# Patient Record
Sex: FEMALE | Race: BLACK OR AFRICAN AMERICAN | NOT HISPANIC OR LATINO | Employment: FULL TIME | RURAL
[De-identification: names, ages, dates, MRNs, and addresses within clinical notes are randomized per-mention and may not be internally consistent; named-entity substitution may affect disease eponyms.]

---

## 2020-03-16 LAB
PAP RECOMMENDATION EXT: NORMAL
PAP RECOMMENDATION EXT: NORMAL

## 2021-09-10 ENCOUNTER — LAB VISIT (OUTPATIENT)
Dept: PRIMARY CARE CLINIC | Facility: CLINIC | Age: 41
End: 2021-09-10
Payer: COMMERCIAL

## 2021-09-10 DIAGNOSIS — Z02.83 ENCOUNTER FOR EMPLOYMENT-RELATED DRUG TESTING: ICD-10-CM

## 2021-09-10 PROCEDURE — 99000 PR SPECIMEN HANDLING,DR OFF->LAB: ICD-10-PCS | Mod: ,,, | Performed by: NURSE PRACTITIONER

## 2021-09-10 PROCEDURE — 99000 SPECIMEN HANDLING OFFICE-LAB: CPT | Mod: ,,, | Performed by: NURSE PRACTITIONER

## 2021-09-27 LAB
BCS RECOMMENDATION EXT: NORMAL

## 2022-01-05 ENCOUNTER — OFFICE VISIT (OUTPATIENT)
Dept: FAMILY MEDICINE | Facility: CLINIC | Age: 42
End: 2022-01-05
Payer: COMMERCIAL

## 2022-01-05 VITALS
TEMPERATURE: 98 F | OXYGEN SATURATION: 99 % | WEIGHT: 250 LBS | DIASTOLIC BLOOD PRESSURE: 94 MMHG | BODY MASS INDEX: 40.18 KG/M2 | HEART RATE: 98 BPM | HEIGHT: 66 IN | SYSTOLIC BLOOD PRESSURE: 135 MMHG

## 2022-01-05 DIAGNOSIS — Z11.52 ENCOUNTER FOR SCREENING LABORATORY TESTING FOR COVID-19 VIRUS: ICD-10-CM

## 2022-01-05 DIAGNOSIS — J40 BRONCHITIS: Primary | ICD-10-CM

## 2022-01-05 LAB
CTP QC/QA: YES
SARS-COV-2 AG RESP QL IA.RAPID: NEGATIVE

## 2022-01-05 PROCEDURE — 1159F MED LIST DOCD IN RCRD: CPT | Mod: ,,, | Performed by: FAMILY MEDICINE

## 2022-01-05 PROCEDURE — 1160F RVW MEDS BY RX/DR IN RCRD: CPT | Mod: ,,, | Performed by: FAMILY MEDICINE

## 2022-01-05 PROCEDURE — 3008F BODY MASS INDEX DOCD: CPT | Mod: ,,, | Performed by: FAMILY MEDICINE

## 2022-01-05 PROCEDURE — 87426 SARS CORONAVIRUS 2 ANTIGEN POCT: ICD-10-PCS | Mod: QW,,, | Performed by: FAMILY MEDICINE

## 2022-01-05 PROCEDURE — 87426 SARSCOV CORONAVIRUS AG IA: CPT | Mod: QW,,, | Performed by: FAMILY MEDICINE

## 2022-01-05 PROCEDURE — 99203 PR OFFICE/OUTPT VISIT, NEW, LEVL III, 30-44 MIN: ICD-10-PCS | Mod: ,,, | Performed by: FAMILY MEDICINE

## 2022-01-05 PROCEDURE — 99203 OFFICE O/P NEW LOW 30 MIN: CPT | Mod: ,,, | Performed by: FAMILY MEDICINE

## 2022-01-05 PROCEDURE — 3080F DIAST BP >= 90 MM HG: CPT | Mod: ,,, | Performed by: FAMILY MEDICINE

## 2022-01-05 PROCEDURE — 3080F PR MOST RECENT DIASTOLIC BLOOD PRESSURE >= 90 MM HG: ICD-10-PCS | Mod: ,,, | Performed by: FAMILY MEDICINE

## 2022-01-05 PROCEDURE — 3075F PR MOST RECENT SYSTOLIC BLOOD PRESS GE 130-139MM HG: ICD-10-PCS | Mod: ,,, | Performed by: FAMILY MEDICINE

## 2022-01-05 PROCEDURE — 3008F PR BODY MASS INDEX (BMI) DOCUMENTED: ICD-10-PCS | Mod: ,,, | Performed by: FAMILY MEDICINE

## 2022-01-05 PROCEDURE — 3075F SYST BP GE 130 - 139MM HG: CPT | Mod: ,,, | Performed by: FAMILY MEDICINE

## 2022-01-05 PROCEDURE — 1160F PR REVIEW ALL MEDS BY PRESCRIBER/CLIN PHARMACIST DOCUMENTED: ICD-10-PCS | Mod: ,,, | Performed by: FAMILY MEDICINE

## 2022-01-05 PROCEDURE — 1159F PR MEDICATION LIST DOCUMENTED IN MEDICAL RECORD: ICD-10-PCS | Mod: ,,, | Performed by: FAMILY MEDICINE

## 2022-01-05 RX ORDER — ALBUTEROL SULFATE 90 UG/1
2 AEROSOL, METERED RESPIRATORY (INHALATION) EVERY 6 HOURS PRN
Qty: 18 G | Refills: 0 | Status: SHIPPED | OUTPATIENT
Start: 2022-01-05 | End: 2022-04-08

## 2022-01-05 RX ORDER — AZITHROMYCIN 250 MG/1
TABLET, FILM COATED ORAL
Qty: 6 TABLET | Refills: 0 | Status: SHIPPED | OUTPATIENT
Start: 2022-01-05 | End: 2022-04-08

## 2022-01-05 RX ORDER — BENZONATATE 100 MG/1
100 CAPSULE ORAL 3 TIMES DAILY PRN
Qty: 20 CAPSULE | Refills: 0 | Status: SHIPPED | OUTPATIENT
Start: 2022-01-05 | End: 2022-04-08

## 2022-01-05 RX ORDER — PREDNISONE 20 MG/1
20 TABLET ORAL DAILY
Qty: 5 TABLET | Refills: 0 | Status: SHIPPED | OUTPATIENT
Start: 2022-01-05 | End: 2022-01-10

## 2022-01-05 NOTE — PROGRESS NOTES
Subjective:       Patient ID: Humera Guerrero is a 41 y.o. female.    Chief Complaint: COVID-19 Concerns (Congestion, cough,green mucus sore throat)    HPI  Review of Systems   Constitutional: Negative for activity change, appetite change, chills, diaphoresis, fatigue, fever and unexpected weight change.   HENT: Positive for nasal congestion, postnasal drip, rhinorrhea, sinus pressure/congestion and sore throat. Negative for dental problem, drooling, ear discharge, ear pain, facial swelling, hearing loss, mouth sores, nosebleeds, sneezing, tinnitus, trouble swallowing, voice change and goiter.    Eyes: Negative for photophobia, discharge, itching and visual disturbance.   Respiratory: Positive for cough. Negative for apnea, choking, chest tightness, shortness of breath, wheezing and stridor.    Cardiovascular: Negative for chest pain, palpitations, leg swelling and claudication.   Gastrointestinal: Negative for abdominal distention, abdominal pain, anal bleeding, blood in stool, change in bowel habit, constipation, diarrhea, nausea, vomiting and change in bowel habit.   Endocrine: Negative for cold intolerance, heat intolerance, polydipsia, polyphagia and polyuria.   Genitourinary: Negative for bladder incontinence, decreased urine volume, difficulty urinating, dysuria, enuresis, flank pain, frequency, hematuria, nocturia, pelvic pain and urgency.   Musculoskeletal: Negative for arthralgias, back pain, gait problem, joint swelling, leg pain, myalgias, neck pain, neck stiffness and joint deformity.   Integumentary:  Negative for pallor, rash, wound, breast mass and breast tenderness.   Allergic/Immunologic: Negative for environmental allergies, food allergies and immunocompromised state.   Neurological: Negative for dizziness, vertigo, tremors, seizures, syncope, facial asymmetry, speech difficulty, weakness, light-headedness, numbness, headaches, disturbances in coordination, memory loss and coordination  difficulties.   Hematological: Negative for adenopathy. Does not bruise/bleed easily.   Psychiatric/Behavioral: Negative for agitation, behavioral problems, confusion, decreased concentration, dysphoric mood, hallucinations, self-injury, sleep disturbance and suicidal ideas. The patient is not nervous/anxious and is not hyperactive.    Breast: Negative for mass and tenderness        Objective:      Physical Exam  Vitals reviewed.   Constitutional:       Appearance: Normal appearance.   HENT:      Head: Normocephalic and atraumatic.      Right Ear: Tympanic membrane, ear canal and external ear normal.      Left Ear: Tympanic membrane, ear canal and external ear normal.      Nose: Congestion and rhinorrhea present.      Mouth/Throat:      Mouth: Mucous membranes are moist.      Pharynx: Oropharynx is clear. Posterior oropharyngeal erythema present.   Eyes:      Extraocular Movements: Extraocular movements intact.      Conjunctiva/sclera: Conjunctivae normal.      Pupils: Pupils are equal, round, and reactive to light.   Cardiovascular:      Rate and Rhythm: Normal rate and regular rhythm.      Pulses: Normal pulses.      Heart sounds: Normal heart sounds.   Pulmonary:      Effort: Pulmonary effort is normal.      Breath sounds: Wheezing and rhonchi present.   Abdominal:      General: Bowel sounds are normal.      Palpations: Abdomen is soft.   Musculoskeletal:         General: Normal range of motion.      Cervical back: Normal range of motion and neck supple.   Skin:     General: Skin is warm and dry.   Neurological:      General: No focal deficit present.      Mental Status: She is alert. Mental status is at baseline.   Psychiatric:         Mood and Affect: Mood normal.         Behavior: Behavior normal.         Thought Content: Thought content normal.         Judgment: Judgment normal.         Assessment:       1. Bronchitis    2. Encounter for screening laboratory testing for COVID-19 virus        Plan:      Bronchitis    Encounter for screening laboratory testing for COVID-19 virus  -     SARS Coronavirus 2 Antigen, POCT    Other orders  -     azithromycin (Z-MASSIMO) 250 MG tablet; Take 2 tablets by mouth on day 1; Take 1 tablet by mouth on days 2-5  Dispense: 6 tablet; Refill: 0  -     predniSONE (DELTASONE) 20 MG tablet; Take 1 tablet (20 mg total) by mouth once daily. for 5 days  Dispense: 5 tablet; Refill: 0  -     benzonatate (TESSALON) 100 MG capsule; Take 1 capsule (100 mg total) by mouth 3 (three) times daily as needed for Cough.  Dispense: 20 capsule; Refill: 0  -     albuterol (VENTOLIN HFA) 90 mcg/actuation inhaler; Inhale 2 puffs into the lungs every 6 (six) hours as needed for Wheezing. Rescue  Dispense: 18 g; Refill: 0         Covid negative.

## 2022-01-05 NOTE — LETTER
January 5, 2022      [facility]  [address] [facility] [place] [address]  Phone: [phone]  Fax: [phone]       Patient: Humera Guerrero   YOB: 1980  Date of Visit: 01/05/2022    To Whom It May Concern:    RG Guerrero  was at [facility] on 01/05/2022. The patient may return to work/school on 01/10/2022 with no restrictions. If you have any questions or concerns, or if I can be of further assistance, please do not hesitate to contact me.    Sincerely,    Dr. Brian Hurst II

## 2022-04-08 ENCOUNTER — OFFICE VISIT (OUTPATIENT)
Dept: FAMILY MEDICINE | Facility: CLINIC | Age: 42
End: 2022-04-08
Payer: COMMERCIAL

## 2022-04-08 VITALS
OXYGEN SATURATION: 98 % | SYSTOLIC BLOOD PRESSURE: 122 MMHG | HEIGHT: 66 IN | TEMPERATURE: 98 F | BODY MASS INDEX: 41.46 KG/M2 | DIASTOLIC BLOOD PRESSURE: 88 MMHG | RESPIRATION RATE: 18 BRPM | HEART RATE: 80 BPM | WEIGHT: 258 LBS

## 2022-04-08 DIAGNOSIS — Z00.01 ENCOUNTER FOR GENERAL ADULT MEDICAL EXAMINATION WITH ABNORMAL FINDINGS: Primary | ICD-10-CM

## 2022-04-08 DIAGNOSIS — R53.83 FATIGUE, UNSPECIFIED TYPE: ICD-10-CM

## 2022-04-08 DIAGNOSIS — K59.00 CONSTIPATION, UNSPECIFIED CONSTIPATION TYPE: ICD-10-CM

## 2022-04-08 DIAGNOSIS — Z13.220 SCREENING FOR LIPID DISORDERS: ICD-10-CM

## 2022-04-08 DIAGNOSIS — Z13.29 SCREENING FOR THYROID DISORDER: ICD-10-CM

## 2022-04-08 DIAGNOSIS — Z13.1 SCREENING FOR DIABETES MELLITUS: ICD-10-CM

## 2022-04-08 DIAGNOSIS — R41.89 BRAIN FOG: ICD-10-CM

## 2022-04-08 LAB
25(OH)D3 SERPL-MCNC: 29.8 NG/ML
ALBUMIN SERPL BCP-MCNC: 3.8 G/DL (ref 3.5–5)
ALBUMIN/GLOB SERPL: 0.9 {RATIO}
ALP SERPL-CCNC: 77 U/L (ref 37–98)
ALT SERPL W P-5'-P-CCNC: 27 U/L (ref 13–56)
ANION GAP SERPL CALCULATED.3IONS-SCNC: 10 MMOL/L (ref 7–16)
AST SERPL W P-5'-P-CCNC: 15 U/L (ref 15–37)
BASOPHILS # BLD AUTO: 0.04 K/UL (ref 0–0.2)
BASOPHILS NFR BLD AUTO: 0.5 % (ref 0–1)
BILIRUB SERPL-MCNC: 0.3 MG/DL (ref 0–1.2)
BUN SERPL-MCNC: 14 MG/DL (ref 7–18)
BUN/CREAT SERPL: 13 (ref 6–20)
CALCIUM SERPL-MCNC: 9.9 MG/DL (ref 8.5–10.1)
CHLORIDE SERPL-SCNC: 103 MMOL/L (ref 98–107)
CHOLEST SERPL-MCNC: 221 MG/DL (ref 0–200)
CHOLEST/HDLC SERPL: 3.8 {RATIO}
CO2 SERPL-SCNC: 29 MMOL/L (ref 21–32)
CREAT SERPL-MCNC: 1.12 MG/DL (ref 0.55–1.02)
DIFFERENTIAL METHOD BLD: ABNORMAL
EOSINOPHIL # BLD AUTO: 0.07 K/UL (ref 0–0.5)
EOSINOPHIL NFR BLD AUTO: 0.8 % (ref 1–4)
ERYTHROCYTE [DISTWIDTH] IN BLOOD BY AUTOMATED COUNT: 14.9 % (ref 11.5–14.5)
FOLATE SERPL-MCNC: >20 NG/ML (ref 3.1–17.5)
GLOBULIN SER-MCNC: 4.4 G/DL (ref 2–4)
GLUCOSE SERPL-MCNC: 67 MG/DL (ref 74–106)
HCT VFR BLD AUTO: 41.1 % (ref 38–47)
HCV AB SER QL: NORMAL
HDLC SERPL-MCNC: 58 MG/DL (ref 40–60)
HGB BLD-MCNC: 13 G/DL (ref 12–16)
HIV 1+O+2 AB SERPL QL: NORMAL
IMM GRANULOCYTES # BLD AUTO: 0.02 K/UL (ref 0–0.04)
IMM GRANULOCYTES NFR BLD: 0.2 % (ref 0–0.4)
LDLC SERPL CALC-MCNC: 119 MG/DL
LDLC/HDLC SERPL: 2.1 {RATIO}
LYMPHOCYTES # BLD AUTO: 3.01 K/UL (ref 1–4.8)
LYMPHOCYTES NFR BLD AUTO: 35.8 % (ref 27–41)
MCH RBC QN AUTO: 28 PG (ref 27–31)
MCHC RBC AUTO-ENTMCNC: 31.6 G/DL (ref 32–36)
MCV RBC AUTO: 88.4 FL (ref 80–96)
MONOCYTES # BLD AUTO: 0.37 K/UL (ref 0–0.8)
MONOCYTES NFR BLD AUTO: 4.4 % (ref 2–6)
MPC BLD CALC-MCNC: 11.6 FL (ref 9.4–12.4)
NEUTROPHILS # BLD AUTO: 4.89 K/UL (ref 1.8–7.7)
NEUTROPHILS NFR BLD AUTO: 58.3 % (ref 53–65)
NONHDLC SERPL-MCNC: 163 MG/DL
NRBC # BLD AUTO: 0 X10E3/UL
NRBC, AUTO (.00): 0 %
PLATELET # BLD AUTO: 323 K/UL (ref 150–400)
POTASSIUM SERPL-SCNC: 3.2 MMOL/L (ref 3.5–5.1)
PROT SERPL-MCNC: 8.2 G/DL (ref 6.4–8.2)
RBC # BLD AUTO: 4.65 M/UL (ref 4.2–5.4)
SODIUM SERPL-SCNC: 139 MMOL/L (ref 136–145)
T3FREE SERPL-MCNC: 2.43 PG/ML (ref 2.18–3.98)
T4 FREE SERPL-MCNC: 0.96 NG/DL (ref 0.76–1.46)
TRIGL SERPL-MCNC: 220 MG/DL (ref 35–150)
TSH SERPL DL<=0.005 MIU/L-ACNC: 1.42 UIU/ML (ref 0.36–3.74)
VIT B12 SERPL-MCNC: 424 PG/ML (ref 193–986)
VLDLC SERPL-MCNC: 44 MG/DL
WBC # BLD AUTO: 8.4 K/UL (ref 4.5–11)

## 2022-04-08 PROCEDURE — 80050 GENERAL HEALTH PANEL: CPT | Mod: ICN,,, | Performed by: CLINICAL MEDICAL LABORATORY

## 2022-04-08 PROCEDURE — 3079F DIAST BP 80-89 MM HG: CPT | Mod: ,,, | Performed by: NURSE PRACTITIONER

## 2022-04-08 PROCEDURE — 86803 HEPATITIS C ANTIBODY: ICD-10-PCS | Mod: ICN,,, | Performed by: CLINICAL MEDICAL LABORATORY

## 2022-04-08 PROCEDURE — 1159F MED LIST DOCD IN RCRD: CPT | Mod: ,,, | Performed by: NURSE PRACTITIONER

## 2022-04-08 PROCEDURE — 84481 FREE ASSAY (FT-3): CPT | Mod: ICN,,, | Performed by: CLINICAL MEDICAL LABORATORY

## 2022-04-08 PROCEDURE — 3008F PR BODY MASS INDEX (BMI) DOCUMENTED: ICD-10-PCS | Mod: ,,, | Performed by: NURSE PRACTITIONER

## 2022-04-08 PROCEDURE — 3074F PR MOST RECENT SYSTOLIC BLOOD PRESSURE < 130 MM HG: ICD-10-PCS | Mod: ,,, | Performed by: NURSE PRACTITIONER

## 2022-04-08 PROCEDURE — 84481 T3, FREE: ICD-10-PCS | Mod: ICN,,, | Performed by: CLINICAL MEDICAL LABORATORY

## 2022-04-08 PROCEDURE — 83036 HEMOGLOBIN GLYCOSYLATED A1C: CPT | Mod: ,,, | Performed by: CLINICAL MEDICAL LABORATORY

## 2022-04-08 PROCEDURE — 82607 VITAMIN B-12: CPT | Mod: ICN,,, | Performed by: CLINICAL MEDICAL LABORATORY

## 2022-04-08 PROCEDURE — 3074F SYST BP LT 130 MM HG: CPT | Mod: ,,, | Performed by: NURSE PRACTITIONER

## 2022-04-08 PROCEDURE — 82746 VITAMIN B12/FOLATE, SERUM PANEL: ICD-10-PCS | Mod: ICN,,, | Performed by: CLINICAL MEDICAL LABORATORY

## 2022-04-08 PROCEDURE — 86803 HEPATITIS C AB TEST: CPT | Mod: ICN,,, | Performed by: CLINICAL MEDICAL LABORATORY

## 2022-04-08 PROCEDURE — 84439 T4, FREE: ICD-10-PCS | Mod: ICN,,, | Performed by: CLINICAL MEDICAL LABORATORY

## 2022-04-08 PROCEDURE — 3008F BODY MASS INDEX DOCD: CPT | Mod: ,,, | Performed by: NURSE PRACTITIONER

## 2022-04-08 PROCEDURE — 82306 VITAMIN D: ICD-10-PCS | Mod: ICN,,, | Performed by: CLINICAL MEDICAL LABORATORY

## 2022-04-08 PROCEDURE — 80050 COMPREHENSIVE METABOLIC PANEL: ICD-10-PCS | Mod: ICN,,, | Performed by: CLINICAL MEDICAL LABORATORY

## 2022-04-08 PROCEDURE — 87389 HIV 1 / 2 ANTIBODY: ICD-10-PCS | Mod: ICN,,, | Performed by: CLINICAL MEDICAL LABORATORY

## 2022-04-08 PROCEDURE — 80061 LIPID PANEL: CPT | Mod: ICN,,, | Performed by: CLINICAL MEDICAL LABORATORY

## 2022-04-08 PROCEDURE — 83036 HEMOGLOBIN A1C: ICD-10-PCS | Mod: ,,, | Performed by: CLINICAL MEDICAL LABORATORY

## 2022-04-08 PROCEDURE — 87389 HIV-1 AG W/HIV-1&-2 AB AG IA: CPT | Mod: ICN,,, | Performed by: CLINICAL MEDICAL LABORATORY

## 2022-04-08 PROCEDURE — 82607 VITAMIN B12/FOLATE, SERUM PANEL: ICD-10-PCS | Mod: ICN,,, | Performed by: CLINICAL MEDICAL LABORATORY

## 2022-04-08 PROCEDURE — 99396 PR PREVENTIVE VISIT,EST,40-64: ICD-10-PCS | Mod: ,,, | Performed by: NURSE PRACTITIONER

## 2022-04-08 PROCEDURE — 84439 ASSAY OF FREE THYROXINE: CPT | Mod: ICN,,, | Performed by: CLINICAL MEDICAL LABORATORY

## 2022-04-08 PROCEDURE — 82306 VITAMIN D 25 HYDROXY: CPT | Mod: ICN,,, | Performed by: CLINICAL MEDICAL LABORATORY

## 2022-04-08 PROCEDURE — 99396 PREV VISIT EST AGE 40-64: CPT | Mod: ,,, | Performed by: NURSE PRACTITIONER

## 2022-04-08 PROCEDURE — 80061 LIPID PANEL: ICD-10-PCS | Mod: ICN,,, | Performed by: CLINICAL MEDICAL LABORATORY

## 2022-04-08 PROCEDURE — 1159F PR MEDICATION LIST DOCUMENTED IN MEDICAL RECORD: ICD-10-PCS | Mod: ,,, | Performed by: NURSE PRACTITIONER

## 2022-04-08 PROCEDURE — 82746 ASSAY OF FOLIC ACID SERUM: CPT | Mod: ICN,,, | Performed by: CLINICAL MEDICAL LABORATORY

## 2022-04-08 PROCEDURE — 3079F PR MOST RECENT DIASTOLIC BLOOD PRESSURE 80-89 MM HG: ICD-10-PCS | Mod: ,,, | Performed by: NURSE PRACTITIONER

## 2022-04-08 NOTE — PROGRESS NOTES
SHANNON Callaway   [facility]      PATIENT NAME: Humera Guerrero  : 1980  DATE: 22  MRN: [id number]      Patient PCP Information     Provider PCP Type    [facility]        Chief Complaint      Chief Complaint   Patient presents with    Annual Exam       History of Present Illness        Humera Guerrero is a 41 y.o. female who presents for routine wellness visit. Pt states she is doing well current concern include daytime fatigue, sob and brain fog. Constipation, full in throat area. Dysphagia. Frequent urination at night. Increased anxiety. Pt denies FHx of colon, or cervical cancer. Paternal aunt breast cancer, maternal grandmother cervical cancer. Last colonoscopy was never. Last mammogram was through [facility] in 2022 normal per patient will request records for mammogram and pap. Last pap was 3/16/2020. Had flu shot. Nonsmoker. Working on improving diet and exercise. No hx of covid. Received Pfizer x 2 doses.    Past Medical History:  History reviewed. No pertinent past medical history.    Past Surgical History:   has a past surgical history that includes Hysterectomy (Bilateral) and  section.    Social History:  Social History     Tobacco Use    Smoking status: Former Smoker     Types: Cigars    Smokeless tobacco: Never Used   Substance Use Topics    Alcohol use: Yes     Comment: 1 mixed drink ocassionally    Drug use: Never       I personally reviewed all past medical, surgical, and social.     Review of Systems   Constitutional: Positive for fatigue.   HENT: Negative.    Respiratory: Positive for choking and shortness of breath.    Cardiovascular: Positive for leg swelling.   Gastrointestinal: Positive for constipation.   Endocrine: Positive for heat intolerance and polyuria.        Nocturnal polyuria   Genitourinary: Negative.    Musculoskeletal: Positive for arthralgias.   Skin: Negative.    Allergic/Immunologic: Negative.   "  Neurological: Negative.         Brain fog     Hematological: Negative.    Psychiatric/Behavioral: The patient is nervous/anxious.         Medications:  OTC estrogen supplement    Allergies:  Review of patient's allergies indicates:  No Known Allergies    Health Maintenance:    There is no immunization history on file for this patient.   Health Maintenance   Topic Date Due    Hepatitis C Screening  Never done    Lipid Panel  Never done    TETANUS VACCINE  Never done    Mammogram  Never done        Physical Exam      Vital Signs  Temp: 98.4 °F (36.9 °C)  Pulse: 80  Resp: 18  SpO2: 98 %  BP: 122/88  BP Location: Right arm  Patient Position: Sitting  Height and Weight  Height: 5' 6" (167.6 cm)  Weight: 117 kg (258 lb)  BSA (Calculated - sq m): 2.33 sq meters  BMI (Calculated): 41.7  Weight in (lb) to have BMI = 25: 154.6]                                            Visual Acuity Screening    Right eye Left eye Both eyes   Without correction: 20/50 20/40 20/40   With correction:      Comments: Pt states she wears glasses, does not have glasses in office today.       Physical Exam  Vitals and nursing note reviewed.   Constitutional:       Appearance: Normal appearance. She is obese.   HENT:      Head: Normocephalic.      Right Ear: Tympanic membrane, ear canal and external ear normal.      Left Ear: Tympanic membrane, ear canal and external ear normal.      Nose: Nose normal.      Mouth/Throat:      Mouth: Mucous membranes are moist.   Eyes:      Extraocular Movements: Extraocular movements intact.      Conjunctiva/sclera: Conjunctivae normal.      Pupils: Pupils are equal, round, and reactive to light.      Comments: exopthalmus     Neck:      Thyroid: Thyromegaly and thyroid tenderness present.      Vascular: No JVD.      Comments: Right side of thyroid enlarged compared to left, tender to palpation   Cardiovascular:      Rate and Rhythm: Normal rate and regular rhythm.      Pulses: Normal pulses.      Heart " sounds: Normal heart sounds. No murmur heard.  Pulmonary:      Effort: Pulmonary effort is normal.      Breath sounds: Normal breath sounds. No stridor. No wheezing or rhonchi.   Abdominal:      General: There is no distension.      Palpations: Abdomen is soft. There is no mass.      Tenderness: There is no abdominal tenderness.      Comments: Hypoactive bs   Musculoskeletal:         General: No swelling or tenderness. Normal range of motion.      Cervical back: Full passive range of motion without pain, normal range of motion and neck supple.      Right lower leg: No edema.      Left lower leg: No edema.   Skin:     General: Skin is warm and dry.      Capillary Refill: Capillary refill takes less than 2 seconds.   Neurological:      General: No focal deficit present.      Mental Status: She is alert and oriented to person, place, and time. Mental status is at baseline.      Cranial Nerves: No cranial nerve deficit.      Sensory: No sensory deficit.      Motor: No weakness.   Psychiatric:         Mood and Affect: Mood normal.         Behavior: Behavior normal.         Thought Content: Thought content normal.         Judgment: Judgment normal.          Depression Screening  PHQ-9:    Over the last 2 weeks, how often have you been bothered by any of the following problems?    1.  Little interest or pleasure in doing things: Not at all                       = 0   2.  Feeling down, depressed or hopeless: Not at all                       = 0   3.  Trouble falling or staying asleep, or sleeping too much: Not at all                       = 0   4.  Feeling tired or having little energy: Nearly every day           = 3   5.  Poor appetite or overeating: Several days                = 1   6.  Feeling bad about yourself - or that you are a failure or have let yourself or your family down: Not at all                       = 0   7.  Trouble concentrating on things, such as reading the newspaper or watching television: Not at all                        = 0   8.  Moving or speaking so slowly that other people could have noticed.  Or the opposite - being fidgety or restless that you have been moving around a lot more than usual: Not at all                       = 0   9.  Thoughts that you would be better off dead, or of hurting yourself: Not at all                       = 0    PHQ-9 Total:  4     Total Score  0-4: None  5-9: Mild  10-14: Moderate  15-19: Moderately Severe  20-27: Severe    Laboratory: Fasting labs pending   CBC:      CMP:      LIPIDS:      TSH:      A1C:        Assessment/Plan     Humera Guerrero is a 41 y.o.female with:     1. Encounter for general adult medical examination with abnormal findings  - Comprehensive Metabolic Panel; Future  - CBC Auto Differential; Future  - HIV 1/2 Ag/Ab (4th Gen); Future  - Hepatitis C Antibody; Future  - Mammogram and pap UTD  -Imm: UTD    2. Fatigue, constipation and brain fog  - TSH; Future  - Vitamin D; Future  - Vitamin B12 & Folate; Future  - T3, Free; Future  - T4, Free; Future  -increase fiber in diet     3. Screening for lipid disorders  - Lipid Panel; Future      4. Screening for diabetes mellitus  - Hemoglobin A1C; Future      5. Screening for thyroid disorder  - TSH; Future  - T3, Free; Future  - T4, Free; Future      8. BMI 40.0-44.9, adult  -discussed exercise and weight loss  -consider sleep study if workup for fatigue negative        Total time spent face-to-face and non-face-to-face coordinating care for this encounter was: >30 min    Chronic conditions status updated as per HPI.  Other than changes above, cont current medications and maintain follow up with specialists.  Return to clinic 1 year next wellness, 1 mo for fatigue follow up.    [facility]

## 2022-04-09 LAB
EST. AVERAGE GLUCOSE BLD GHB EST-MCNC: 107 MG/DL
HBA1C MFR BLD HPLC: 5.8 % (ref 4.5–6.6)

## 2022-04-14 ENCOUNTER — TELEPHONE (OUTPATIENT)
Dept: FAMILY MEDICINE | Facility: CLINIC | Age: 42
End: 2022-04-14
Payer: COMMERCIAL

## 2022-04-14 RX ORDER — LOVASTATIN 10 MG/1
10 TABLET ORAL NIGHTLY
Qty: 90 TABLET | Refills: 3 | Status: SHIPPED | OUTPATIENT
Start: 2022-04-14 | End: 2022-09-01 | Stop reason: SINTOL

## 2022-04-18 ENCOUNTER — TELEPHONE (OUTPATIENT)
Dept: FAMILY MEDICINE | Facility: CLINIC | Age: 42
End: 2022-04-18
Payer: COMMERCIAL

## 2022-04-18 DIAGNOSIS — E01.0 THYROMEGALY: Primary | ICD-10-CM

## 2022-04-25 ENCOUNTER — HOSPITAL ENCOUNTER (OUTPATIENT)
Dept: RADIOLOGY | Facility: HOSPITAL | Age: 42
Discharge: HOME | End: 2022-04-25
Attending: NURSE PRACTITIONER
Payer: COMMERCIAL

## 2022-04-25 DIAGNOSIS — E01.0 THYROMEGALY: ICD-10-CM

## 2022-04-25 PROCEDURE — 76536 US SOFT TISSUE HEAD NECK THYROID: ICD-10-PCS | Mod: 26,,,

## 2022-04-25 PROCEDURE — 76536 US EXAM OF HEAD AND NECK: CPT | Mod: TC

## 2022-04-25 PROCEDURE — 76536 US EXAM OF HEAD AND NECK: CPT | Mod: 26,,,

## 2022-05-05 ENCOUNTER — PATIENT OUTREACH (OUTPATIENT)
Dept: PEDIATRICS | Facility: CLINIC | Age: 42
End: 2022-05-05
Payer: COMMERCIAL

## 2022-05-20 ENCOUNTER — OFFICE VISIT (OUTPATIENT)
Dept: FAMILY MEDICINE | Facility: CLINIC | Age: 42
End: 2022-05-20
Payer: COMMERCIAL

## 2022-05-20 VITALS
HEIGHT: 66 IN | BODY MASS INDEX: 39.51 KG/M2 | RESPIRATION RATE: 17 BRPM | DIASTOLIC BLOOD PRESSURE: 79 MMHG | TEMPERATURE: 99 F | SYSTOLIC BLOOD PRESSURE: 110 MMHG | WEIGHT: 245.81 LBS | HEART RATE: 72 BPM | OXYGEN SATURATION: 98 %

## 2022-05-20 DIAGNOSIS — R73.03 PREDIABETES: ICD-10-CM

## 2022-05-20 DIAGNOSIS — E55.9 VITAMIN D DEFICIENCY: ICD-10-CM

## 2022-05-20 DIAGNOSIS — E78.5 HYPERLIPIDEMIA, UNSPECIFIED HYPERLIPIDEMIA TYPE: Primary | ICD-10-CM

## 2022-05-20 PROCEDURE — 3044F HG A1C LEVEL LT 7.0%: CPT | Mod: ,,, | Performed by: NURSE PRACTITIONER

## 2022-05-20 PROCEDURE — 1160F RVW MEDS BY RX/DR IN RCRD: CPT | Mod: ,,, | Performed by: NURSE PRACTITIONER

## 2022-05-20 PROCEDURE — 3078F PR MOST RECENT DIASTOLIC BLOOD PRESSURE < 80 MM HG: ICD-10-PCS | Mod: ,,, | Performed by: NURSE PRACTITIONER

## 2022-05-20 PROCEDURE — 1160F PR REVIEW ALL MEDS BY PRESCRIBER/CLIN PHARMACIST DOCUMENTED: ICD-10-PCS | Mod: ,,, | Performed by: NURSE PRACTITIONER

## 2022-05-20 PROCEDURE — 3074F SYST BP LT 130 MM HG: CPT | Mod: ,,, | Performed by: NURSE PRACTITIONER

## 2022-05-20 PROCEDURE — 3008F BODY MASS INDEX DOCD: CPT | Mod: ,,, | Performed by: NURSE PRACTITIONER

## 2022-05-20 PROCEDURE — 3008F PR BODY MASS INDEX (BMI) DOCUMENTED: ICD-10-PCS | Mod: ,,, | Performed by: NURSE PRACTITIONER

## 2022-05-20 PROCEDURE — 3044F PR MOST RECENT HEMOGLOBIN A1C LEVEL <7.0%: ICD-10-PCS | Mod: ,,, | Performed by: NURSE PRACTITIONER

## 2022-05-20 PROCEDURE — 3078F DIAST BP <80 MM HG: CPT | Mod: ,,, | Performed by: NURSE PRACTITIONER

## 2022-05-20 PROCEDURE — 1159F PR MEDICATION LIST DOCUMENTED IN MEDICAL RECORD: ICD-10-PCS | Mod: ,,, | Performed by: NURSE PRACTITIONER

## 2022-05-20 PROCEDURE — 3074F PR MOST RECENT SYSTOLIC BLOOD PRESSURE < 130 MM HG: ICD-10-PCS | Mod: ,,, | Performed by: NURSE PRACTITIONER

## 2022-05-20 PROCEDURE — 99213 PR OFFICE/OUTPT VISIT, EST, LEVL III, 20-29 MIN: ICD-10-PCS | Mod: ,,, | Performed by: NURSE PRACTITIONER

## 2022-05-20 PROCEDURE — 99213 OFFICE O/P EST LOW 20 MIN: CPT | Mod: ,,, | Performed by: NURSE PRACTITIONER

## 2022-05-20 PROCEDURE — 1159F MED LIST DOCD IN RCRD: CPT | Mod: ,,, | Performed by: NURSE PRACTITIONER

## 2022-05-20 NOTE — PROGRESS NOTES
SHANNON Callaway   [facility]      PATIENT NAME: Humera Guerrero  : 1980  DATE: 22  MRN: [id number]      Patient PCP Information     Provider PCP Type    [facility]          Reason for Visit / Chief Complaint: Follow-up (Pt states she had a annual wellness check last month. /Room: 8)         History of Present Illness / Problem Focused Workflow     Humera Guerrero presents to the clinic with Follow-up (Pt states she had a annual wellness check last month. /Room: 8)     HPI   Ms Guerrero is here for follow up on fatigue, sob and brain fog. On prior wellness visit patient was noted to have hyperlipidemia, prediabetes and Vitamin D deficiency.   Patient was started on statin for hyperlipidemia. Thyroid studies and thyroid us were check and all unremarkable.   Patient implemented diet and exercise changes for prediabetes. Patient down 13 lbs since last visit. Reports feeling better symptoms improved.   Patient also taking vit D and calcium supplement.   On last CMP creatinine 1.12, K 3.2 (unknown baseline)  Will repeat in 3 mo, increase oral hydration prev recommended    Review of Systems     Review of Systems   Constitutional: Negative for activity change, appetite change, chills, diaphoresis, fatigue, fever and unexpected weight change.   HENT: Negative for congestion, ear pain, facial swelling, hearing loss, nosebleeds and sore throat.    Respiratory: Negative for apnea, cough, shortness of breath and wheezing.    Cardiovascular: Negative for chest pain, palpitations and leg swelling.   Gastrointestinal: Negative for abdominal distention, abdominal pain, blood in stool, constipation, diarrhea and nausea.   Endocrine: Negative for cold intolerance, heat intolerance, polydipsia, polyphagia and polyuria.   Genitourinary: Negative for decreased urine volume, difficulty urinating, dysuria, flank pain, frequency, hematuria and urgency.   Musculoskeletal: Negative for  "arthralgias, joint swelling and myalgias.   Skin: Negative for color change and rash.   Neurological: Negative for dizziness, tremors, seizures, syncope, facial asymmetry, speech difficulty, weakness, light-headedness, numbness and headaches.   Hematological: Negative for adenopathy. Does not bruise/bleed easily.   Psychiatric/Behavioral: Negative for behavioral problems and confusion.       Medical / Social / Family History   History reviewed. No pertinent past medical history.    Past Surgical History:   Procedure Laterality Date     SECTION      HYSTERECTOMY Bilateral     TUBAL LIGATION         Social History  Ms.  reports that she has quit smoking. Her smoking use included cigars. She has never used smokeless tobacco. She reports current alcohol use. She reports that she does not use drugs.    Family History  Ms.'s family history includes Cancer in her paternal grandmother; Hypertension in her maternal grandmother and mother; Other in her mother.    Medications and Allergies     Medications  Outpatient Medications Marked as Taking for the 22 encounter (Office Visit) with SHANNON Callaway   Medication Sig Dispense Refill    lovastatin (MEVACOR) 10 MG tablet Take 1 tablet (10 mg total) by mouth every evening. 90 tablet 3       Allergies  Review of patient's allergies indicates:  No Known Allergies    Physical Examination     Vitals:    22 0817   BP: 110/79   BP Location: Right arm   Patient Position: Sitting   BP Method: Medium (Automatic)   Pulse: 72   Resp: 17   Temp: 98.5 °F (36.9 °C)   SpO2: 98%   Weight: 111.5 kg (245 lb 12.8 oz)   Height: 5' 6" (1.676 m)       Physical Exam  Vitals and nursing note reviewed.   Constitutional:       Appearance: Normal appearance. She is obese.   HENT:      Head: Normocephalic.      Right Ear: External ear normal.      Left Ear: External ear normal.      Nose: Nose normal.      Mouth/Throat:      Mouth: Mucous membranes are moist.   Eyes:      " Extraocular Movements: Extraocular movements intact.   Cardiovascular:      Rate and Rhythm: Normal rate.      Pulses: Normal pulses.      Heart sounds: No murmur heard.  Pulmonary:      Effort: Pulmonary effort is normal.      Breath sounds: No stridor. No wheezing or rhonchi.   Musculoskeletal:         General: No swelling or tenderness. Normal range of motion.      Cervical back: Normal range of motion and neck supple.      Right lower leg: No edema.      Left lower leg: No edema.   Skin:     General: Skin is warm and dry.      Capillary Refill: Capillary refill takes less than 2 seconds.   Neurological:      General: No focal deficit present.      Mental Status: She is alert. Mental status is at baseline.      Cranial Nerves: No cranial nerve deficit.      Sensory: No sensory deficit.      Motor: No weakness.   Psychiatric:         Mood and Affect: Mood normal.         Behavior: Behavior normal.         Thought Content: Thought content normal.         Judgment: Judgment normal.           No visits with results within 14 Day(s) from this visit.   Latest known visit with results is:   Patient Outreach on 05/05/2022   Component Date Value Ref Range Status    PAP Recommendation External 03/16/2020 Pap in 2 years   Final             Assessment and Plan (including Health Maintenance)         Plan:   Hyperlipidemia, unspecified hyperlipidemia type    Prediabetes    Vitamin D deficiency    BMI 39.0-39.9,adult     Continue diet and exercise good progress  BMI down   Continue statin and Ca//Vit D supplememnt  RTC in 3 mo for follow up, repeat BMP  >20 min spent on intake, hx, discussion of plan  There are no Patient Instructions on file for this visit.       Health Maintenance Due   Topic Date Due    COVID-19 Vaccine (1) Never done    TETANUS VACCINE  Never done         There is no immunization history on file for this patient.     Problem List Items Addressed This Visit        Cardiac/Vascular    Hyperlipidemia -  Primary       Endocrine    BMI 39.0-39.9,adult    Prediabetes    Vitamin D deficiency          Health Maintenance Topics with due status: Not Due       Topic Last Completion Date    Mammogram 09/27/2021    Influenza Vaccine Not Due       Future Appointments   Date Time Provider Department Center   8/26/2022  8:00 AM [facility]   4/10/2023 10:00 AM SHANNON Callaway [facility]            Signature:  SHANNON Callaway  [facility]     Date of encounter: 5/20/22

## 2022-07-18 ENCOUNTER — OFFICE VISIT (OUTPATIENT)
Dept: FAMILY MEDICINE | Facility: CLINIC | Age: 42
End: 2022-07-18
Payer: COMMERCIAL

## 2022-07-18 VITALS
HEIGHT: 66 IN | TEMPERATURE: 98 F | SYSTOLIC BLOOD PRESSURE: 116 MMHG | BODY MASS INDEX: 38.89 KG/M2 | WEIGHT: 242 LBS | RESPIRATION RATE: 18 BRPM | DIASTOLIC BLOOD PRESSURE: 80 MMHG | HEART RATE: 76 BPM | OXYGEN SATURATION: 97 %

## 2022-07-18 DIAGNOSIS — R73.03 PREDIABETES: Primary | ICD-10-CM

## 2022-07-18 DIAGNOSIS — E55.9 VITAMIN D INSUFFICIENCY: ICD-10-CM

## 2022-07-18 DIAGNOSIS — E78.5 HYPERLIPIDEMIA, UNSPECIFIED HYPERLIPIDEMIA TYPE: ICD-10-CM

## 2022-07-18 PROBLEM — G93.0 CEREBRAL CYST: Status: ACTIVE | Noted: 2022-07-18

## 2022-07-18 LAB
ALBUMIN SERPL BCP-MCNC: 3.8 G/DL (ref 3.5–5)
ALBUMIN/GLOB SERPL: 0.9 {RATIO}
ALP SERPL-CCNC: 71 U/L (ref 37–98)
ALT SERPL W P-5'-P-CCNC: 23 U/L (ref 13–56)
ANION GAP SERPL CALCULATED.3IONS-SCNC: 7 MMOL/L (ref 7–16)
AST SERPL W P-5'-P-CCNC: 14 U/L (ref 15–37)
BILIRUB SERPL-MCNC: 0.5 MG/DL (ref 0–1.2)
BUN SERPL-MCNC: 12 MG/DL (ref 7–18)
BUN/CREAT SERPL: 10 (ref 6–20)
CALCIUM SERPL-MCNC: 9.8 MG/DL (ref 8.5–10.1)
CHLORIDE SERPL-SCNC: 105 MMOL/L (ref 98–107)
CHOLEST SERPL-MCNC: 169 MG/DL (ref 0–200)
CHOLEST/HDLC SERPL: 3.1 {RATIO}
CO2 SERPL-SCNC: 33 MMOL/L (ref 21–32)
CREAT SERPL-MCNC: 1.21 MG/DL (ref 0.55–1.02)
EST. AVERAGE GLUCOSE BLD GHB EST-MCNC: 100 MG/DL
GLOBULIN SER-MCNC: 4.1 G/DL (ref 2–4)
GLUCOSE SERPL-MCNC: 95 MG/DL (ref 74–106)
HBA1C MFR BLD HPLC: 5.6 % (ref 4.5–6.6)
HDLC SERPL-MCNC: 55 MG/DL (ref 40–60)
LDLC SERPL CALC-MCNC: 65 MG/DL
LDLC/HDLC SERPL: 1.2 {RATIO}
NONHDLC SERPL-MCNC: 114 MG/DL
POTASSIUM SERPL-SCNC: 4.3 MMOL/L (ref 3.5–5.1)
PROT SERPL-MCNC: 7.9 G/DL (ref 6.4–8.2)
SODIUM SERPL-SCNC: 141 MMOL/L (ref 136–145)
TRIGL SERPL-MCNC: 243 MG/DL (ref 35–150)
VLDLC SERPL-MCNC: 49 MG/DL

## 2022-07-18 PROCEDURE — 80053 COMPREHENSIVE METABOLIC PANEL: ICD-10-PCS | Mod: ,,, | Performed by: CLINICAL MEDICAL LABORATORY

## 2022-07-18 PROCEDURE — 3079F DIAST BP 80-89 MM HG: CPT | Mod: ,,, | Performed by: NURSE PRACTITIONER

## 2022-07-18 PROCEDURE — 83036 HEMOGLOBIN GLYCOSYLATED A1C: CPT | Mod: ,,, | Performed by: CLINICAL MEDICAL LABORATORY

## 2022-07-18 PROCEDURE — 3008F BODY MASS INDEX DOCD: CPT | Mod: ,,, | Performed by: NURSE PRACTITIONER

## 2022-07-18 PROCEDURE — 3044F HG A1C LEVEL LT 7.0%: CPT | Mod: ,,, | Performed by: NURSE PRACTITIONER

## 2022-07-18 PROCEDURE — 83036 HEMOGLOBIN A1C: ICD-10-PCS | Mod: ,,, | Performed by: CLINICAL MEDICAL LABORATORY

## 2022-07-18 PROCEDURE — 99213 PR OFFICE/OUTPT VISIT, EST, LEVL III, 20-29 MIN: ICD-10-PCS | Mod: ,,, | Performed by: NURSE PRACTITIONER

## 2022-07-18 PROCEDURE — 80061 LIPID PANEL: CPT | Mod: ,,, | Performed by: CLINICAL MEDICAL LABORATORY

## 2022-07-18 PROCEDURE — 3074F PR MOST RECENT SYSTOLIC BLOOD PRESSURE < 130 MM HG: ICD-10-PCS | Mod: ,,, | Performed by: NURSE PRACTITIONER

## 2022-07-18 PROCEDURE — 3074F SYST BP LT 130 MM HG: CPT | Mod: ,,, | Performed by: NURSE PRACTITIONER

## 2022-07-18 PROCEDURE — 3079F PR MOST RECENT DIASTOLIC BLOOD PRESSURE 80-89 MM HG: ICD-10-PCS | Mod: ,,, | Performed by: NURSE PRACTITIONER

## 2022-07-18 PROCEDURE — 99213 OFFICE O/P EST LOW 20 MIN: CPT | Mod: ,,, | Performed by: NURSE PRACTITIONER

## 2022-07-18 PROCEDURE — 80053 COMPREHEN METABOLIC PANEL: CPT | Mod: ,,, | Performed by: CLINICAL MEDICAL LABORATORY

## 2022-07-18 PROCEDURE — 3008F PR BODY MASS INDEX (BMI) DOCUMENTED: ICD-10-PCS | Mod: ,,, | Performed by: NURSE PRACTITIONER

## 2022-07-18 PROCEDURE — 3044F PR MOST RECENT HEMOGLOBIN A1C LEVEL <7.0%: ICD-10-PCS | Mod: ,,, | Performed by: NURSE PRACTITIONER

## 2022-07-18 PROCEDURE — 1159F PR MEDICATION LIST DOCUMENTED IN MEDICAL RECORD: ICD-10-PCS | Mod: ,,, | Performed by: NURSE PRACTITIONER

## 2022-07-18 PROCEDURE — 80061 LIPID PANEL: ICD-10-PCS | Mod: ,,, | Performed by: CLINICAL MEDICAL LABORATORY

## 2022-07-18 PROCEDURE — 1159F MED LIST DOCD IN RCRD: CPT | Mod: ,,, | Performed by: NURSE PRACTITIONER

## 2022-07-18 NOTE — LETTER
July 18, 2022      [address] [address]  Phone: [phone]  Fax: [phone]       Patient: Humera Guerrero   YOB: 1980  Date of Visit: 07/18/2022    To Whom It May Concern:    RG Guerrero  was at [facility] on 07/18/2022. The patient may return to work/school on 7/18/2022 with no restrictions. If you have any questions or concerns, or if I can be of further assistance, please do not hesitate to contact me.    Sincerely,    SHANNON Callaway

## 2022-07-18 NOTE — PROGRESS NOTES
"SHANNON Callaway   [facility]      PATIENT NAME: Humera Guerrero  : 1980  DATE: 22  MRN: [id number]      Patient PCP Information     Provider PCP Type    SHANNON [facility]          Reason for Visit / Chief Complaint: Follow-up (Medication follow up /Room 8)         History of Present Illness / Problem Focused Workflow     Humera Guerrero presents to the clinic with Follow-up (Medication follow up /Room 8)     HPI   Ms Guerrero is here today for follow up on prediabetes and hyperlipidemia.   She is down another 2 lbs, continuing to work on diet and weight loss  Ms Guerrero does report some increased fatigue since last appt however overall doing well  Takes statin nightly as ordered   Ms Guerrero does report hx of "brain cyst" followed by Neurology here at [facility] in past. States she did have imaging for monitoring however has not been seen in recent years. Will obtain records from [facility]. Symptom at diagnosis blurred vision per patient. Wears glasses daily. No worsening vision reported.     Review of Systems     Review of Systems   Constitutional: Positive for fatigue. Negative for activity change, appetite change, chills, diaphoresis, fever and unexpected weight change.   HENT: Negative for congestion, ear pain, facial swelling, hearing loss, nosebleeds and sore throat.    Respiratory: Negative for apnea, cough, shortness of breath and wheezing.    Cardiovascular: Negative for chest pain, palpitations and leg swelling.   Gastrointestinal: Negative for abdominal distention, abdominal pain, blood in stool, constipation, diarrhea and nausea.   Endocrine: Negative for cold intolerance, heat intolerance, polydipsia, polyphagia and polyuria.   Genitourinary: Negative for decreased urine volume, difficulty urinating, dysuria, flank pain, frequency, hematuria and urgency.   Musculoskeletal: Negative for arthralgias, joint swelling and myalgias.   Skin: Negative for color change and rash. " "  Neurological: Negative for dizziness, tremors, seizures, syncope, facial asymmetry, speech difficulty, weakness, light-headedness, numbness and headaches.   Hematological: Negative for adenopathy. Does not bruise/bleed easily.   Psychiatric/Behavioral: Negative for behavioral problems and confusion.       Medical / Social / Family History     Past Medical History:   Diagnosis Date    Other hyperlipidemia     Prediabetes     Vitamin D insufficiency        Past Surgical History:   Procedure Laterality Date     SECTION      HYSTERECTOMY Bilateral     TUBAL LIGATION         Social History  Ms.  reports that she has quit smoking. Her smoking use included cigars. She has never used smokeless tobacco. She reports current alcohol use. She reports that she does not use drugs.    Family History  Ms.'s family history includes Cancer in her paternal grandmother; Hypertension in her maternal grandmother and mother; Other in her mother.    Medications and Allergies     Medications  Outpatient Medications Marked as Taking for the 22 encounter (Office Visit) with SHANNON Callaway   Medication Sig Dispense Refill    lovastatin (MEVACOR) 10 MG tablet Take 1 tablet (10 mg total) by mouth every evening. 90 tablet 3       Allergies  Review of patient's allergies indicates:  No Known Allergies    Physical Examination     Vitals:    22 0823   BP: 116/80   Pulse: 76   Resp: 18   Temp: 98.2 °F (36.8 °C)   SpO2: 97%   Weight: 109.8 kg (242 lb)   Height: 5' 6" (1.676 m)       Physical Exam  Vitals and nursing note reviewed.   Constitutional:       Appearance: Normal appearance. She is normal weight.   HENT:      Head: Normocephalic.      Right Ear: External ear normal.      Left Ear: External ear normal.      Nose: Nose normal.      Mouth/Throat:      Mouth: Mucous membranes are moist.   Eyes:      Extraocular Movements: Extraocular movements intact.      Conjunctiva/sclera: Conjunctivae normal.      Pupils: " Pupils are equal, round, and reactive to light.   Cardiovascular:      Rate and Rhythm: Normal rate and regular rhythm.      Pulses: Normal pulses.      Heart sounds: Normal heart sounds. No murmur heard.  Pulmonary:      Effort: Pulmonary effort is normal.      Breath sounds: Normal breath sounds. No stridor. No wheezing or rhonchi.   Abdominal:      General: Bowel sounds are normal. There is no distension.      Palpations: Abdomen is soft. There is no mass.      Tenderness: There is no abdominal tenderness.   Musculoskeletal:         General: No swelling or tenderness. Normal range of motion.      Cervical back: Normal range of motion and neck supple.      Right lower leg: No edema.      Left lower leg: No edema.   Skin:     General: Skin is warm and dry.      Capillary Refill: Capillary refill takes less than 2 seconds.   Neurological:      General: No focal deficit present.      Mental Status: She is alert. Mental status is at baseline.      Cranial Nerves: No cranial nerve deficit.      Sensory: No sensory deficit.      Motor: No weakness.   Psychiatric:         Mood and Affect: Mood normal.         Behavior: Behavior normal.         Thought Content: Thought content normal.         Judgment: Judgment normal.           No visits with results within 14 Day(s) from this visit.   Latest known visit with results is:   Patient Outreach on 05/05/2022   Component Date Value Ref Range Status    PAP Recommendation External 03/16/2020 Pap in 2 years   Final             Assessment and Plan (including Health Maintenance)       Plan:   Prediabetes  -     Hemoglobin A1C; Future; Expected date: 07/18/2022  -     Comprehensive Metabolic Panel; Future; Expected date: 07/18/2022  -    Continue ada diet and exercise     Hyperlipidemia, unspecified hyperlipidemia type  -     Lipid Panel; Future; Expected date: 07/18/2022  -     Continue statin    Vitamin D insufficiency       -    Continue otc vit D and calcium supplement      Follow up in 3 months  There are no Patient Instructions on file for this visit.       Health Maintenance Due   Topic Date Due    COVID-19 Vaccine (1) Never done    TETANUS VACCINE  Never done         There is no immunization history on file for this patient.     Problem List Items Addressed This Visit        Cardiac/Vascular    Hyperlipidemia    Relevant Orders    Lipid Panel       Endocrine    Prediabetes - Primary    Relevant Orders    Hemoglobin A1C    Comprehensive Metabolic Panel      Other Visit Diagnoses     Vitamin D insufficiency              Health Maintenance Topics with due status: Not Due       Topic Last Completion Date    Mammogram 09/27/2021    Influenza Vaccine Not Due       Future Appointments   Date Time Provider Department Center   8/26/2022  8:00 AM Sultana P Neil, FNP [facility]   10/18/2022  8:45 AM Sultana P Neil, FNP [facility]   4/10/2023 10:00 AM Sultana P Neil, FNP [facility]            Signature:  [facility]     Date of encounter: 7/18/22

## 2022-08-17 ENCOUNTER — OFFICE VISIT (OUTPATIENT)
Dept: FAMILY MEDICINE | Facility: CLINIC | Age: 42
End: 2022-08-17
Payer: COMMERCIAL

## 2022-08-17 VITALS
TEMPERATURE: 99 F | WEIGHT: 242 LBS | DIASTOLIC BLOOD PRESSURE: 86 MMHG | HEIGHT: 66 IN | OXYGEN SATURATION: 99 % | RESPIRATION RATE: 18 BRPM | SYSTOLIC BLOOD PRESSURE: 114 MMHG | BODY MASS INDEX: 38.89 KG/M2 | HEART RATE: 71 BPM

## 2022-08-17 DIAGNOSIS — Z11.52 ENCOUNTER FOR SCREENING LABORATORY TESTING FOR COVID-19 VIRUS: Primary | ICD-10-CM

## 2022-08-17 LAB
CTP QC/QA: YES
SARS-COV-2 AG RESP QL IA.RAPID: NEGATIVE

## 2022-08-17 PROCEDURE — 3074F PR MOST RECENT SYSTOLIC BLOOD PRESSURE < 130 MM HG: ICD-10-PCS | Mod: ,,, | Performed by: FAMILY MEDICINE

## 2022-08-17 PROCEDURE — 99214 PR OFFICE/OUTPT VISIT, EST, LEVL IV, 30-39 MIN: ICD-10-PCS | Mod: ,,, | Performed by: FAMILY MEDICINE

## 2022-08-17 PROCEDURE — 3044F PR MOST RECENT HEMOGLOBIN A1C LEVEL <7.0%: ICD-10-PCS | Mod: ,,, | Performed by: FAMILY MEDICINE

## 2022-08-17 PROCEDURE — 1160F PR REVIEW ALL MEDS BY PRESCRIBER/CLIN PHARMACIST DOCUMENTED: ICD-10-PCS | Mod: ,,, | Performed by: FAMILY MEDICINE

## 2022-08-17 PROCEDURE — 3044F HG A1C LEVEL LT 7.0%: CPT | Mod: ,,, | Performed by: FAMILY MEDICINE

## 2022-08-17 PROCEDURE — 3008F BODY MASS INDEX DOCD: CPT | Mod: ,,, | Performed by: FAMILY MEDICINE

## 2022-08-17 PROCEDURE — 1160F RVW MEDS BY RX/DR IN RCRD: CPT | Mod: ,,, | Performed by: FAMILY MEDICINE

## 2022-08-17 PROCEDURE — 3079F DIAST BP 80-89 MM HG: CPT | Mod: ,,, | Performed by: FAMILY MEDICINE

## 2022-08-17 PROCEDURE — 3074F SYST BP LT 130 MM HG: CPT | Mod: ,,, | Performed by: FAMILY MEDICINE

## 2022-08-17 PROCEDURE — 1159F PR MEDICATION LIST DOCUMENTED IN MEDICAL RECORD: ICD-10-PCS | Mod: ,,, | Performed by: FAMILY MEDICINE

## 2022-08-17 PROCEDURE — 3008F PR BODY MASS INDEX (BMI) DOCUMENTED: ICD-10-PCS | Mod: ,,, | Performed by: FAMILY MEDICINE

## 2022-08-17 PROCEDURE — 99214 OFFICE O/P EST MOD 30 MIN: CPT | Mod: ,,, | Performed by: FAMILY MEDICINE

## 2022-08-17 PROCEDURE — 87426 SARSCOV CORONAVIRUS AG IA: CPT | Mod: QW,,, | Performed by: FAMILY MEDICINE

## 2022-08-17 PROCEDURE — 3079F PR MOST RECENT DIASTOLIC BLOOD PRESSURE 80-89 MM HG: ICD-10-PCS | Mod: ,,, | Performed by: FAMILY MEDICINE

## 2022-08-17 PROCEDURE — 1159F MED LIST DOCD IN RCRD: CPT | Mod: ,,, | Performed by: FAMILY MEDICINE

## 2022-08-17 PROCEDURE — 87426 SARS CORONAVIRUS 2 ANTIGEN POCT: ICD-10-PCS | Mod: QW,,, | Performed by: FAMILY MEDICINE

## 2022-08-17 NOTE — PROGRESS NOTES
Subjective:       Patient ID: Humera Guerrero is a 41 y.o. female.    Chief Complaint: COVID-19 Concerns (Retest/)    HPI  Review of Systems   Constitutional: Negative for activity change, appetite change, chills, diaphoresis, fatigue, fever and unexpected weight change.   HENT: Negative for nasal congestion, dental problem, drooling, ear discharge, ear pain, facial swelling, hearing loss, mouth sores, nosebleeds, postnasal drip, rhinorrhea, sinus pressure/congestion, sneezing, sore throat, tinnitus, trouble swallowing, voice change and goiter.    Eyes: Negative for photophobia, discharge, itching and visual disturbance.   Respiratory: Negative for apnea, cough, choking, chest tightness, shortness of breath, wheezing and stridor.    Cardiovascular: Negative for chest pain, palpitations, leg swelling and claudication.   Gastrointestinal: Negative for abdominal distention, abdominal pain, anal bleeding, blood in stool, change in bowel habit, constipation, diarrhea, nausea, vomiting and change in bowel habit.   Endocrine: Negative for cold intolerance, heat intolerance, polydipsia, polyphagia and polyuria.   Genitourinary: Negative for bladder incontinence, decreased urine volume, difficulty urinating, dysuria, enuresis, flank pain, frequency, hematuria, nocturia, pelvic pain and urgency.   Musculoskeletal: Negative for arthralgias, back pain, gait problem, joint swelling, leg pain, myalgias, neck pain, neck stiffness and joint deformity.   Integumentary:  Negative for pallor, rash, wound, breast mass and breast tenderness.   Allergic/Immunologic: Negative for environmental allergies, food allergies and immunocompromised state.   Neurological: Negative for dizziness, vertigo, tremors, seizures, syncope, facial asymmetry, speech difficulty, weakness, light-headedness, numbness, headaches, disturbances in coordination, memory loss and coordination difficulties.   Hematological: Negative for adenopathy. Does not  bruise/bleed easily.   Psychiatric/Behavioral: Negative for agitation, behavioral problems, confusion, decreased concentration, dysphoric mood, hallucinations, self-injury, sleep disturbance and suicidal ideas. The patient is not nervous/anxious and is not hyperactive.    Breast: Negative for mass and tenderness        Objective:      Physical Exam  Vitals reviewed.   Constitutional:       Appearance: Normal appearance.   HENT:      Head: Normocephalic and atraumatic.      Right Ear: Tympanic membrane, ear canal and external ear normal.      Left Ear: Tympanic membrane, ear canal and external ear normal.      Nose: Nose normal.      Mouth/Throat:      Mouth: Mucous membranes are moist.      Pharynx: Oropharynx is clear.   Eyes:      Extraocular Movements: Extraocular movements intact.      Conjunctiva/sclera: Conjunctivae normal.      Pupils: Pupils are equal, round, and reactive to light.   Cardiovascular:      Rate and Rhythm: Normal rate and regular rhythm.      Pulses: Normal pulses.      Heart sounds: Normal heart sounds.   Pulmonary:      Effort: Pulmonary effort is normal.      Breath sounds: Normal breath sounds.   Abdominal:      General: Bowel sounds are normal.      Palpations: Abdomen is soft.   Musculoskeletal:         General: Normal range of motion.      Cervical back: Normal range of motion and neck supple.   Skin:     General: Skin is warm and dry.   Neurological:      General: No focal deficit present.      Mental Status: She is alert. Mental status is at baseline.   Psychiatric:         Mood and Affect: Mood normal.         Behavior: Behavior normal.         Thought Content: Thought content normal.         Judgment: Judgment normal.         Assessment:       1. Encounter for screening laboratory testing for COVID-19 virus        Plan:     Encounter for screening laboratory testing for COVID-19 virus  -     SARS Coronavirus 2 Antigen, POCT       Covid negative.

## 2022-08-17 NOTE — LETTER
August 17, 2022      [facility]  [address] [facility] [address]  Phone: [phone]  Fax: [phone]       Patient: Humera Guererro   YOB: 1980  Date of Visit: 08/17/2022    To Whom It May Concern:    RG Guerrero  was at [facility] on 08/17/2022. The patient may return to work/school on 08/18/2022 with no restrictions. If you have any questions or concerns, or if I can be of further assistance, please do not hesitate to contact me.    Sincerely,    Dr. Brian Hurst II

## 2022-08-30 ENCOUNTER — TELEPHONE (OUTPATIENT)
Dept: FAMILY MEDICINE | Facility: CLINIC | Age: 42
End: 2022-08-30
Payer: COMMERCIAL

## 2022-09-01 ENCOUNTER — TELEPHONE (OUTPATIENT)
Dept: FAMILY MEDICINE | Facility: CLINIC | Age: 42
End: 2022-09-01
Payer: COMMERCIAL

## 2022-09-01 DIAGNOSIS — M10.379 ACUTE GOUT DUE TO RENAL IMPAIRMENT INVOLVING ANKLE, UNSPECIFIED LATERALITY: ICD-10-CM

## 2022-09-01 RX ORDER — ATORVASTATIN CALCIUM 10 MG/1
10 TABLET, FILM COATED ORAL NIGHTLY
Qty: 90 TABLET | Refills: 3 | Status: SHIPPED | OUTPATIENT
Start: 2022-09-01 | End: 2022-12-06 | Stop reason: SDUPTHER

## 2022-09-01 NOTE — TELEPHONE ENCOUNTER
Spoke with pt, states she had a recent ER visit to [facility], diagnosed with gout, stopped taking lovastatin. Advised pt will notify NP and give a call back. Pt verbalized understanding.

## 2022-10-18 ENCOUNTER — OFFICE VISIT (OUTPATIENT)
Dept: FAMILY MEDICINE | Facility: CLINIC | Age: 42
End: 2022-10-18
Payer: COMMERCIAL

## 2022-10-18 VITALS
WEIGHT: 242.5 LBS | DIASTOLIC BLOOD PRESSURE: 80 MMHG | HEIGHT: 66 IN | HEART RATE: 80 BPM | BODY MASS INDEX: 38.97 KG/M2 | SYSTOLIC BLOOD PRESSURE: 120 MMHG | OXYGEN SATURATION: 98 % | RESPIRATION RATE: 18 BRPM | TEMPERATURE: 98 F

## 2022-10-18 DIAGNOSIS — E78.1 HYPERTRIGLYCERIDEMIA: ICD-10-CM

## 2022-10-18 DIAGNOSIS — M10.9 GOUT, UNSPECIFIED CAUSE, UNSPECIFIED CHRONICITY, UNSPECIFIED SITE: ICD-10-CM

## 2022-10-18 DIAGNOSIS — Z87.448: Primary | ICD-10-CM

## 2022-10-18 DIAGNOSIS — Z12.39 ENCOUNTER FOR SCREENING FOR MALIGNANT NEOPLASM OF BREAST, UNSPECIFIED SCREENING MODALITY: ICD-10-CM

## 2022-10-18 DIAGNOSIS — G93.0 CEREBRAL CYST: ICD-10-CM

## 2022-10-18 LAB
ANION GAP SERPL CALCULATED.3IONS-SCNC: 8 MMOL/L (ref 7–16)
BILIRUB SERPL-MCNC: NEGATIVE MG/DL
BLOOD URINE, POC: ABNORMAL
BUN SERPL-MCNC: 10 MG/DL (ref 7–18)
BUN/CREAT SERPL: 10 (ref 6–20)
CALCIUM SERPL-MCNC: 9.2 MG/DL (ref 8.5–10.1)
CHLORIDE SERPL-SCNC: 103 MMOL/L (ref 98–107)
CO2 SERPL-SCNC: 31 MMOL/L (ref 21–32)
COLOR, POC UA: YELLOW
CREAT SERPL-MCNC: 1.04 MG/DL (ref 0.55–1.02)
EGFR (NO RACE VARIABLE) (RUSH/TITUS): 69 ML/MIN/1.73M²
GLUCOSE SERPL-MCNC: 78 MG/DL (ref 74–106)
GLUCOSE UR QL STRIP: NEGATIVE
KETONES UR QL STRIP: NEGATIVE
LEUKOCYTE ESTERASE URINE, POC: NEGATIVE
NITRITE, POC UA: NEGATIVE
PH, POC UA: 6
POTASSIUM SERPL-SCNC: 3.6 MMOL/L (ref 3.5–5.1)
PROTEIN, POC: NEGATIVE
SODIUM SERPL-SCNC: 138 MMOL/L (ref 136–145)
SPECIFIC GRAVITY, POC UA: 1.02
UROBILINOGEN, POC UA: 0.2

## 2022-10-18 PROCEDURE — 1160F PR REVIEW ALL MEDS BY PRESCRIBER/CLIN PHARMACIST DOCUMENTED: ICD-10-PCS | Mod: ,,, | Performed by: NURSE PRACTITIONER

## 2022-10-18 PROCEDURE — 1160F RVW MEDS BY RX/DR IN RCRD: CPT | Mod: ,,, | Performed by: NURSE PRACTITIONER

## 2022-10-18 PROCEDURE — 99213 OFFICE O/P EST LOW 20 MIN: CPT | Mod: ,,, | Performed by: NURSE PRACTITIONER

## 2022-10-18 PROCEDURE — 81003 URINALYSIS AUTO W/O SCOPE: CPT | Mod: QW,,, | Performed by: NURSE PRACTITIONER

## 2022-10-18 PROCEDURE — 3079F DIAST BP 80-89 MM HG: CPT | Mod: ,,, | Performed by: NURSE PRACTITIONER

## 2022-10-18 PROCEDURE — 99213 PR OFFICE/OUTPT VISIT, EST, LEVL III, 20-29 MIN: ICD-10-PCS | Mod: ,,, | Performed by: NURSE PRACTITIONER

## 2022-10-18 PROCEDURE — 3074F SYST BP LT 130 MM HG: CPT | Mod: ,,, | Performed by: NURSE PRACTITIONER

## 2022-10-18 PROCEDURE — 80048 BASIC METABOLIC PANEL: ICD-10-PCS | Mod: ,,, | Performed by: CLINICAL MEDICAL LABORATORY

## 2022-10-18 PROCEDURE — 3074F PR MOST RECENT SYSTOLIC BLOOD PRESSURE < 130 MM HG: ICD-10-PCS | Mod: ,,, | Performed by: NURSE PRACTITIONER

## 2022-10-18 PROCEDURE — 3079F PR MOST RECENT DIASTOLIC BLOOD PRESSURE 80-89 MM HG: ICD-10-PCS | Mod: ,,, | Performed by: NURSE PRACTITIONER

## 2022-10-18 PROCEDURE — 81003 POCT URINALYSIS W/O SCOPE: ICD-10-PCS | Mod: QW,,, | Performed by: NURSE PRACTITIONER

## 2022-10-18 PROCEDURE — 80048 BASIC METABOLIC PNL TOTAL CA: CPT | Mod: ,,, | Performed by: CLINICAL MEDICAL LABORATORY

## 2022-10-18 PROCEDURE — 1159F PR MEDICATION LIST DOCUMENTED IN MEDICAL RECORD: ICD-10-PCS | Mod: ,,, | Performed by: NURSE PRACTITIONER

## 2022-10-18 PROCEDURE — 3044F PR MOST RECENT HEMOGLOBIN A1C LEVEL <7.0%: ICD-10-PCS | Mod: ,,, | Performed by: NURSE PRACTITIONER

## 2022-10-18 PROCEDURE — 1159F MED LIST DOCD IN RCRD: CPT | Mod: ,,, | Performed by: NURSE PRACTITIONER

## 2022-10-18 PROCEDURE — 3044F HG A1C LEVEL LT 7.0%: CPT | Mod: ,,, | Performed by: NURSE PRACTITIONER

## 2022-10-18 RX ORDER — ESTRADIOL 1 MG/1
1 TABLET ORAL DAILY
COMMUNITY
Start: 2022-10-01

## 2022-10-18 RX ORDER — COLCHICINE 0.6 MG/1
TABLET ORAL
COMMUNITY
Start: 2022-08-16 | End: 2023-01-05 | Stop reason: SDUPTHER

## 2022-10-18 NOTE — PROGRESS NOTES
SHANNON Callaway   [facility]      PATIENT NAME: Humera Guerrero  : 1980  DATE: 10/18/22  MRN: [id number]      Patient PCP Information       Provider PCP Type    SHANNON [facility]            Reason for Visit / Chief Complaint: Follow-up (Room 5///)           History of Present Illness / Problem Focused Workflow     Humera Guerrero presents to the clinic with Follow-up (Room 5///)     HPI  Ms Guerrero is here today for follow up for hx of prediabetes (improved, A1C now normal), hypertriglyceridemia, elevated creatinine.  Patient reports hx of ED visit for gout since last appt. Patient taking cholchine prn since last appt. Patient reports total of 2 flares thus far.   Patient currently takes statin for hyperlipidemia. No diuretics. Discussed low purine diet.   Patients last creatinine 1.12 and 1.2. Follow up BMP and UA ordered today.    Review of Systems     Review of Systems   Constitutional:  Negative for activity change, appetite change, chills, diaphoresis, fatigue, fever and unexpected weight change.   HENT:  Negative for congestion, ear pain, facial swelling, hearing loss, nosebleeds and sore throat.    Eyes: Negative.    Respiratory:  Negative for apnea, cough, shortness of breath and wheezing.    Cardiovascular:  Negative for chest pain, palpitations and leg swelling.   Gastrointestinal:  Negative for abdominal distention, abdominal pain, blood in stool, constipation, diarrhea and nausea.   Endocrine: Negative for cold intolerance, heat intolerance, polydipsia, polyphagia and polyuria.   Genitourinary:  Negative for decreased urine volume, difficulty urinating, dysuria, flank pain, frequency, hematuria and urgency.   Musculoskeletal:  Negative for arthralgias, joint swelling and myalgias.   Skin:  Negative for color change and rash.   Allergic/Immunologic: Negative.    Neurological:  Negative for dizziness, tremors, seizures, syncope, facial asymmetry, speech difficulty,  "weakness, light-headedness, numbness and headaches.   Hematological:  Negative for adenopathy. Does not bruise/bleed easily.   Psychiatric/Behavioral:  Negative for behavioral problems and confusion.      Medical / Social / Family History     Past Medical History:   Diagnosis Date    Cerebral cyst     Other hyperlipidemia     Prediabetes     Vitamin D insufficiency        Past Surgical History:   Procedure Laterality Date     SECTION      HYSTERECTOMY Bilateral     TUBAL LIGATION         Social History  Ms.  reports that she has quit smoking. Her smoking use included cigars. She has never used smokeless tobacco. She reports current alcohol use. She reports that she does not use drugs.    Family History  Ms.'s family history includes Cancer in her paternal grandmother; Hypertension in her maternal grandmother and mother; Other in her mother.    Medications and Allergies     Medications  Outpatient Medications Marked as Taking for the 10/18/22 encounter (Office Visit) with SHANNON Callaway   Medication Sig Dispense Refill    atorvastatin (LIPITOR) 10 MG tablet Take 1 tablet (10 mg total) by mouth every evening. 90 tablet 3    colchicine (COLCRYS) 0.6 mg tablet Take by mouth.      estradioL (ESTRACE) 1 MG tablet Take 1 mg by mouth once daily.         Allergies  Review of patient's allergies indicates:  No Known Allergies    Physical Examination     Vitals:    10/18/22 0814 10/18/22 0904   BP: 132/77 120/80   BP Location: Left arm Right arm   Patient Position: Sitting Sitting   Pulse: 80    Resp: 18    Temp: 98.4 °F (36.9 °C)    SpO2: 98%    Weight: 110 kg (242 lb 8 oz)    Height: 5' 6" (1.676 m)        Physical Exam  Vitals and nursing note reviewed.   Constitutional:       Appearance: Normal appearance. She is normal weight.   HENT:      Head: Normocephalic.      Right Ear: External ear normal.      Left Ear: External ear normal.      Nose: Nose normal.      Mouth/Throat:      Mouth: Mucous membranes are " moist.   Eyes:      Extraocular Movements: Extraocular movements intact.      Conjunctiva/sclera: Conjunctivae normal.      Pupils: Pupils are equal, round, and reactive to light.   Cardiovascular:      Rate and Rhythm: Normal rate and regular rhythm.      Pulses: Normal pulses.      Heart sounds: Normal heart sounds. No murmur heard.  Pulmonary:      Effort: Pulmonary effort is normal.      Breath sounds: Normal breath sounds. No stridor. No wheezing or rhonchi.   Abdominal:      General: Bowel sounds are normal. There is no distension.      Palpations: Abdomen is soft. There is no mass.      Tenderness: There is no abdominal tenderness.   Musculoskeletal:         General: No swelling or tenderness. Normal range of motion.      Cervical back: Normal range of motion and neck supple.      Right lower leg: No edema.      Left lower leg: No edema.   Skin:     General: Skin is warm and dry.      Capillary Refill: Capillary refill takes less than 2 seconds.   Neurological:      General: No focal deficit present.      Mental Status: She is alert and oriented to person, place, and time. Mental status is at baseline.      Cranial Nerves: No cranial nerve deficit.      Sensory: No sensory deficit.      Motor: No weakness.   Psychiatric:         Mood and Affect: Mood normal.         Behavior: Behavior normal.         Thought Content: Thought content normal.         Judgment: Judgment normal.         Office Visit on 10/18/2022   Component Date Value Ref Range Status    Color, UA 10/18/2022 Yellow   Final    Spec Grav UA 10/18/2022 1.020   Final    pH, UA 10/18/2022 6.0   Final    WBC, UA 10/18/2022 negative   Final    Nitrite, UA 10/18/2022 negative   Final    Protein, POC 10/18/2022 negative   Final    Glucose, UA 10/18/2022 negative   Final    Ketones, UA 10/18/2022 negative   Final    Bilirubin, POC 10/18/2022 negative   Final    Urobilinogen, UA 10/18/2022 0.2   Final    Blood, UA 10/18/2022 large   Final              Assessment and Plan (including Health Maintenance)         Plan:   Hx of renal impairment  -     Basic Metabolic Panel; Future; Expected date: 10/18/2022  -     POCT URINALYSIS W/O SCOPE- large amt of blood reported on UA. No menses. Will follow up with BMP. Consider renal us if creatinine remains elevated.     Cerebral cyst  -     Ambulatory referral/consult to Neurology; Future; Expected date: 10/25/2022- reestablish care, pt prev followed by Dr Leija lost to follow up    Encounter for screening for malignant neoplasm of breast, unspecified screening modality  -     Mammo Digital Screening Bilat; Future; Expected date: 10/18/2022    Hypertriglyceridemia           -   continue statin    Gout, unspecified cause, unspecified chronicity, unspecified site            -    colchicine prn low purine diet    Follow up in 3 mo    There are no Patient Instructions on file for this visit.       Health Maintenance Due   Topic Date Due    COVID-19 Vaccine (1) Never done    TETANUS VACCINE  Never done    Influenza Vaccine (1) Never done    Mammogram  09/27/2022         There is no immunization history on file for this patient.     Problem List Items Addressed This Visit          Neuro    Cerebral cyst    Relevant Orders    Ambulatory referral/consult to Neurology       Orthopedic    Gout    Relevant Medications    colchicine (COLCRYS) 0.6 mg tablet     Other Visit Diagnoses       Hx of renal impairment    -  Primary    Relevant Orders    Basic Metabolic Panel    POCT URINALYSIS W/O SCOPE (Completed)    Encounter for screening for malignant neoplasm of breast, unspecified screening modality        Relevant Orders    Mammo Digital Screening Bilat    Hypertriglyceridemia                The patient has no Health Maintenance topics of status Not Due    Future Appointments   Date Time Provider Department Center   11/2/2022  1:45 PM [facility]   1/19/2023  8:00 AM SHANNON Callaway [facility] [place]   4/10/2023 10:00 AM SHANNON Callaway [facility]            Signature:  SHANNON Callaway  [facility]     Date of encounter: 10/18/22

## 2022-10-18 NOTE — LETTER
October 18, 2022      [facility] [address] [place] [address]  Phone: [phone]  Fax: [phone]       Patient: Humera Guerrero   YOB: 1980  Date of Visit: 10/18/2022    To Whom It May Concern:    RG Guerrero  was at [facility] on 10/18/2022. The patient may return to work/school on 10/18/2022 with no restrictions. If you have any questions or concerns, or if I can be of further assistance, please do not hesitate to contact me.    Sincerely,    SHANNON Callaway

## 2022-11-02 ENCOUNTER — HOSPITAL ENCOUNTER (OUTPATIENT)
Dept: RADIOLOGY | Facility: HOSPITAL | Age: 42
Discharge: HOME | End: 2022-11-02
Attending: NURSE PRACTITIONER
Payer: COMMERCIAL

## 2022-11-02 VITALS — WEIGHT: 242 LBS | BODY MASS INDEX: 38.89 KG/M2 | HEIGHT: 66 IN

## 2022-11-02 DIAGNOSIS — Z12.39 ENCOUNTER FOR SCREENING FOR MALIGNANT NEOPLASM OF BREAST, UNSPECIFIED SCREENING MODALITY: ICD-10-CM

## 2022-11-02 PROCEDURE — 77067 SCR MAMMO BI INCL CAD: CPT | Mod: TC

## 2022-11-03 ENCOUNTER — OFFICE VISIT (OUTPATIENT)
Dept: NEUROLOGY | Facility: CLINIC | Age: 42
End: 2022-11-03
Payer: COMMERCIAL

## 2022-11-03 VITALS
SYSTOLIC BLOOD PRESSURE: 118 MMHG | BODY MASS INDEX: 38.89 KG/M2 | RESPIRATION RATE: 18 BRPM | WEIGHT: 242 LBS | HEART RATE: 92 BPM | HEIGHT: 66 IN | OXYGEN SATURATION: 97 % | DIASTOLIC BLOOD PRESSURE: 72 MMHG

## 2022-11-03 DIAGNOSIS — H93.11 TINNITUS AURIUM, RIGHT: ICD-10-CM

## 2022-11-03 DIAGNOSIS — G44.1 OTHER VASCULAR HEADACHE: Primary | ICD-10-CM

## 2022-11-03 DIAGNOSIS — G93.0 CEREBRAL CYST: ICD-10-CM

## 2022-11-03 DIAGNOSIS — R42 DIZZINESS: ICD-10-CM

## 2022-11-03 PROBLEM — R51.9 CEPHALALGIA: Status: ACTIVE | Noted: 2022-11-03

## 2022-11-03 PROCEDURE — 1160F RVW MEDS BY RX/DR IN RCRD: CPT | Mod: ,,, | Performed by: NURSE PRACTITIONER

## 2022-11-03 PROCEDURE — 3044F HG A1C LEVEL LT 7.0%: CPT | Mod: ,,, | Performed by: NURSE PRACTITIONER

## 2022-11-03 PROCEDURE — 1159F PR MEDICATION LIST DOCUMENTED IN MEDICAL RECORD: ICD-10-PCS | Mod: ,,, | Performed by: NURSE PRACTITIONER

## 2022-11-03 PROCEDURE — 99215 OFFICE O/P EST HI 40 MIN: CPT | Mod: PBBFAC | Performed by: NURSE PRACTITIONER

## 2022-11-03 PROCEDURE — 3078F DIAST BP <80 MM HG: CPT | Mod: ,,, | Performed by: NURSE PRACTITIONER

## 2022-11-03 PROCEDURE — 3074F PR MOST RECENT SYSTOLIC BLOOD PRESSURE < 130 MM HG: ICD-10-PCS | Mod: ,,, | Performed by: NURSE PRACTITIONER

## 2022-11-03 PROCEDURE — 1160F PR REVIEW ALL MEDS BY PRESCRIBER/CLIN PHARMACIST DOCUMENTED: ICD-10-PCS | Mod: ,,, | Performed by: NURSE PRACTITIONER

## 2022-11-03 PROCEDURE — 3044F PR MOST RECENT HEMOGLOBIN A1C LEVEL <7.0%: ICD-10-PCS | Mod: ,,, | Performed by: NURSE PRACTITIONER

## 2022-11-03 PROCEDURE — 99203 OFFICE O/P NEW LOW 30 MIN: CPT | Mod: S$PBB,,, | Performed by: NURSE PRACTITIONER

## 2022-11-03 PROCEDURE — 3074F SYST BP LT 130 MM HG: CPT | Mod: ,,, | Performed by: NURSE PRACTITIONER

## 2022-11-03 PROCEDURE — 1159F MED LIST DOCD IN RCRD: CPT | Mod: ,,, | Performed by: NURSE PRACTITIONER

## 2022-11-03 PROCEDURE — 3008F BODY MASS INDEX DOCD: CPT | Mod: ,,, | Performed by: NURSE PRACTITIONER

## 2022-11-03 PROCEDURE — 3008F PR BODY MASS INDEX (BMI) DOCUMENTED: ICD-10-PCS | Mod: ,,, | Performed by: NURSE PRACTITIONER

## 2022-11-03 PROCEDURE — 99203 PR OFFICE/OUTPT VISIT, NEW, LEVL III, 30-44 MIN: ICD-10-PCS | Mod: S$PBB,,, | Performed by: NURSE PRACTITIONER

## 2022-11-03 PROCEDURE — 3078F PR MOST RECENT DIASTOLIC BLOOD PRESSURE < 80 MM HG: ICD-10-PCS | Mod: ,,, | Performed by: NURSE PRACTITIONER

## 2022-11-03 NOTE — PROGRESS NOTES
Subjective:       Patient ID: Humera Guerrero is a 42 y.o. female     Chief Complaint:    Chief Complaint   Patient presents with    Referral        Allergies:  Patient has no known allergies.    Current Medications:    Outpatient Encounter Medications as of 11/3/2022   Medication Sig Dispense Refill    atorvastatin (LIPITOR) 10 MG tablet Take 1 tablet (10 mg total) by mouth every evening. 90 tablet 3    estradioL (ESTRACE) 1 MG tablet Take 1 mg by mouth once daily.      colchicine (COLCRYS) 0.6 mg tablet Take by mouth.       No facility-administered encounter medications on file as of 11/3/2022.       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    Her one and only evaluation with Dr. Leija in neurology was in November of 2013, and that note is reviewed.  At that time she had a MRI brain done in May of 2013 that reported the cyst and thus was referred to Dr. Leija and she had repeat MRI brain w/wo done in November of 2013 per Dr. Leija which reported:    Again seen is a 2.3 x 2.7 CM cyst with a thin 2 moderate septation   versus 2 adjacent cysts in the right occipital lobe. This is felt to be   intra-axial. It follows CSF signal on all of the sequences on today's   study without significant change in size in the interval. No other acute   mass effect seen intracranially     She was not referred to neurosurgery per her report at that time.  No other new symptoms until more recently with reported tinnitus in the right ear, but also with infrequent headaches and dizziness which seemed to of began around 2 months ago.  She reports is able to treat headaches easily with tylenol/motrin.  Usually bilateral frontal in presentation.  No other neurological deficits noted.             Review of Systems  Review of Systems   Constitutional:  Negative for diaphoresis and fever.   HENT:  Positive for tinnitus. Negative for congestion and hearing loss.    Eyes:   Negative for blurred vision, double vision, photophobia, discharge and redness.   Respiratory:  Negative for cough and shortness of breath.    Cardiovascular:  Negative for chest pain.   Gastrointestinal:  Negative for abdominal pain, nausea and vomiting.   Musculoskeletal:  Negative for back pain, joint pain, myalgias and neck pain.   Skin:  Negative for itching and rash.   Neurological:  Positive for dizziness and headaches. Negative for tremors, sensory change, speech change, focal weakness, seizures, loss of consciousness and weakness.   Psychiatric/Behavioral:  Negative for depression, hallucinations and memory loss. The patient does not have insomnia.    All other systems reviewed and are negative.   Objective:     NEUROLOGICAL EXAMINATION:     MENTAL STATUS   Oriented to person, place, and time.   Registration: recalls 3 of 3 objects. Recall at 5 minutes: recalls 3 of 3 objects.   Attention: normal. Concentration: normal.   Speech: speech is normal   Level of consciousness: alert  Knowledge: good and consistent with education.   Normal comprehension.     CRANIAL NERVES     CN II   Visual fields full to confrontation.   Visual acuity: normal  Right visual field deficit: none  Left visual field deficit: none     CN III, IV, VI   Pupils are equal, round, and reactive to light.  Extraocular motions are normal.   Right pupil: Size: 3 mm. Shape: regular. Reactivity: brisk. Consensual response: intact. Accommodation: intact.   Left pupil: Size: 3 mm. Shape: regular. Reactivity: brisk. Consensual response: intact. Accommodation: intact.   CN III: no CN III palsy  CN VI: no CN VI palsy  Nystagmus: none   Diplopia: none  Upgaze: normal  Downgaze: normal  Conjugate gaze: present  Vestibulo-ocular reflex: present    CN V   Facial sensation intact.   Right facial sensation deficit: none  Left facial sensation deficit: none  Right corneal reflex: normal  Left corneal reflex: normal    CN VII   Facial expression full,  symmetric.   Right facial weakness: none  Left facial weakness: none  Right taste: normal  Left taste: normal    CN VIII   CN VIII normal.   Hearing: intact    CN IX, X   CN IX normal.   CN X normal.   Palate: symmetric    CN XI   CN XI normal.   Right sternocleidomastoid strength: normal  Left sternocleidomastoid strength: normal  Right trapezius strength: normal  Left trapezius strength: normal    CN XII   CN XII normal.   Tongue: not atrophic  Fasciculations: absent  Tongue deviation: none    MOTOR EXAM   Muscle bulk: normal  Overall muscle tone: normal  Right arm tone: normal  Left arm tone: normal  Right arm pronator drift: absent  Left arm pronator drift: absent  Right leg tone: normal  Left leg tone: normal    Strength   Right neck flexion: 5/5  Left neck flexion: 5/5  Right neck extension: 5/5  Left neck extension: 5/5  Right deltoid: 5/5  Left deltoid: 5/5  Right biceps: 5/5  Left biceps: 5/5  Right triceps: 5/5  Left triceps: 5/5  Right wrist flexion: 5/5  Left wrist flexion: 5/5  Right wrist extension: 5/5  Left wrist extension: 5/5  Right interossei: 5/5  Left interossei: 5/5  Right iliopsoas: 5/5  Left iliopsoas: 5/5  Right quadriceps: 5/5  Left quadriceps: 5/5  Right hamstrin/5  Left hamstrin/5  Right anterior tibial: 5/5  Left anterior tibial: 5/5  Right posterior tibial: 5/5  Left posterior tibial: 5/5  Right gastroc: 5/5  Left gastroc: 5/5    REFLEXES     Reflexes   Right brachioradialis: 2+  Left brachioradialis: 2+  Right biceps: 2+  Left biceps: 2+  Right triceps: 2+  Left triceps: 2+  Right patellar: 2+  Left patellar: 2+  Right achilles: 2+  Left achilles: 2+  Right plantar: normal  Left plantar: normal  Right Nunez: absent  Left Nunez: absent  Right ankle clonus: absent  Left ankle clonus: absent  Right pendular knee jerk: absent  Left pendular knee jerk: absent    SENSORY EXAM   Light touch normal.   Right arm light touch: normal  Left arm light touch: normal  Right leg light  touch: normal  Left leg light touch: normal  Vibration normal.   Right arm vibration: normal  Left arm vibration: normal  Right leg vibration: normal  Left leg vibration: normal  Proprioception normal.   Right arm proprioception: normal  Left arm proprioception: normal  Right leg proprioception: normal  Left leg proprioception: normal  Pinprick normal.   Right arm pinprick: normal  Left arm pinprick: normal  Right leg pinprick: normal  Left leg pinprick: normal  Graphesthesia: normal  Romberg: negative  Stereognosis: normal    GAIT AND COORDINATION     Gait  Gait: normal     Coordination   Finger to nose coordination: normal  Heel to shin coordination: normal  Tandem walking coordination: normal    Tremor   Resting tremor: absent  Intention tremor: absent  Action tremor: absent     Physical Exam  Vitals and nursing note reviewed.   Constitutional:       Appearance: Normal appearance.   HENT:      Head: Normocephalic.   Eyes:      Extraocular Movements: Extraocular movements intact and EOM normal.      Pupils: Pupils are equal, round, and reactive to light.   Cardiovascular:      Rate and Rhythm: Normal rate and regular rhythm.   Pulmonary:      Effort: Pulmonary effort is normal.      Breath sounds: Normal breath sounds.   Musculoskeletal:         General: No swelling or tenderness. Normal range of motion.      Cervical back: Normal range of motion and neck supple.      Right lower leg: No edema.      Left lower leg: No edema.   Skin:     General: Skin is warm and dry.      Coloration: Skin is not jaundiced.      Findings: No rash.   Neurological:      General: No focal deficit present.      Mental Status: She is alert and oriented to person, place, and time.      GCS: GCS eye subscore is 4. GCS verbal subscore is 5. GCS motor subscore is 6.      Cranial Nerves: No cranial nerve deficit.      Sensory: No sensory deficit.      Motor: Motor function is intact. No weakness.      Coordination: Coordination is intact.  Coordination normal. Finger-Nose-Finger Test, Heel to Shin Test and Romberg Test normal.      Gait: Gait is intact. Gait and tandem walk normal.      Deep Tendon Reflexes: Reflexes normal.      Reflex Scores:       Tricep reflexes are 2+ on the right side and 2+ on the left side.       Bicep reflexes are 2+ on the right side and 2+ on the left side.       Brachioradialis reflexes are 2+ on the right side and 2+ on the left side.       Patellar reflexes are 2+ on the right side and 2+ on the left side.       Achilles reflexes are 2+ on the right side and 2+ on the left side.  Psychiatric:         Mood and Affect: Mood normal.         Speech: Speech normal.         Behavior: Behavior normal.        Assessment:     Problem List Items Addressed This Visit          Neuro    Cerebral cyst    Cephalalgia - Primary    Relevant Orders    MRI Brain W WO Contrast       ENT    Tinnitus aurium, right       Other    Dizziness        Primary Diagnosis and ICD10  Other vascular headache [G44.1]    Plan:     Patient Instructions   MRI brain w/wo - compare to prior MRI in 2013 if possible  2. Keep followup with primary care    There are no discontinued medications.    Requested Prescriptions      No prescriptions requested or ordered in this encounter       Orders Placed This Encounter   Procedures    MRI Brain W WO Contrast

## 2022-11-09 ENCOUNTER — LAB VISIT (OUTPATIENT)
Dept: PRIMARY CARE CLINIC | Facility: CLINIC | Age: 42
End: 2022-11-09
Payer: COMMERCIAL

## 2022-11-09 ENCOUNTER — OFFICE VISIT (OUTPATIENT)
Dept: PRIMARY CARE CLINIC | Facility: CLINIC | Age: 42
End: 2022-11-09
Payer: COMMERCIAL

## 2022-11-09 VITALS
BODY MASS INDEX: 38.89 KG/M2 | TEMPERATURE: 98 F | RESPIRATION RATE: 18 BRPM | HEIGHT: 66 IN | WEIGHT: 242 LBS | DIASTOLIC BLOOD PRESSURE: 64 MMHG | OXYGEN SATURATION: 97 % | HEART RATE: 85 BPM | SYSTOLIC BLOOD PRESSURE: 118 MMHG

## 2022-11-09 DIAGNOSIS — Z02.83 ENCOUNTER FOR DRUG SCREENING: Primary | ICD-10-CM
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
## 2022-11-09 DIAGNOSIS — F41.9 ANXIETY: Primary | ICD-10-CM

## 2022-11-09 PROCEDURE — 99203 PR OFFICE/OUTPT VISIT, NEW, LEVL III, 30-44 MIN: ICD-10-PCS | Mod: ,,, | Performed by: NURSE PRACTITIONER

## 2022-11-09 PROCEDURE — 99000 PR SPECIMEN HANDLING,DR OFF->LAB: ICD-10-PCS | Mod: ,,, | Performed by: NURSE PRACTITIONER

## 2022-11-09 PROCEDURE — 99000 SPECIMEN HANDLING OFFICE-LAB: CPT | Mod: ,,, | Performed by: NURSE PRACTITIONER

## 2022-11-09 PROCEDURE — 99203 OFFICE O/P NEW LOW 30 MIN: CPT | Mod: ,,, | Performed by: NURSE PRACTITIONER

## 2022-11-09 RX ORDER — SERTRALINE HYDROCHLORIDE 25 MG/1
25 TABLET, FILM COATED ORAL DAILY
Qty: 30 TABLET | Refills: 11 | Status: SHIPPED | OUTPATIENT
Start: 2022-11-09 | End: 2023-11-09

## 2022-11-09 RX ORDER — HYDROXYZINE PAMOATE 25 MG/1
25 CAPSULE ORAL EVERY 8 HOURS PRN
Qty: 20 CAPSULE | Refills: 0 | Status: SHIPPED | OUTPATIENT
Start: 2022-11-09 | End: 2023-11-02
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
## 2022-11-09 NOTE — PROGRESS NOTES
Subjective:       Patient ID: Humera Guerrero is a 42 y.o. female.    Chief Complaint: No chief complaint on file.    HPI  Review of Systems      Objective:      Physical Exam    Assessment:       Problem List Items Addressed This Visit    None  Visit Diagnoses       Encounter for drug screening    -  Primary            Plan:       Drug testing only

## 2022-11-17 ENCOUNTER — HOSPITAL ENCOUNTER (OUTPATIENT)
Dept: RADIOLOGY | Facility: HOSPITAL | Age: 42
Discharge: HOME | End: 2022-11-17
Attending: NURSE PRACTITIONER
Payer: COMMERCIAL

## 2022-11-17 DIAGNOSIS — G44.1 OTHER VASCULAR HEADACHE: ICD-10-CM

## 2022-11-17 PROCEDURE — 70553 MRI BRAIN STEM W/O & W/DYE: CPT | Mod: TC

## 2022-11-17 PROCEDURE — 70553 MRI BRAIN W WO CONTRAST: ICD-10-PCS | Mod: 26,,, | Performed by: RADIOLOGY

## 2022-11-17 PROCEDURE — 70553 MRI BRAIN STEM W/O & W/DYE: CPT | Mod: 26,,, | Performed by: RADIOLOGY

## 2022-11-17 PROCEDURE — A9575 INJ GADOTERATE MEGLUMI 0.1ML: HCPCS | Performed by: NURSE PRACTITIONER

## 2022-11-17 PROCEDURE — 25500020 PHARM REV CODE 255: Performed by: NURSE PRACTITIONER

## 2022-11-17 RX ORDER — GADOTERATE MEGLUMINE 376.9 MG/ML
90 INJECTION INTRAVENOUS
Status: COMPLETED | OUTPATIENT
Start: 2022-11-17 | End: 2022-11-17

## 2022-11-17 RX ADMIN — GADOTERATE MEGLUMINE 20 ML: 376.9 INJECTION, SOLUTION INTRAVENOUS at 10:11

## 2022-11-21 ENCOUNTER — TELEPHONE (OUTPATIENT)
Dept: NEUROLOGY | Facility: CLINIC | Age: 42
End: 2022-11-21
Payer: COMMERCIAL

## 2022-11-21 NOTE — TELEPHONE ENCOUNTER
Called pt and emilie her the information below from QUINN Mcallister NP. She v/u.        ----- Message from SHANNON Sánchez sent at 11/19/2022  8:57 AM CST -----  Prior noted cyst from 2013 is unchanged when compared to last films from 2018

## 2022-11-22 ENCOUNTER — OFFICE VISIT (OUTPATIENT)
Dept: PRIMARY CARE CLINIC | Facility: CLINIC | Age: 42
End: 2022-11-22

## 2022-11-22 VITALS
WEIGHT: 242 LBS | HEART RATE: 80 BPM | SYSTOLIC BLOOD PRESSURE: 134 MMHG | DIASTOLIC BLOOD PRESSURE: 78 MMHG | OXYGEN SATURATION: 97 % | TEMPERATURE: 98 F | BODY MASS INDEX: 38.89 KG/M2 | RESPIRATION RATE: 18 BRPM | HEIGHT: 66 IN
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
## 2022-11-22 PROCEDURE — 99212 OFFICE O/P EST SF 10 MIN: CPT | Mod: ,,, | Performed by: NURSE PRACTITIONER

## 2022-11-22 PROCEDURE — 99212 PR OFFICE/OUTPT VISIT, EST, LEVL II, 10-19 MIN: ICD-10-PCS | Mod: ,,, | Performed by: NURSE PRACTITIONER
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
## 2022-12-02 ENCOUNTER — HOSPITAL ENCOUNTER (EMERGENCY)
Facility: HOSPITAL | Age: 42
Discharge: HOME | End: 2022-12-02
Payer: COMMERCIAL

## 2022-12-02 VITALS
DIASTOLIC BLOOD PRESSURE: 113 MMHG | WEIGHT: 242 LBS | OXYGEN SATURATION: 95 % | HEIGHT: 66 IN | BODY MASS INDEX: 38.89 KG/M2 | RESPIRATION RATE: 17 BRPM | HEART RATE: 83 BPM | SYSTOLIC BLOOD PRESSURE: 190 MMHG | TEMPERATURE: 98 F

## 2022-12-02 DIAGNOSIS — M54.32 SCIATICA OF LEFT SIDE: Primary | ICD-10-CM

## 2022-12-02 DIAGNOSIS — M47.816 FACET ARTHROPATHY, LUMBAR: ICD-10-CM

## 2022-12-02 DIAGNOSIS — R52 PAIN: ICD-10-CM

## 2022-12-02 DIAGNOSIS — M47.817 SPONDYLOSIS OF LUMBOSACRAL REGION, UNSPECIFIED SPINAL OSTEOARTHRITIS COMPLICATION STATUS: ICD-10-CM

## 2022-12-02 PROCEDURE — 96372 THER/PROPH/DIAG INJ SC/IM: CPT | Performed by: NURSE PRACTITIONER

## 2022-12-02 PROCEDURE — 99284 PR EMERGENCY DEPT VISIT,LEVEL IV: ICD-10-PCS | Mod: ,,, | Performed by: NURSE PRACTITIONER

## 2022-12-02 PROCEDURE — 99284 EMERGENCY DEPT VISIT MOD MDM: CPT

## 2022-12-02 PROCEDURE — 99284 EMERGENCY DEPT VISIT MOD MDM: CPT | Mod: ,,, | Performed by: NURSE PRACTITIONER

## 2022-12-02 PROCEDURE — 63600175 PHARM REV CODE 636 W HCPCS: Performed by: NURSE PRACTITIONER

## 2022-12-02 RX ORDER — GABAPENTIN 100 MG/1
100 CAPSULE ORAL 3 TIMES DAILY
Qty: 90 CAPSULE | Refills: 0 | Status: SHIPPED | OUTPATIENT
Start: 2022-12-02 | End: 2022-12-02 | Stop reason: SDUPTHER

## 2022-12-02 RX ORDER — IBUPROFEN 800 MG/1
800 TABLET ORAL EVERY 6 HOURS PRN
Qty: 20 TABLET | Refills: 0 | Status: SHIPPED | OUTPATIENT
Start: 2022-12-02 | End: 2022-12-02 | Stop reason: SDUPTHER

## 2022-12-02 RX ORDER — GABAPENTIN 100 MG/1
100 CAPSULE ORAL 3 TIMES DAILY
Qty: 90 CAPSULE | Refills: 0 | Status: SHIPPED | OUTPATIENT
Start: 2022-12-02 | End: 2023-01-05

## 2022-12-02 RX ORDER — IBUPROFEN 800 MG/1
800 TABLET ORAL EVERY 6 HOURS PRN
Qty: 20 TABLET | Refills: 0 | Status: SHIPPED | OUTPATIENT
Start: 2022-12-02 | End: 2022-12-14 | Stop reason: SDUPTHER

## 2022-12-02 RX ORDER — KETOROLAC TROMETHAMINE 30 MG/ML
30 INJECTION, SOLUTION INTRAMUSCULAR; INTRAVENOUS
Status: COMPLETED | OUTPATIENT
Start: 2022-12-02 | End: 2022-12-02

## 2022-12-02 RX ADMIN — KETOROLAC TROMETHAMINE 30 MG: 30 INJECTION, SOLUTION INTRAMUSCULAR; INTRAVENOUS at 06:12
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
## 2022-12-06 ENCOUNTER — OFFICE VISIT (OUTPATIENT)
Dept: PRIMARY CARE CLINIC | Facility: CLINIC | Age: 42
End: 2022-12-06
Payer: COMMERCIAL

## 2022-12-06 VITALS
WEIGHT: 242 LBS | TEMPERATURE: 98 F | HEIGHT: 66 IN | BODY MASS INDEX: 38.89 KG/M2 | SYSTOLIC BLOOD PRESSURE: 139 MMHG | HEART RATE: 84 BPM | RESPIRATION RATE: 18 BRPM | OXYGEN SATURATION: 97 % | DIASTOLIC BLOOD PRESSURE: 67 MMHG
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
## 2022-12-06 DIAGNOSIS — M54.42 ACUTE MIDLINE LOW BACK PAIN WITH LEFT-SIDED SCIATICA: ICD-10-CM

## 2022-12-06 PROCEDURE — 99213 OFFICE O/P EST LOW 20 MIN: CPT | Mod: ,,, | Performed by: NURSE PRACTITIONER

## 2022-12-06 PROCEDURE — 99213 PR OFFICE/OUTPT VISIT, EST, LEVL III, 20-29 MIN: ICD-10-PCS | Mod: ,,, | Performed by: NURSE PRACTITIONER

## 2022-12-06 RX ORDER — METHOCARBAMOL 500 MG/1
500 TABLET, FILM COATED ORAL 4 TIMES DAILY
Qty: 30 TABLET | Refills: 0 | Status: SHIPPED | OUTPATIENT
Start: 2022-12-06 | End: 2023-02-06

## 2022-12-06 RX ORDER — ATORVASTATIN CALCIUM 10 MG/1
10 TABLET, FILM COATED ORAL NIGHTLY
Qty: 90 TABLET | Refills: 3 | Status: SHIPPED | OUTPATIENT
Start: 2022-12-06 | End: 2023-11-13
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
## 2022-12-06 NOTE — ED NOTES
12/6/2022 @ [id number] pt called wanting a work note for yesterday & today. States that she was seen in the ED on Friday, 12/2/2022 & was off Sat & Sun but was not better so she did not go to work on Monday. Advised pt that if she is not better she needs to f/u with her PCP. Voiced understanding.

## 2022-12-14 ENCOUNTER — OFFICE VISIT (OUTPATIENT)
Dept: PRIMARY CARE CLINIC | Facility: CLINIC | Age: 42
End: 2022-12-14
Payer: COMMERCIAL

## 2022-12-14 VITALS
HEART RATE: 80 BPM | BODY MASS INDEX: 38.89 KG/M2 | RESPIRATION RATE: 18 BRPM | SYSTOLIC BLOOD PRESSURE: 147 MMHG | DIASTOLIC BLOOD PRESSURE: 66 MMHG | TEMPERATURE: 99 F | WEIGHT: 242 LBS | HEIGHT: 66 IN | OXYGEN SATURATION: 100 %

## 2022-12-14 DIAGNOSIS — M54.42 ACUTE MIDLINE LOW BACK PAIN WITH LEFT-SIDED SCIATICA: Primary | ICD-10-CM

## 2022-12-14 PROCEDURE — 99213 OFFICE O/P EST LOW 20 MIN: CPT | Mod: ,,, | Performed by: NURSE PRACTITIONER

## 2022-12-14 PROCEDURE — 99213 PR OFFICE/OUTPT VISIT, EST, LEVL III, 20-29 MIN: ICD-10-PCS | Mod: ,,, | Performed by: NURSE PRACTITIONER

## 2022-12-14 RX ORDER — IBUPROFEN 800 MG/1
800 TABLET ORAL EVERY 6 HOURS PRN
Qty: 20 TABLET | Refills: 0 | Status: SHIPPED | OUTPATIENT
Start: 2022-12-14 | End: 2023-03-27 | Stop reason: SDUPTHER
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
## 2023-01-05 ENCOUNTER — OFFICE VISIT (OUTPATIENT)
Dept: FAMILY MEDICINE | Facility: CLINIC | Age: 43
End: 2023-01-05
Payer: COMMERCIAL

## 2023-01-05 VITALS
OXYGEN SATURATION: 98 % | DIASTOLIC BLOOD PRESSURE: 84 MMHG | WEIGHT: 259.13 LBS | RESPIRATION RATE: 18 BRPM | TEMPERATURE: 98 F | HEIGHT: 66 IN | SYSTOLIC BLOOD PRESSURE: 129 MMHG | BODY MASS INDEX: 41.65 KG/M2 | HEART RATE: 81 BPM

## 2023-01-05 DIAGNOSIS — G89.29 CHRONIC LEFT-SIDED LOW BACK PAIN WITH LEFT-SIDED SCIATICA: Primary | ICD-10-CM

## 2023-01-05 DIAGNOSIS — M54.42 CHRONIC LEFT-SIDED LOW BACK PAIN WITH LEFT-SIDED SCIATICA: Primary | ICD-10-CM

## 2023-01-05 DIAGNOSIS — M10.9 GOUT, UNSPECIFIED CAUSE, UNSPECIFIED CHRONICITY, UNSPECIFIED SITE: ICD-10-CM

## 2023-01-05 DIAGNOSIS — R10.32 LEFT LOWER QUADRANT ABDOMINAL PAIN: ICD-10-CM

## 2023-01-05 PROCEDURE — 3079F PR MOST RECENT DIASTOLIC BLOOD PRESSURE 80-89 MM HG: ICD-10-PCS | Mod: ,,, | Performed by: NURSE PRACTITIONER

## 2023-01-05 PROCEDURE — 1159F PR MEDICATION LIST DOCUMENTED IN MEDICAL RECORD: ICD-10-PCS | Mod: ,,, | Performed by: NURSE PRACTITIONER

## 2023-01-05 PROCEDURE — 3008F PR BODY MASS INDEX (BMI) DOCUMENTED: ICD-10-PCS | Mod: ,,, | Performed by: NURSE PRACTITIONER

## 2023-01-05 PROCEDURE — 1160F RVW MEDS BY RX/DR IN RCRD: CPT | Mod: ,,, | Performed by: NURSE PRACTITIONER

## 2023-01-05 PROCEDURE — 99213 OFFICE O/P EST LOW 20 MIN: CPT | Mod: ,,, | Performed by: NURSE PRACTITIONER

## 2023-01-05 PROCEDURE — 3074F PR MOST RECENT SYSTOLIC BLOOD PRESSURE < 130 MM HG: ICD-10-PCS | Mod: ,,, | Performed by: NURSE PRACTITIONER

## 2023-01-05 PROCEDURE — 3074F SYST BP LT 130 MM HG: CPT | Mod: ,,, | Performed by: NURSE PRACTITIONER

## 2023-01-05 PROCEDURE — 1160F PR REVIEW ALL MEDS BY PRESCRIBER/CLIN PHARMACIST DOCUMENTED: ICD-10-PCS | Mod: ,,, | Performed by: NURSE PRACTITIONER

## 2023-01-05 PROCEDURE — 3008F BODY MASS INDEX DOCD: CPT | Mod: ,,, | Performed by: NURSE PRACTITIONER

## 2023-01-05 PROCEDURE — 1159F MED LIST DOCD IN RCRD: CPT | Mod: ,,, | Performed by: NURSE PRACTITIONER

## 2023-01-05 PROCEDURE — 99213 PR OFFICE/OUTPT VISIT, EST, LEVL III, 20-29 MIN: ICD-10-PCS | Mod: ,,, | Performed by: NURSE PRACTITIONER

## 2023-01-05 PROCEDURE — 3079F DIAST BP 80-89 MM HG: CPT | Mod: ,,, | Performed by: NURSE PRACTITIONER

## 2023-01-05 RX ORDER — GABAPENTIN 100 MG/1
100 CAPSULE ORAL 3 TIMES DAILY
Qty: 90 CAPSULE | Refills: 1 | Status: SHIPPED | OUTPATIENT
Start: 2023-01-05 | End: 2023-03-27 | Stop reason: SDUPTHER

## 2023-01-05 RX ORDER — COLCHICINE 0.6 MG/1
0.6 TABLET ORAL DAILY
Qty: 30 TABLET | Refills: 0 | Status: SHIPPED | OUTPATIENT
Start: 2023-01-05 | End: 2023-02-06
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
## 2023-01-05 NOTE — LETTER
January 5, 2023      [facility] [address] [place] [address]  Phone: [phone]  Fax: [phone]       Patient: Humera Guerrero   YOB: 1980  Date of Visit: 01/05/2023    To Whom It May Concern:    RG Guerrero  was at [facility] on 01/05/2023. The patient may return to work/school on 1/6/2023. If you have any questions or concerns, or if I can be of further assistance, please do not hesitate to contact me.    Sincerely,    SHANNON Callaway

## 2023-01-11 ENCOUNTER — HOSPITAL ENCOUNTER (OUTPATIENT)
Dept: RADIOLOGY | Facility: HOSPITAL | Age: 43
Discharge: HOME | End: 2023-01-11
Attending: NURSE PRACTITIONER
Payer: COMMERCIAL

## 2023-01-11 DIAGNOSIS — R10.32 LEFT LOWER QUADRANT ABDOMINAL PAIN: ICD-10-CM

## 2023-01-11 DIAGNOSIS — R10.9 ABDOMINAL PAIN, UNSPECIFIED ABDOMINAL LOCATION: Primary | ICD-10-CM

## 2023-01-11 DIAGNOSIS — K86.89 PANCREATIC MASS: Primary | ICD-10-CM

## 2023-01-11 PROCEDURE — 76700 US EXAM ABDOM COMPLETE: CPT | Mod: 26,,, | Performed by: RADIOLOGY

## 2023-01-11 PROCEDURE — 76700 US EXAM ABDOM COMPLETE: CPT | Mod: TC

## 2023-01-11 PROCEDURE — 76700 US ABDOMEN COMPLETE: ICD-10-PCS | Mod: 26,,, | Performed by: RADIOLOGY

## 2023-01-20 ENCOUNTER — HOSPITAL ENCOUNTER (OUTPATIENT)
Dept: RADIOLOGY | Facility: HOSPITAL | Age: 43
Discharge: HOME | End: 2023-01-20
Attending: NURSE PRACTITIONER
Payer: COMMERCIAL

## 2023-01-20 DIAGNOSIS — K86.89 PANCREATIC MASS: ICD-10-CM

## 2023-01-20 LAB — CREAT SERPL-MCNC: 1.1 MG/DL (ref 0.6–1.3)

## 2023-01-20 PROCEDURE — 82565 ASSAY OF CREATININE: CPT

## 2023-01-20 PROCEDURE — 74178 CT ABDOMEN PELVIS W WO CONTRAST: ICD-10-PCS | Mod: 26,,, | Performed by: RADIOLOGY

## 2023-01-20 PROCEDURE — 25500020 PHARM REV CODE 255: Performed by: NURSE PRACTITIONER

## 2023-01-20 PROCEDURE — 27202788 CT ABDOMEN PELVIS W WO CONTRAST

## 2023-01-20 PROCEDURE — 74178 CT ABD&PLV WO CNTR FLWD CNTR: CPT | Mod: 26,,, | Performed by: RADIOLOGY

## 2023-01-20 RX ADMIN — IOPAMIDOL 100 ML: 755 INJECTION, SOLUTION INTRAVENOUS at 08:01

## 2023-02-06 ENCOUNTER — OFFICE VISIT (OUTPATIENT)
Dept: FAMILY MEDICINE | Facility: CLINIC | Age: 43
End: 2023-02-06
Payer: COMMERCIAL

## 2023-02-06 VITALS
TEMPERATURE: 98 F | WEIGHT: 267.13 LBS | RESPIRATION RATE: 18 BRPM | SYSTOLIC BLOOD PRESSURE: 122 MMHG | HEIGHT: 66 IN | OXYGEN SATURATION: 96 % | DIASTOLIC BLOOD PRESSURE: 81 MMHG | BODY MASS INDEX: 42.93 KG/M2 | HEART RATE: 83 BPM

## 2023-02-06 DIAGNOSIS — G89.29 CHRONIC LEFT-SIDED LOW BACK PAIN WITH LEFT-SIDED SCIATICA: Primary | ICD-10-CM

## 2023-02-06 DIAGNOSIS — M54.42 CHRONIC LEFT-SIDED LOW BACK PAIN WITH LEFT-SIDED SCIATICA: Primary | ICD-10-CM

## 2023-02-06 DIAGNOSIS — I10 HYPERTENSION, UNSPECIFIED TYPE: ICD-10-CM

## 2023-02-06 PROBLEM — R42 DIZZINESS: Status: RESOLVED | Noted: 2022-11-03 | Resolved: 2023-02-06

## 2023-02-06 PROBLEM — M10.379: Status: RESOLVED | Noted: 2022-09-01 | Resolved: 2023-02-06

## 2023-02-06 PROCEDURE — 99213 PR OFFICE/OUTPT VISIT, EST, LEVL III, 20-29 MIN: ICD-10-PCS | Mod: ,,, | Performed by: NURSE PRACTITIONER

## 2023-02-06 PROCEDURE — 3008F BODY MASS INDEX DOCD: CPT | Mod: ,,, | Performed by: NURSE PRACTITIONER

## 2023-02-06 PROCEDURE — 3008F PR BODY MASS INDEX (BMI) DOCUMENTED: ICD-10-PCS | Mod: ,,, | Performed by: NURSE PRACTITIONER

## 2023-02-06 PROCEDURE — 3079F DIAST BP 80-89 MM HG: CPT | Mod: ,,, | Performed by: NURSE PRACTITIONER

## 2023-02-06 PROCEDURE — 3074F PR MOST RECENT SYSTOLIC BLOOD PRESSURE < 130 MM HG: ICD-10-PCS | Mod: ,,, | Performed by: NURSE PRACTITIONER

## 2023-02-06 PROCEDURE — 3074F SYST BP LT 130 MM HG: CPT | Mod: ,,, | Performed by: NURSE PRACTITIONER

## 2023-02-06 PROCEDURE — 1159F MED LIST DOCD IN RCRD: CPT | Mod: ,,, | Performed by: NURSE PRACTITIONER

## 2023-02-06 PROCEDURE — 1160F RVW MEDS BY RX/DR IN RCRD: CPT | Mod: ,,, | Performed by: NURSE PRACTITIONER

## 2023-02-06 PROCEDURE — 1160F PR REVIEW ALL MEDS BY PRESCRIBER/CLIN PHARMACIST DOCUMENTED: ICD-10-PCS | Mod: ,,, | Performed by: NURSE PRACTITIONER

## 2023-02-06 PROCEDURE — 3079F PR MOST RECENT DIASTOLIC BLOOD PRESSURE 80-89 MM HG: ICD-10-PCS | Mod: ,,, | Performed by: NURSE PRACTITIONER

## 2023-02-06 PROCEDURE — 1159F PR MEDICATION LIST DOCUMENTED IN MEDICAL RECORD: ICD-10-PCS | Mod: ,,, | Performed by: NURSE PRACTITIONER

## 2023-02-06 PROCEDURE — 99213 OFFICE O/P EST LOW 20 MIN: CPT | Mod: ,,, | Performed by: NURSE PRACTITIONER

## 2023-02-06 RX ORDER — LOSARTAN POTASSIUM 25 MG/1
25 TABLET ORAL DAILY
Qty: 90 TABLET | Refills: 3 | Status: SHIPPED | OUTPATIENT
Start: 2023-02-06 | End: 2023-11-02

## 2023-02-06 RX ORDER — COLCHICINE 0.6 MG/1
0.6 TABLET ORAL DAILY
Qty: 30 TABLET | Refills: 0 | Status: SHIPPED | OUTPATIENT
Start: 2023-02-06 | End: 2023-03-01

## 2023-02-06 NOTE — PROGRESS NOTES
SHANNON Callaway   [facility]      PATIENT NAME: Humera Guererro  : 1980  DATE: 23  MRN: [id number]      Patient PCP Information       Provider PCP Type    Sultana P SHANNON Neil [facility]            Reason for Visit / Chief Complaint: Back Pain (Follow up/Room 5///)         History of Present Illness / Problem Focused Workflow     Humera Guerrero presents to the clinic with Back Pain (Follow up/Room 5///)     Back Pain  Pertinent negatives include no abdominal pain, chest pain, dysuria, fever, headaches, numbness or weakness.   Ms Guerrero is here today for follow up. Reports back with sciatica has improved some since starting neurontin.   Patient still awaiting insurance approval to start PT.   Patient reports improvement in left lower abd pain. Prior us and ct stable. Patient has increased fiber in diet with regulation of bowel mvmts.   Ms Cuevas BP has trended up over last couple of months. Patient not currently taking antihypertensive medication. +Family hx of htn. BMI 43    Review of Systems     Review of Systems   Constitutional:  Negative for activity change, appetite change, chills, diaphoresis, fatigue, fever and unexpected weight change.   HENT:  Negative for congestion, ear pain, facial swelling, hearing loss, nosebleeds and sore throat.    Eyes: Negative.    Respiratory:  Negative for apnea, cough, shortness of breath and wheezing.    Cardiovascular:  Negative for chest pain, palpitations and leg swelling.   Gastrointestinal:  Negative for abdominal distention, abdominal pain, blood in stool, constipation, diarrhea and nausea.   Endocrine: Negative for cold intolerance, heat intolerance, polydipsia, polyphagia and polyuria.   Genitourinary:  Negative for decreased urine volume, difficulty urinating, dysuria, flank pain, frequency, hematuria and urgency.   Musculoskeletal:  Positive for back pain. Negative for arthralgias, joint swelling and myalgias.   Skin:  Negative for color  change and rash.   Allergic/Immunologic: Negative.    Neurological:  Negative for dizziness, tremors, seizures, syncope, facial asymmetry, speech difficulty, weakness, light-headedness, numbness and headaches.   Hematological:  Negative for adenopathy. Does not bruise/bleed easily.   Psychiatric/Behavioral:  Negative for behavioral problems and confusion.      Medical / Social / Family History     Past Medical History:   Diagnosis Date    Cerebral cyst     Other hyperlipidemia     Prediabetes     Vitamin D insufficiency        Past Surgical History:   Procedure Laterality Date     SECTION      HYSTERECTOMY Bilateral     TUBAL LIGATION         Social History  Ms.  reports that she has quit smoking. Her smoking use included cigars. She has been exposed to tobacco smoke. She has never used smokeless tobacco. She reports current alcohol use. She reports that she does not use drugs.    Family History  Ms.'s family history includes Breast cancer in her paternal grandmother; Cancer in her paternal grandmother; Hypertension in her maternal grandmother and mother; Other in her mother.    Medications and Allergies     Medications  Outpatient Medications Marked as Taking for the 23 encounter (Office Visit) with SHANNON Callaway   Medication Sig Dispense Refill    atorvastatin (LIPITOR) 10 MG tablet Take 1 tablet (10 mg total) by mouth every evening. 90 tablet 3    estradioL (ESTRACE) 1 MG tablet Take 1 mg by mouth once daily.      gabapentin (NEURONTIN) 100 MG capsule Take 1 capsule (100 mg total) by mouth 3 (three) times daily. 90 capsule 1    hydrOXYzine pamoate (VISTARIL) 25 MG Cap Take 1 capsule (25 mg total) by mouth every 8 (eight) hours as needed (anxiety). 20 capsule 0    ibuprofen (ADVIL,MOTRIN) 800 MG tablet Take 1 tablet (800 mg total) by mouth every 6 (six) hours as needed for Pain. 20 tablet 0    sertraline (ZOLOFT) 25 MG tablet Take 1 tablet (25 mg total) by mouth once daily. 30 tablet 11     "[DISCONTINUED] colchicine (COLCRYS) 0.6 mg tablet Take 1 tablet (0.6 mg total) by mouth once daily. 30 tablet 0       Allergies  Review of patient's allergies indicates:  No Known Allergies    Physical Examination     Vitals:    02/06/23 0805 02/06/23 0812   BP: (!) 155/91 122/81   BP Location: Left arm Right arm   Patient Position: Sitting Sitting   Pulse: 83    Resp: 18    Temp: 98 °F (36.7 °C)    SpO2: 96%    Weight: 121.2 kg (267 lb 2 oz)    Height: 5' 6" (1.676 m)        Physical Exam  Vitals and nursing note reviewed.   Constitutional:       Appearance: Normal appearance. She is obese.   HENT:      Head: Normocephalic.      Right Ear: External ear normal.      Left Ear: External ear normal.      Nose: Nose normal.      Mouth/Throat:      Mouth: Mucous membranes are moist.   Eyes:      Extraocular Movements: Extraocular movements intact.      Conjunctiva/sclera: Conjunctivae normal.      Pupils: Pupils are equal, round, and reactive to light.   Cardiovascular:      Rate and Rhythm: Normal rate and regular rhythm.      Pulses: Normal pulses.      Heart sounds: Normal heart sounds. No murmur heard.  Pulmonary:      Effort: Pulmonary effort is normal.      Breath sounds: Normal breath sounds. No stridor. No wheezing or rhonchi.   Abdominal:      General: Bowel sounds are normal. There is no distension.      Palpations: Abdomen is soft. There is no mass.      Tenderness: There is no abdominal tenderness.   Musculoskeletal:         General: No swelling or tenderness. Normal range of motion.      Cervical back: Normal range of motion and neck supple.      Right lower leg: No edema.      Left lower leg: No edema.   Skin:     General: Skin is warm and dry.      Capillary Refill: Capillary refill takes less than 2 seconds.   Neurological:      General: No focal deficit present.      Mental Status: She is alert and oriented to person, place, and time. Mental status is at baseline.      Cranial Nerves: No cranial nerve " deficit.      Sensory: No sensory deficit.      Motor: No weakness.   Psychiatric:         Mood and Affect: Mood normal.         Behavior: Behavior normal.         Thought Content: Thought content normal.         Judgment: Judgment normal.         No visits with results within 14 Day(s) from this visit.   Latest known visit with results is:   Hospital Outpatient Visit on 01/20/2023   Component Date Value Ref Range Status    POC Creatinine 01/20/2023 1.1  0.60 - 1.30 mg/dL Final             Assessment and Plan (including Health Maintenance)       Plan:   Chronic left-sided low back pain with left-sided sciatica  -continue nsaid and neurontin prn  -PT recommended when approved by insurance  - weight loss recommended      Hypertension, unspecified type  -     losartan (COZAAR) 25 MG tablet; Take 1 tablet (25 mg total) by mouth once daily.  Dispense: 90 tablet; Refill: 3  -     DASH diet     Medication refill  -     colchicine (COLCRYS) 0.6 mg tablet; Take 1 tablet (0.6 mg total) by mouth once daily.  Dispense: 30 tablet; Refill: 0  Follow up in April for wellness as scheduled        There are no Patient Instructions on file for this visit.       Health Maintenance Due   Topic Date Due    COVID-19 Vaccine (1) Never done    TETANUS VACCINE  Never done    Influenza Vaccine (1) Never done         There is no immunization history on file for this patient.     Problem List Items Addressed This Visit          Cardiac/Vascular    Hypertension    Relevant Medications    losartan (COZAAR) 25 MG tablet     Other Visit Diagnoses       Chronic left-sided low back pain with left-sided sciatica    -  Primary            Health Maintenance Topics with due status: Not Due       Topic Last Completion Date    Hemoglobin A1c (Prediabetes) 07/18/2022    Mammogram 11/04/2022       Future Appointments   Date Time Provider Department Center   4/10/2023 10:00 AM SHANNON Callaway [facility]            Signature:  Sultana WEATHERS  SHANNON Neil  [facility]     Date of encounter: 2/6/23

## 2023-02-06 NOTE — LETTER
February 6, 2023      [facility] [address] [place] [address]  Phone: [phone]  Fax: [phone]       Patient: Humera Guerrero   YOB: 1980  Date of Visit: 02/06/2023    To Whom It May Concern:    RG Guerrero  was at [facility] on 02/06/2023. The patient may return to work/school on 2/6/2023 with no restrictions. If you have any questions or concerns, or if I can be of further assistance, please do not hesitate to contact me.    Sincerely,    SHANNON Callaway

## 2023-02-23 ENCOUNTER — CLINICAL SUPPORT (OUTPATIENT)
Dept: REHABILITATION | Facility: HOSPITAL | Age: 43
End: 2023-02-23
Payer: COMMERCIAL

## 2023-02-23 DIAGNOSIS — M54.42 ACUTE LEFT-SIDED LOW BACK PAIN WITH LEFT-SIDED SCIATICA: ICD-10-CM
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
## 2023-02-23 PROBLEM — M54.40 ACUTE LEFT-SIDED LOW BACK PAIN WITH SCIATICA: Status: ACTIVE | Noted: 2023-02-23

## 2023-02-23 PROCEDURE — 97161 PT EVAL LOW COMPLEX 20 MIN: CPT
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
## 2023-02-27 ENCOUNTER — CLINICAL SUPPORT (OUTPATIENT)
Dept: REHABILITATION | Facility: HOSPITAL | Age: 43
End: 2023-02-27
Payer: COMMERCIAL

## 2023-02-27 DIAGNOSIS — M54.42 ACUTE LEFT-SIDED LOW BACK PAIN WITH LEFT-SIDED SCIATICA: Primary | ICD-10-CM

## 2023-02-27 PROCEDURE — 97110 THERAPEUTIC EXERCISES: CPT

## 2023-02-27 PROCEDURE — 97112 NEUROMUSCULAR REEDUCATION: CPT

## 2023-02-27 NOTE — PROGRESS NOTES
[facility]   Physical Therapy Treatment Note     Name: Humera Guerrero  Clinic Number: [id number]    Therapy Diagnosis:   Encounter Diagnosis   Name Primary?    Acute left-sided low back pain with left-sided sciatica Yes     Physician: Gay Locke FNP    Visit Date: 2/27/2023       Therapy Diagnosis: No diagnosis found.   Physician: Gay Locke FNP    Physician Orders: PT Eval and Treat  Medical Diagnosis from Referral: Acute midline low back pain with left-sided sc  Evaluation Date: 2/23/2023  Authorization Period Expiration: 12/14/2023  Plan of Care Expiration: 3/6/2023                              Visit # / Visits authorized: 1 / 1 (eval)                       FOTO: Visit #1 - Scored : 1 / 3      PRECAUTIONS: Standard Precautions        PTA Visit #: 0/5     Time In: 11:27 AM  Time Out: 12:05 AM  Total Billable Time: 38 minutes    SUBJECTIVE     Pt reports: She has some burning and tingling this morning, she woke up stiff  He/She was compliant with home exercise program.  Response to previous treatment: Stretching is helping  Functional change: ongoing    Pain: 5/10  Location: left back  / Left LE    OBJECTIVE     Objective Measures updated at progress report unless specified.     Treatment     Time to Complete Exercises:     HUMERA received therapeutic exercises to develop strength, endurance, ROM, flexibility, and posture for  minutes including: x = exercises performed     TherEx 2/27/2023    LE Bike x 5 min   Calf Stretch x 3 x 20 sec   Hamstring Stretch x 3 x 20 sec   Piriformis Stretch x 3 x 20 sec   Lumbar rotation bilateral x 2 x 20   DKTC  x X 30    Seated Lumbar stretch forward / lateral  x 5 x 5 sec hold               Plan for Next Visit:      HUMERA received the following manual therapy techniques: Joint mobilizations, Manual traction, Myofacial release, Manual Lymphatic Drainage, Soft tissue Mobilization, and Friction Massage were applied to the:  for  () minutes, including:    Manual Intervention Performed Today    Soft Tissue Mobilization      Joint Mobilizations     Mobilization with movement     Active release     Functional Dry Needling       Plan for Next Visit:      NEUROMUSCULAR RE-EDUCATION ACTIVITIES to improve Balance, Coordination, Kinesthetic, Sense, Proprioception, and Posture for () minutes.  The following were included:    Intervention Performed Today    Posterior Pelvic Tilt     Supine TrA Brace March x 2 x 10   Brdige TrA Brace x 2 x 10                              Plan for Next Visit:          PATIENT EDUCATION AND HOME EXERCISES     Home Exercises Provided and Patient Education Provided     Education provided:    Patient educated on biomechanical justification for therapeutic exercise and importance of compliance with HEP in order to improve overall impairments and QOL   Patient was educated on all the above exercise prior/during/after for proper posture, positioning, and execution for safe performance with home exercise program.       Written Home Exercises Provided: yes.  Exercises were reviewed and HUMERA was able to demonstrate them prior to the end of the session.  HUMERA demonstrated good  understanding of the education provided. See EMR under Patient Instructions for exercises provided during therapy sessions.      ASSESSMENT     HUMERA Guerrero tolerated PT session well with no  complaints of pain or discomfort.  Therapy exercises were reviewed by revisiting exercises given from previous home exercise program while adding no new interventions.  Handouts were issued during today's visit. Humera demonstrated good understanding of new exercises and will continue to progress at home until next follow-up.       HUMERA Is progressing well towards her goals.   Pt prognosis is Good.     Pt will continue to benefit from skilled outpatient physical therapy to address the deficits listed in the problem list box on initial evaluation,  provide pt/family education and to maximize pt's level of independence in the home and community environment.     Pt's spiritual, cultural and educational needs considered and pt agreeable to plan of care and goals.     Anticipated barriers to physical therapy: co-morbidities, sedentary lifestyle, and chronicity of condition    GOALS:  SHORT TERM GOALS: 3 weeks,  Progress   Recent signs and systems trend is improving in order to progress towards LTG's.     Patient will be independent with HEP in order to further progress and return to maximal function.     Pain rating at Worst: 5/10 in order to progress towards increased independence with activity.     Patient will be able to correct postural deviations in sitting and standing, to decrease pain and promote postural awareness for injury prevention.         LONG TERM GOALS: 6 weeks,  Progress   Patient will return to normal ADL, recreational, and work related activities with less pain and limitation.      Patient will improve AROM to stated goals in order to return to maximal functional potential.      Patient will improve Strength to stated goals of appropriate musculature in order to improve functional independence.      Pain Rating at Best: 1/10 to improve Quality of Life.      Patient will meet predicted functional outcome (FOTO) score: 5 points to increase self-worth & perceived functional ability.          PC = progressing/continue  PM= partially met  M=met    PLAN     Continue Plan of Care (POC) and progress per patient tolerance. See Treatment section for exercise progression.    Merline Nguyen, PT

## 2023-03-01 ENCOUNTER — CLINICAL SUPPORT (OUTPATIENT)
Dept: REHABILITATION | Facility: HOSPITAL | Age: 43
End: 2023-03-01
Payer: COMMERCIAL

## 2023-03-01 DIAGNOSIS — M54.42 ACUTE LEFT-SIDED LOW BACK PAIN WITH LEFT-SIDED SCIATICA: Primary | ICD-10-CM

## 2023-03-01 PROCEDURE — 97110 THERAPEUTIC EXERCISES: CPT

## 2023-03-01 PROCEDURE — 97014 ELECTRIC STIMULATION THERAPY: CPT

## 2023-03-01 NOTE — PROGRESS NOTES
[facility]   Physical Therapy Treatment Note     Name: Humera Guerrero  Clinic Number: [id number]    Therapy Diagnosis:   Encounter Diagnosis   Name Primary?    Acute left-sided low back pain with left-sided sciatica Yes     Physician: Gay Locke FNP    Visit Date: 3/1/2023       Therapy Diagnosis: No diagnosis found.   Physician: Gay Locke FNP    Physician Orders: PT Eval and Treat  Medical Diagnosis from Referral: Acute midline low back pain with left-sided sc  Evaluation Date: 2/23/2023  Authorization Period Expiration: 12/14/2023  Plan of Care Expiration: 3/6/2023                              Visit # / Visits authorized: 1 / 1 (eval)                       FOTO: Visit #1 - Scored : 1 / 3      PRECAUTIONS: Standard Precautions        PTA Visit #: 0/5     Time In: 3:00 PM  Time Out:  3:45 PM  Total Billable Time: 45 minutes    SUBJECTIVE     Pt reports: She has more stiffness today and after the last treatment difficulty with stepping into the car with LLE.    He/She was compliant with home exercise program.  Response to previous treatment: Stretching is helping  Functional change: ongoing    Pain: 5/10  Location: left back  / Left LE    OBJECTIVE     Objective Measures updated at progress report unless specified.     Treatment     Time to Complete Exercises:     HUMERA received therapeutic exercises to develop strength, endurance, ROM, flexibility, and posture for  minutes including: x = exercises performed     TherEx 3/1/2023    LE Bike x 5 min   Calf Stretch x 3 x 20 sec   Hamstring Stretch x 3 x 20 sec   Piriformis Stretch  3 x 20 sec   Lumbar rotation bilateral x 1 x 20   DKTC   X 30    Seated Lumbar stretch forward / lateral  x 5 x 5 sec hold   Cybex back extension  x 3 x 10 #3   Cybex Hip abduction x 1 x 15     Plan for Next Visit:      HUMERA received the following manual therapy techniques: Joint mobilizations, Manual traction, Myofacial release,  Manual Lymphatic Drainage, Soft tissue Mobilization, and Friction Massage were applied to the:  for () minutes, including:    Manual Intervention Performed Today    Soft Tissue Mobilization      Joint Mobilizations     Mobilization with movement     Active release     Functional Dry Needling       Plan for Next Visit:      NEUROMUSCULAR RE-EDUCATION ACTIVITIES to improve Balance, Coordination, Kinesthetic, Sense, Proprioception, and Posture for () minutes.  The following were included:    Intervention Performed Today    Posterior Pelvic Tilt x X 10   Supine TrA Brace March x 2 x 10   Brdige TrA Brace  2 x 10                              Plan for Next Visit:         Electrical Muscle Stimulation Performed TIME Intensity (mA)  Details   Lumbar paraspinals pre mod  With MHP in prone using 6 towel layers x 15 6 volts Linear bracket formation with 2 leads               STEM SETTINGS:  Hz, 150-250 ms      PATIENT EDUCATION AND HOME EXERCISES     Home Exercises Provided and Patient Education Provided     Education provided:    Patient educated on biomechanical justification for therapeutic exercise and importance of compliance with HEP in order to improve overall impairments and QOL   Patient was educated on all the above exercise prior/during/after for proper posture, positioning, and execution for safe performance with home exercise program.       Written Home Exercises Provided: yes.  Exercises were reviewed and CECILEALCIRABRAYAN was able to demonstrate them prior to the end of the session.  JOAN demonstrated good  understanding of the education provided. See EMR under Patient Instructions for exercises provided during therapy sessions.      ASSESSMENT     JOAN Guerrero tolerated PT session well with minimal  complaints of pain or discomfort.  Patient said she had a rough day, she was unable to perform standing strengthening exercise without pain. End of session with tens unit to decrease pain.  Therapy exercises  were reviewed by revisiting exercises given from previous home exercise program while adding no new interventions.  Handouts were issued during today's visit. Humera demonstrated good understanding of new exercises and will continue to progress at home until next follow-up.       HUMERA Is progressing well towards her goals.   Pt prognosis is Fair.     Pt will continue to benefit from skilled outpatient physical therapy to address the deficits listed in the problem list box on initial evaluation, provide pt/family education and to maximize pt's level of independence in the home and community environment.     Pt's spiritual, cultural and educational needs considered and pt agreeable to plan of care and goals.     Anticipated barriers to physical therapy: co-morbidities, sedentary lifestyle, and chronicity of condition    GOALS:  SHORT TERM GOALS: 3 weeks,  Progress   Recent signs and systems trend is improving in order to progress towards LTG's.     Patient will be independent with HEP in order to further progress and return to maximal function.     Pain rating at Worst: 5/10 in order to progress towards increased independence with activity.     Patient will be able to correct postural deviations in sitting and standing, to decrease pain and promote postural awareness for injury prevention.         LONG TERM GOALS: 6 weeks,  Progress   Patient will return to normal ADL, recreational, and work related activities with less pain and limitation.      Patient will improve AROM to stated goals in order to return to maximal functional potential.      Patient will improve Strength to stated goals of appropriate musculature in order to improve functional independence.      Pain Rating at Best: 1/10 to improve Quality of Life.      Patient will meet predicted functional outcome (FOTO) score: 5 points to increase self-worth & perceived functional ability.          PC = progressing/continue  PM= partially  met  M=met    PLAN     Continue Plan of Care (POC) and progress per patient tolerance. See Treatment section for exercise progression.    Merline Nguyen, PT

## 2023-03-06 ENCOUNTER — CLINICAL SUPPORT (OUTPATIENT)
Dept: REHABILITATION | Facility: HOSPITAL | Age: 43
End: 2023-03-06
Payer: COMMERCIAL

## 2023-03-06 DIAGNOSIS — M54.42 ACUTE LEFT-SIDED LOW BACK PAIN WITH LEFT-SIDED SCIATICA: Primary | ICD-10-CM

## 2023-03-06 PROCEDURE — 97110 THERAPEUTIC EXERCISES: CPT | Mod: CQ

## 2023-03-06 PROCEDURE — 97014 ELECTRIC STIMULATION THERAPY: CPT | Mod: CQ

## 2023-03-06 NOTE — PROGRESS NOTES
[facility]   Physical Therapy Treatment Note     Name: Humera Guerrero  Clinic Number: [id number]    Therapy Diagnosis:   Encounter Diagnosis   Name Primary?    Acute left-sided low back pain with left-sided sciatica Yes     Physician: Gay Locke FNP    Visit Date: 3/6/2023       Therapy Diagnosis: No diagnosis found.   Physician: Gay Locke FNP    Physician Orders: PT Eval and Treat  Medical Diagnosis from Referral: Acute midline low back pain with left-sided sc  Evaluation Date: 2/23/2023  Authorization Period Expiration: 12/14/2023  Plan of Care Expiration: 3/6/2023                              Visit # / Visits authorized: 4/ 15                  FOTO: Visit #1 - Scored : 1 / 3      PRECAUTIONS: Standard Precautions        PTA Visit #: 1/5     Time In: 3:00 PM  Time Out:  3:53 PM  Total Billable Time: 53 minutes    SUBJECTIVE     Pt reports: I am having a good day.    He/She was compliant with home exercise program.  Response to previous treatment: Stretching is helping  Functional change: ongoing    Pain: 5/10  Location: left back  / Left LE    OBJECTIVE     Objective Measures updated at progress report unless specified.     Treatment     Time to Complete Exercises:     HUMERA received therapeutic exercises to develop strength, endurance, ROM, flexibility, and posture for  minutes including: x = exercises performed     TherEx 3/6/2023    LE Bike x 5 min   Calf Stretch x 3 x 20 sec   Hamstring Stretch x 3 x 20 sec   Piriformis Stretch x 3 x 20 sec   Lumbar rotation bilateral x 1 x 20   DKTC   X 30    Seated Lumbar stretch forward / lateral  x 5 x 5 sec hold   Cybex back extension  x 3 x 10 #3   Cybex Hip abduction x 1 x 15     Plan for Next Visit:      HUMERA received the following manual therapy techniques: Joint mobilizations, Manual traction, Myofacial release, Manual Lymphatic Drainage, Soft tissue Mobilization, and Friction Massage were applied to the:   for () minutes, including:    Manual Intervention Performed Today    Soft Tissue Mobilization      Joint Mobilizations     Mobilization with movement     Active release     Functional Dry Needling       Plan for Next Visit:      NEUROMUSCULAR RE-EDUCATION ACTIVITIES to improve Balance, Coordination, Kinesthetic, Sense, Proprioception, and Posture.  The following were included:    Intervention Performed Today    Posterior Pelvic Tilt x X 10   Supine TrA Brace March x 2 x 10   Brdige TrA Brace  2 x 10                              Plan for Next Visit:         Electrical Muscle Stimulation Performed TIME Intensity (mA)  Details   Lumbar paraspinals pre mod  With MHP in prone using 6 towel layers x 15 6 volts Linear bracket formation with 2 leads               STEM SETTINGS:  Hz, 150-250 ms      PATIENT EDUCATION AND HOME EXERCISES     Home Exercises Provided and Patient Education Provided     Education provided:    Patient educated on biomechanical justification for therapeutic exercise and importance of compliance with HEP in order to improve overall impairments and QOL   Patient was educated on all the above exercise prior/during/after for proper posture, positioning, and execution for safe performance with home exercise program.       Written Home Exercises Provided: yes.  Exercises were reviewed and JOAN was able to demonstrate them prior to the end of the session.  JOAN demonstrated good  understanding of the education provided. See EMR under Patient Instructions for exercises provided during therapy sessions.      ASSESSMENT     JOAN Guerrero tolerated PT session well with minimal  complaints of pain or discomfort. Ended session with pre-mod and MHP for pain in lower back.  Therapy exercises were reviewed by revisiting exercises given from previous home exercise program while adding no new interventions.   JOAN Is progressing well towards her goals.   Pt prognosis is Fair.     Pt will  continue to benefit from skilled outpatient physical therapy to address the deficits listed in the problem list box on initial evaluation, provide pt/family education and to maximize pt's level of independence in the home and community environment.     Pt's spiritual, cultural and educational needs considered and pt agreeable to plan of care and goals.     Anticipated barriers to physical therapy: co-morbidities, sedentary lifestyle, and chronicity of condition    GOALS:  SHORT TERM GOALS: 3 weeks,  Progress   Recent signs and systems trend is improving in order to progress towards LTG's.     Patient will be independent with HEP in order to further progress and return to maximal function.     Pain rating at Worst: 5/10 in order to progress towards increased independence with activity.     Patient will be able to correct postural deviations in sitting and standing, to decrease pain and promote postural awareness for injury prevention.         LONG TERM GOALS: 6 weeks,  Progress   Patient will return to normal ADL, recreational, and work related activities with less pain and limitation.      Patient will improve AROM to stated goals in order to return to maximal functional potential.      Patient will improve Strength to stated goals of appropriate musculature in order to improve functional independence.      Pain Rating at Best: 1/10 to improve Quality of Life.      Patient will meet predicted functional outcome (FOTO) score: 5 points to increase self-worth & perceived functional ability.          PC = progressing/continue  PM= partially met  M=met    PLAN     Continue Plan of Care (POC) and progress per patient tolerance. See Treatment section for exercise progression.    Madelaine Mobley, PTA

## 2023-03-08 ENCOUNTER — CLINICAL SUPPORT (OUTPATIENT)
Dept: REHABILITATION | Facility: HOSPITAL | Age: 43
End: 2023-03-08
Payer: COMMERCIAL

## 2023-03-08 DIAGNOSIS — M54.42 ACUTE LEFT-SIDED LOW BACK PAIN WITH LEFT-SIDED SCIATICA: Primary | ICD-10-CM

## 2023-03-08 PROCEDURE — 97112 NEUROMUSCULAR REEDUCATION: CPT

## 2023-03-08 PROCEDURE — 97110 THERAPEUTIC EXERCISES: CPT

## 2023-03-08 PROCEDURE — 97140 MANUAL THERAPY 1/> REGIONS: CPT

## 2023-03-08 NOTE — PROGRESS NOTES
[facility]   Physical Therapy Treatment Note     Name: Humera Guerrero  Clinic Number: [id number]    Therapy Diagnosis:   Encounter Diagnosis   Name Primary?    Acute left-sided low back pain with left-sided sciatica Yes     Physician: Gay Locke FNP    Visit Date: 3/8/2023       Therapy Diagnosis: No diagnosis found.   Physician: Gay Locke FNP    Physician Orders: PT Eval and Treat  Medical Diagnosis from Referral: Acute midline low back pain with left-sided sc  Evaluation Date: 2/23/2023  Authorization Period Expiration: 12/14/2023  Plan of Care Expiration: 3/6/2023                              Visit # / Visits authorized: 4/ 15                  FOTO: Visit #1 - Scored : 1 / 3      PRECAUTIONS: Standard Precautions        PTA Visit #: 0/5     Time In: 3:10 PM  Time Out:  3:50  PM  Total Billable Time: 40 minutes    SUBJECTIVE     Pt reports: I am having a good day.    He/She was compliant with home exercise program.  Response to previous treatment: Stretching is helping  Functional change: ongoing    Pain: 5/10  Location: left back  / Left LE    OBJECTIVE     Objective Measures updated at progress report unless specified.     Treatment     Time to Complete Exercises:     HUMERA received therapeutic exercises to develop strength, endurance, ROM, flexibility, and posture for  minutes including: x = exercises performed     TherEx 3/8/2023    LE Bike x 5 min   Calf Stretch x 3 x 20 sec   Hamstring Stretch x 3 x 20 sec   Piriformis Stretch  3 x 20 sec   Lumbar rotation bilateral x 1 x 20   DKTC  x X 30    Seated Lumbar stretch forward / lateral   5 x 5 sec hold   Cybex back extension   3 x 10 #3   Cybex Hip abduction  1 x 15          Plan for Next Visit:      HUMERA received the following manual therapy techniques: Joint mobilizations, Manual traction, Myofacial release, Manual Lymphatic Drainage, Soft tissue Mobilization, and Friction Massage were applied to the:   for (10) minutes, including:    Manual Intervention Performed Today    Soft Tissue Mobilization x  Lumbar Splay   Joint Mobilizations x PA glides Grades I-V   Mobilization with movement     Active release     Functional Dry Needling       Plan for Next Visit:      NEUROMUSCULAR RE-EDUCATION ACTIVITIES to improve Balance, Coordination, Kinesthetic, Sense, Proprioception, and Posture for 10 min .  The following were included:    Intervention Performed Today    Posterior Pelvic Tilt  X 10   Supine TrA Brace March  2 x 10   Brdige TrA Brace  2 x 10   Open Book SL x 2 x 10   Standing thoracic extension with shoulder abduction  x 2 x 10   Ball Squats x 2 x 10               Plan for Next Visit:         Electrical Muscle Stimulation Performed TIME Intensity (mA)  Details   Lumbar paraspinals pre mod  With MHP in prone using 6 towel layers x 15 6 volts Linear bracket formation with 2 leads               STEM SETTINGS:  Hz, 150-250 ms      PATIENT EDUCATION AND HOME EXERCISES     Home Exercises Provided and Patient Education Provided     Education provided:    Patient educated on biomechanical justification for therapeutic exercise and importance of compliance with HEP in order to improve overall impairments and QOL   Patient was educated on all the above exercise prior/during/after for proper posture, positioning, and execution for safe performance with home exercise program.       Written Home Exercises Provided: yes.  Exercises were reviewed and JOAN was able to demonstrate them prior to the end of the session.  JOAN demonstrated good  understanding of the education provided. See EMR under Patient Instructions for exercises provided during therapy sessions.      ASSESSMENT     JOAN Guerrero tolerated PT session well with minimal  complaints of pain or discomfort. Ended session with pre-mod and MHP for pain in lower back.  Therapy exercises were reviewed by revisiting exercises given from previous home  exercise program while adding no new interventions.   JOAN Is progressing well towards her goals.   Pt prognosis is Fair.     Pt will continue to benefit from skilled outpatient physical therapy to address the deficits listed in the problem list box on initial evaluation, provide pt/family education and to maximize pt's level of independence in the home and community environment.     Pt's spiritual, cultural and educational needs considered and pt agreeable to plan of care and goals.     Anticipated barriers to physical therapy: co-morbidities, sedentary lifestyle, and chronicity of condition    GOALS:  SHORT TERM GOALS: 3 weeks,  Progress   Recent signs and systems trend is improving in order to progress towards LTG's.     Patient will be independent with HEP in order to further progress and return to maximal function.     Pain rating at Worst: 5/10 in order to progress towards increased independence with activity.     Patient will be able to correct postural deviations in sitting and standing, to decrease pain and promote postural awareness for injury prevention.         LONG TERM GOALS: 6 weeks,  Progress   Patient will return to normal ADL, recreational, and work related activities with less pain and limitation.      Patient will improve AROM to stated goals in order to return to maximal functional potential.      Patient will improve Strength to stated goals of appropriate musculature in order to improve functional independence.      Pain Rating at Best: 1/10 to improve Quality of Life.      Patient will meet predicted functional outcome (FOTO) score: 5 points to increase self-worth & perceived functional ability.          PC = progressing/continue  PM= partially met  M=met    PLAN     Continue Plan of Care (POC) and progress per patient tolerance. See Treatment section for exercise progression.    Merline Nguyen, PT

## 2023-03-13 ENCOUNTER — CLINICAL SUPPORT (OUTPATIENT)
Dept: REHABILITATION | Facility: HOSPITAL | Age: 43
End: 2023-03-13
Payer: COMMERCIAL

## 2023-03-13 DIAGNOSIS — M54.42 ACUTE LEFT-SIDED LOW BACK PAIN WITH LEFT-SIDED SCIATICA: Primary | ICD-10-CM

## 2023-03-13 PROCEDURE — 97110 THERAPEUTIC EXERCISES: CPT

## 2023-03-13 NOTE — PROGRESS NOTES
[facility]   Physical Therapy Treatment Note     Name: Humera Guerrero  Clinic Number: [id number]    Therapy Diagnosis:   Encounter Diagnosis   Name Primary?    Acute left-sided low back pain with left-sided sciatica Yes     Physician: Gay Locke FNP    Visit Date: 3/13/2023       Therapy Diagnosis: No diagnosis found.   Physician: Gay Locke FNP    Physician Orders: PT Eval and Treat  Medical Diagnosis from Referral: Acute midline low back pain with left-sided sc  Evaluation Date: 2/23/2023  Authorization Period Expiration: 12/14/2023  Plan of Care Expiration: 4/6/2023                              Visit # / Visits authorized: 4/ 15                  FOTO: Visit #1 - Scored : 1 / 3      PRECAUTIONS: Standard Precautions        PTA Visit #: 0/5     Time In: 4:45 PM  Time Out:  5:27  PM  Total Billable Time: 43 minutes    SUBJECTIVE     Pt reports: She is doing well, pain higher than usually she is knows that she did to much this weekend. She has increased pain with cooking activities after shopping at [employer]     He/She was compliant with home exercise program.  Response to previous treatment: Stretching is helping  Functional change: ongoing    Pain: 7/10  Location: left back  / Left LE    OBJECTIVE     Objective Measures updated at progress report unless specified.     Treatment     Time to Complete Exercises:     HUMERA received therapeutic exercises to develop strength, endurance, ROM, flexibility, and posture for  minutes including: x = exercises performed     TherEx 3/13/2023    LE Bike x 5 min   Calf Stretch x 3 x 20 sec   Hamstring Stretch x 3 x 20 sec   Piriformis Stretch  3 x 20 sec   Lumbar rotation bilateral  1 x 20   DKTC   X 30    Seated Lumbar stretch forward / lateral   5 x 5 sec hold   Cybex back extension   3 x 10 #3   Cybex Hip abduction  1 x 15   Cybex Knee Extension #2 x 3 x 10   Cybex Leg Press #6 x 3 x 10                    Plan for Next Visit:       JOAN received the following manual therapy techniques: Joint mobilizations, Manual traction, Myofacial release, Manual Lymphatic Drainage, Soft tissue Mobilization, and Friction Massage were applied to the:  for (0) minutes, including:    Manual Intervention Performed Today    Soft Tissue Mobilization   Lumbar Splay   Joint Mobilizations  LIAT luke Grades I-V   Mobilization with movement     Active release     Functional Dry Needling       Plan for Next Visit:      NEUROMUSCULAR RE-EDUCATION ACTIVITIES to improve Balance, Coordination, Kinesthetic, Sense, Proprioception, and Posture for 10 min .  The following were included:    Intervention Performed Today    Posterior Pelvic Tilt  X 10   Supine TrA Brace March  2 x 10   Brdige TrA Brace x 2 x 10   Open Book SL x 2 x 10   Standing thoracic extension with shoulder abduction  x 2 x 10   Ball Squats x 2 x 10   Thread the needle x    Palloff press x 3 x 10     Plan for Next Visit:         Electrical Muscle Stimulation Performed TIME Intensity (mA)  Details   Lumbar paraspinals pre mod  With MHP in prone using 6 towel layers x 15 6 volts Linear bracket formation with 2 leads               STEM SETTINGS:  Hz, 150-250 ms      PATIENT EDUCATION AND HOME EXERCISES     Home Exercises Provided and Patient Education Provided     Education provided:    Patient educated on biomechanical justification for therapeutic exercise and importance of compliance with HEP in order to improve overall impairments and QOL   Patient was educated on all the above exercise prior/during/after for proper posture, positioning, and execution for safe performance with home exercise program.       Written Home Exercises Provided: yes.  Exercises were reviewed and JOAN was able to demonstrate them prior to the end of the session.  JOAN demonstrated good  understanding of the education provided. See EMR under Patient Instructions for exercises provided during therapy  sessions.      ASSESSMENT     JOAN Guerrero tolerated PT session well with minimal  complaints of pain or discomfort. Ended session with pre-mod and MHP for pain in lower back.  Therapy exercises were reviewed by revisiting exercises given from previous home exercise program while adding no new interventions.   JOAN Is progressing well towards her goals.   Pt prognosis is Fair.     Pt will continue to benefit from skilled outpatient physical therapy to address the deficits listed in the problem list box on initial evaluation, provide pt/family education and to maximize pt's level of independence in the home and community environment.     Pt's spiritual, cultural and educational needs considered and pt agreeable to plan of care and goals.     Anticipated barriers to physical therapy: co-morbidities, sedentary lifestyle, and chronicity of condition    GOALS:  SHORT TERM GOALS: 3 weeks,  Progress   Recent signs and systems trend is improving in order to progress towards LTG's.     Patient will be independent with HEP in order to further progress and return to maximal function.     Pain rating at Worst: 5/10 in order to progress towards increased independence with activity.     Patient will be able to correct postural deviations in sitting and standing, to decrease pain and promote postural awareness for injury prevention.         LONG TERM GOALS: 6 weeks,  Progress   Patient will return to normal ADL, recreational, and work related activities with less pain and limitation.      Patient will improve AROM to stated goals in order to return to maximal functional potential.      Patient will improve Strength to stated goals of appropriate musculature in order to improve functional independence.      Pain Rating at Best: 1/10 to improve Quality of Life.      Patient will meet predicted functional outcome (FOTO) score: 5 points to increase self-worth & perceived functional ability.          PC =  progressing/continue  PM= partially met  M=met    PLAN     Continue Plan of Care (POC) and progress per patient tolerance. See Treatment section for exercise progression.    Merline Nguyen, PT

## 2023-03-15 ENCOUNTER — CLINICAL SUPPORT (OUTPATIENT)
Dept: REHABILITATION | Facility: HOSPITAL | Age: 43
End: 2023-03-15
Payer: COMMERCIAL

## 2023-03-15 DIAGNOSIS — M54.42 ACUTE LEFT-SIDED LOW BACK PAIN WITH LEFT-SIDED SCIATICA: Primary | ICD-10-CM

## 2023-03-15 PROCEDURE — 97014 ELECTRIC STIMULATION THERAPY: CPT

## 2023-03-15 PROCEDURE — 97110 THERAPEUTIC EXERCISES: CPT

## 2023-03-15 NOTE — PROGRESS NOTES
[facility]   Physical Therapy Treatment Note     Name: Humera Guerrero  Clinic Number: [id number]    Therapy Diagnosis:   Encounter Diagnosis   Name Primary?    Acute left-sided low back pain with left-sided sciatica Yes     Physician: Gay Locke FNP    Visit Date: 3/15/2023       Therapy Diagnosis: No diagnosis found.   Physician: Gay Locke FNP    Physician Orders: PT Eval and Treat  Medical Diagnosis from Referral: Acute midline low back pain with left-sided sc  Evaluation Date: 2/23/2023  Authorization Period Expiration: 12/14/2023  Plan of Care Expiration: 4/6/2023                              Visit # / Visits authorized: 4/ 15                  FOTO: Visit #1 - Scored : 1 / 3      PRECAUTIONS: Standard Precautions        PTA Visit #: 0/5     Time In: 4:43 PM  Time Out:  5:27 PM  Total Billable Time: 43 minutes    SUBJECTIVE     Pt reports: Leg press machine was too much last time, she is in about the same amount of pain as last time. Patient has left posterior pelvic pain.     He/She was compliant with home exercise program.  Response to previous treatment: Stretching is helping  Functional change: ongoing    Pain: 7/10  Location: left back  / Left LE    OBJECTIVE     Objective Measures updated at progress report unless specified.     Treatment     Time to Complete Exercises:     HUMERA received therapeutic exercises to develop strength, endurance, ROM, flexibility, and posture for  minutes including: x = exercises performed     TherEx 3/15/2023    LE Bike x 5 min   Calf Stretch x 3 x 20 sec   Hamstring Stretch x 3 x 20 sec   Piriformis Stretch  3 x 20 sec   Lumbar rotation bilateral  1 x 20   DKTC  x X 30    Seated Lumbar stretch forward / lateral   5 x 5 sec hold   Cybex back extension   3 x 10 #3   Cybex Hip abduction  1 x 15   Cybex Knee Extension #2 x 3 x 10   Cybex Leg Press #6 x 3 x 10                    Plan for Next Visit:      HUMERA received the  following manual therapy techniques: Joint mobilizations, Manual traction, Myofacial release, Manual Lymphatic Drainage, Soft tissue Mobilization, and Friction Massage were applied to the:  for (0) minutes, including:    Manual Intervention Performed Today    Soft Tissue Mobilization   Lumbar Splay   Joint Mobilizations  LIAT luke Grades I-V   Mobilization with movement     Active release     Functional Dry Needling       Plan for Next Visit:      NEUROMUSCULAR RE-EDUCATION ACTIVITIES to improve Balance, Coordination, Kinesthetic, Sense, Proprioception, and Posture for 10 min .  The following were included:    Intervention Performed Today    Posterior Pelvic Tilt  X 10   Supine TrA Brace March  2 x 10   Brdige TrA Brace x 2 x 10   Open Book SL x 2 x 10   Standing thoracic extension with shoulder abduction  x 2 x 10   Ball Squats  2 x 10   Thread the needle     Palloff press #1 x 3 x 10     Plan for Next Visit:         Electrical Muscle Stimulation Performed TIME Intensity (mA)  Details   Lumbar paraspinals pre mod  With MHP in prone using 6 towel layers x 15 6 volts Linear bracket formation with 2 leads               STEM SETTINGS:  Hz, 150-250 ms      PATIENT EDUCATION AND HOME EXERCISES     Home Exercises Provided and Patient Education Provided     Education provided:    Patient educated on biomechanical justification for therapeutic exercise and importance of compliance with HEP in order to improve overall impairments and QOL   Patient was educated on all the above exercise prior/during/after for proper posture, positioning, and execution for safe performance with home exercise program.       Written Home Exercises Provided: yes.  Exercises were reviewed and JOAN was able to demonstrate them prior to the end of the session.  JOAN demonstrated good  understanding of the education provided. See EMR under Patient Instructions for exercises provided during therapy sessions.      ASSESSMENT      JOAN Guerrero tolerated PT session well with minimal  complaints of pain or discomfort. Ended session with pre-mod and MHP for pain in lower back.  Therapy exercises were reviewed by revisiting exercises given from previous home exercise program while adding no new interventions.   JOAN Is progressing well towards her goals.   Pt prognosis is Fair.     Pt will continue to benefit from skilled outpatient physical therapy to address the deficits listed in the problem list box on initial evaluation, provide pt/family education and to maximize pt's level of independence in the home and community environment.     Pt's spiritual, cultural and educational needs considered and pt agreeable to plan of care and goals.     Anticipated barriers to physical therapy: co-morbidities, sedentary lifestyle, and chronicity of condition    GOALS:  SHORT TERM GOALS: 3 weeks,  Progress   Recent signs and systems trend is improving in order to progress towards LTG's.     Patient will be independent with HEP in order to further progress and return to maximal function.     Pain rating at Worst: 5/10 in order to progress towards increased independence with activity.     Patient will be able to correct postural deviations in sitting and standing, to decrease pain and promote postural awareness for injury prevention.         LONG TERM GOALS: 6 weeks,  Progress   Patient will return to normal ADL, recreational, and work related activities with less pain and limitation.      Patient will improve AROM to stated goals in order to return to maximal functional potential.      Patient will improve Strength to stated goals of appropriate musculature in order to improve functional independence.      Pain Rating at Best: 1/10 to improve Quality of Life.      Patient will meet predicted functional outcome (FOTO) score: 5 points to increase self-worth & perceived functional ability.          PC = progressing/continue  PM= partially  met  M=met    PLAN     Continue Plan of Care (POC) and progress per patient tolerance. See Treatment section for exercise progression.    Merline Nguyen, PT

## 2023-03-20 ENCOUNTER — CLINICAL SUPPORT (OUTPATIENT)
Dept: REHABILITATION | Facility: HOSPITAL | Age: 43
End: 2023-03-20
Payer: COMMERCIAL

## 2023-03-20 DIAGNOSIS — M54.42 ACUTE LEFT-SIDED LOW BACK PAIN WITH LEFT-SIDED SCIATICA: Primary | ICD-10-CM

## 2023-03-20 PROCEDURE — 97110 THERAPEUTIC EXERCISES: CPT

## 2023-03-20 PROCEDURE — 97112 NEUROMUSCULAR REEDUCATION: CPT

## 2023-03-20 NOTE — PROGRESS NOTES
[facility]   Physical Therapy Treatment Note     Name: Humera Guerrero  Clinic Number: [id number]    Therapy Diagnosis:   Encounter Diagnosis   Name Primary?    Acute left-sided low back pain with left-sided sciatica Yes     Physician: Gay Locke FNP    Visit Date: 3/20/2023       Therapy Diagnosis: No diagnosis found.   Physician: Gay Locke FNP    Physician Orders: PT Eval and Treat  Medical Diagnosis from Referral: Acute midline low back pain with left-sided sc  Evaluation Date: 2/23/2023  Authorization Period Expiration: 12/14/2023  Plan of Care Expiration: 4/6/2023                              Visit # / Visits authorized: 8/ 15                  FOTO: Visit #1 - Scored : 1 / 3      PRECAUTIONS: Standard Precautions        PTA Visit #: 0/5     Time In: 3:17 PM  Time Out: 4:00 PM  Total Billable Time: 43 minutes    SUBJECTIVE     Pt reports: able to perform cooking up to 15-20 min before back straining.      He/She was compliant with home exercise program.  Response to previous treatment: Stretching is helping  Functional change: ongoing    Pain: 7/10    Location: left back  / Left LE    OBJECTIVE     Objective Measures updated at progress report unless specified.     Treatment     Time to Complete Exercises:     HUMERA received therapeutic exercises to develop strength, endurance, ROM, flexibility, and posture for  minutes including: x = exercises performed     TherEx 3/20/2023    LE Bike x 5 min   Calf Stretch x 3 x 20 sec   Hamstring Stretch x 3 x 20 sec   Piriformis Stretch  3 x 20 sec   Lumbar rotation bilateral  1 x 20   DKTC   X 30    Seated Lumbar stretch forward / lateral   5 x 5 sec hold   Cybex back extension  x 3 x 10 #4   Cybex Hip abduction  1 x 15   Cybex Knee Extension #2  3 x 10   Cybex Leg Press #6  3 x 10                    Plan for Next Visit:      HUMERA received the following manual therapy techniques: Joint mobilizations, Manual traction,  Myofacial release, Manual Lymphatic Drainage, Soft tissue Mobilization, and Friction Massage were applied to the:  for (0) minutes, including:    Manual Intervention Performed Today    Soft Tissue Mobilization   Lumbar Splay   Joint Mobilizations  LIAT luke Grades I-V   Mobilization with movement     Active release     Functional Dry Needling       Plan for Next Visit:      NEUROMUSCULAR RE-EDUCATION ACTIVITIES to improve Balance, Coordination, Kinesthetic, Sense, Proprioception, and Posture for 10 min .  The following were included:    Intervention Performed Today    Posterior Pelvic Tilt  X 10   Supine TrA Brace March  2 x 10   Brdige TrA Brace  2 x 10   Open Book SL  2 x 10   Standing thoracic extension with shoulder abduction   2 x 10   Ball Squats  2 x 10   Thread the needle     Palloff press #1 x 3 x 10   Dead Bug with ball  x X 20   Psoaos March x X 20   Crunch x X 10     Plan for Next Visit:         Electrical Muscle Stimulation Performed TIME Intensity (mA)  Details   Lumbar paraspinals pre mod  With MHP in prone using 6 towel layers x 15 6 volts Linear bracket formation with 2 leads               STEM SETTINGS:  Hz, 150-250 ms      PATIENT EDUCATION AND HOME EXERCISES     Home Exercises Provided and Patient Education Provided     Education provided:    Patient educated on biomechanical justification for therapeutic exercise and importance of compliance with HEP in order to improve overall impairments and QOL   Patient was educated on all the above exercise prior/during/after for proper posture, positioning, and execution for safe performance with home exercise program.     Written Home Exercises Provided: yes.  Exercises were reviewed and JOAN was able to demonstrate them prior to the end of the session.  JOAN demonstrated good  understanding of the education provided. See EMR under Patient Instructions for exercises provided during therapy sessions.      ASSESSMENT     JOAN Guerrero  tolerated PT session well with minimal  complaints of pain or discomfort. Patient needed verbal and tactile cues for advanced core activation interventions; improvements made with mass practice.  Therapy exercises were reviewed by revisiting exercises given from previous home exercise program while adding no new interventions.     JOAN Is progressing well towards her goals.   Pt prognosis is Fair.     Pt will continue to benefit from skilled outpatient physical therapy to address the deficits listed in the problem list box on initial evaluation, provide pt/family education and to maximize pt's level of independence in the home and community environment.     Pt's spiritual, cultural and educational needs considered and pt agreeable to plan of care and goals.     Anticipated barriers to physical therapy: co-morbidities, sedentary lifestyle, and chronicity of condition    GOALS:  SHORT TERM GOALS: 3 weeks,  Progress   Recent signs and systems trend is improving in order to progress towards LTG's.     Patient will be independent with HEP in order to further progress and return to maximal function.     Pain rating at Worst: 5/10 in order to progress towards increased independence with activity.     Patient will be able to correct postural deviations in sitting and standing, to decrease pain and promote postural awareness for injury prevention.         LONG TERM GOALS: 6 weeks,  Progress   Patient will return to normal ADL, recreational, and work related activities with less pain and limitation.      Patient will improve AROM to stated goals in order to return to maximal functional potential.      Patient will improve Strength to stated goals of appropriate musculature in order to improve functional independence.      Pain Rating at Best: 1/10 to improve Quality of Life.      Patient will meet predicted functional outcome (FOTO) score: 5 points to increase self-worth & perceived functional ability.          PC =  progressing/continue  PM= partially met  M=met    PLAN     Continue Plan of Care (POC) and progress per patient tolerance. See Treatment section for exercise progression.    Merline Nguyen, PT

## 2023-03-22 ENCOUNTER — CLINICAL SUPPORT (OUTPATIENT)
Dept: REHABILITATION | Facility: HOSPITAL | Age: 43
End: 2023-03-22
Payer: COMMERCIAL

## 2023-03-22 DIAGNOSIS — M54.42 ACUTE LEFT-SIDED LOW BACK PAIN WITH LEFT-SIDED SCIATICA: Primary | ICD-10-CM

## 2023-03-22 PROCEDURE — 97112 NEUROMUSCULAR REEDUCATION: CPT | Mod: CQ

## 2023-03-22 PROCEDURE — 97014 ELECTRIC STIMULATION THERAPY: CPT | Mod: CQ

## 2023-03-22 PROCEDURE — 97110 THERAPEUTIC EXERCISES: CPT | Mod: CQ

## 2023-03-22 NOTE — PROGRESS NOTES
[facility]   Physical Therapy Treatment Note     Name: Humera Guerrero  Clinic Number: [id number]    Therapy Diagnosis:   Encounter Diagnosis   Name Primary?    Acute left-sided low back pain with left-sided sciatica Yes     Physician: Gay Locke FNP    Visit Date: 3/22/2023       Therapy Diagnosis: No diagnosis found.   Physician: Gay Locke FNP    Physician Orders: PT Eval and Treat  Medical Diagnosis from Referral: Acute midline low back pain with left-sided sc  Evaluation Date: 2/23/2023  Authorization Period Expiration: 12/14/2023  Plan of Care Expiration: 4/6/2023                              Visit # / Visits authorized: 9/ 15                  FOTO: Visit #1 - Scored : 1 / 3      PRECAUTIONS: Standard Precautions        PTA Visit #: 1/5     Time In: 3:15 PM  Time Out: 4:00 PM  Total Billable Time: 45 minutes    SUBJECTIVE     Pt reports: Pain is better but is still having burning.     He/She was compliant with home exercise program.  Response to previous treatment: Stretching is helping  Functional change: ongoing    Pain: 5/10    Location: left back  / Left LE    OBJECTIVE     Objective Measures updated at progress report unless specified.     Treatment     Time to Complete Exercises:     HUMERA received therapeutic exercises to develop strength, endurance, ROM, flexibility, and posture for  minutes including: x = exercises performed     TherEx 3/22/2023    LE Bike x 5 min   Calf Stretch x 3 x 20 sec   Hamstring Stretch x 3 x 20 sec   Piriformis Stretch  3 x 20 sec   Lumbar rotation bilateral  1 x 20   DKTC   X 30    Seated Lumbar stretch forward / lateral   5 x 5 sec hold   Cybex back extension  x 3 x 10 #4   Cybex Hip abduction  1 x 15   Cybex Knee Extension #2  3 x 10   Cybex Leg Press #6  3 x 10                    Plan for Next Visit:      HUMERA received the following manual therapy techniques: Joint mobilizations, Manual traction, Myofacial release,  Manual Lymphatic Drainage, Soft tissue Mobilization, and Friction Massage were applied to the:  for (0) minutes, including:    Manual Intervention Performed Today    Soft Tissue Mobilization   Lumbar Splay   Joint Mobilizations  LIAT luke Grades I-V   Mobilization with movement     Active release     Functional Dry Needling       Plan for Next Visit:      NEUROMUSCULAR RE-EDUCATION ACTIVITIES to improve Balance, Coordination, Kinesthetic, Sense, Proprioception, and Posture for 10 min .  The following were included:    Intervention Performed Today    Posterior Pelvic Tilt  X 10   Supine TrA Brace March  2 x 10   Brdige TrA Brace  2 x 10   Open Book SL  2 x 10   Standing thoracic extension with shoulder abduction   2 x 10   Ball Squats  2 x 10   Thread the needle     Palloff press #1 x 3 x 10   Dead Bug with ball  x X 20   Psoaos March x X 20   Crunch x X 10     Plan for Next Visit:         Electrical Muscle Stimulation Performed TIME Intensity (mA)  Details   Lumbar paraspinals pre mod  With MHP in prone using 6 towel layers x 15 6 volts Linear bracket formation with 2 leads               STEM SETTINGS:  Hz, 150-250 ms      PATIENT EDUCATION AND HOME EXERCISES     Home Exercises Provided and Patient Education Provided     Education provided:    Patient educated on biomechanical justification for therapeutic exercise and importance of compliance with HEP in order to improve overall impairments and QOL   Patient was educated on all the above exercise prior/during/after for proper posture, positioning, and execution for safe performance with home exercise program.     Written Home Exercises Provided: yes.  Exercises were reviewed and JOAN was able to demonstrate them prior to the end of the session.  JOAN demonstrated good  understanding of the education provided. See EMR under Patient Instructions for exercises provided during therapy sessions.      ASSESSMENT     JOAN Guerrero tolerated PT session  well with minimal  complaints of pain or discomfort. Patient needed verbal and tactile cues for advanced core activation interventions; improvements made with mass practice.  Therapy exercises were reviewed by revisiting exercises given from previous home exercise program while adding no new interventions. Ended session with pre-mod and moist heat pack for pain control.     JOAN Is progressing well towards her goals.   Pt prognosis is Fair.     Pt will continue to benefit from skilled outpatient physical therapy to address the deficits listed in the problem list box on initial evaluation, provide pt/family education and to maximize pt's level of independence in the home and community environment.     Pt's spiritual, cultural and educational needs considered and pt agreeable to plan of care and goals.     Anticipated barriers to physical therapy: co-morbidities, sedentary lifestyle, and chronicity of condition    GOALS:  SHORT TERM GOALS: 3 weeks,  Progress   Recent signs and systems trend is improving in order to progress towards LTG's.     Patient will be independent with HEP in order to further progress and return to maximal function.     Pain rating at Worst: 5/10 in order to progress towards increased independence with activity.     Patient will be able to correct postural deviations in sitting and standing, to decrease pain and promote postural awareness for injury prevention.         LONG TERM GOALS: 6 weeks,  Progress   Patient will return to normal ADL, recreational, and work related activities with less pain and limitation.      Patient will improve AROM to stated goals in order to return to maximal functional potential.      Patient will improve Strength to stated goals of appropriate musculature in order to improve functional independence.      Pain Rating at Best: 1/10 to improve Quality of Life.      Patient will meet predicted functional outcome (FOTO) score: 5 points to increase self-worth &  perceived functional ability.          PC = progressing/continue  PM= partially met  M=met    PLAN     Continue Plan of Care (POC) and progress per patient tolerance. See Treatment section for exercise progression.    Madelaine Mobley, PTA

## 2023-03-27 ENCOUNTER — CLINICAL SUPPORT (OUTPATIENT)
Dept: REHABILITATION | Facility: HOSPITAL | Age: 43
End: 2023-03-27
Payer: COMMERCIAL

## 2023-03-27 ENCOUNTER — OFFICE VISIT (OUTPATIENT)
Dept: PRIMARY CARE CLINIC | Facility: CLINIC | Age: 43
End: 2023-03-27
Payer: COMMERCIAL

## 2023-03-27 VITALS
SYSTOLIC BLOOD PRESSURE: 150 MMHG | HEART RATE: 80 BPM | RESPIRATION RATE: 18 BRPM | WEIGHT: 267 LBS | TEMPERATURE: 98 F | OXYGEN SATURATION: 99 % | DIASTOLIC BLOOD PRESSURE: 76 MMHG | HEIGHT: 66 IN | BODY MASS INDEX: 42.91 KG/M2

## 2023-03-27 DIAGNOSIS — M54.42 ACUTE LEFT-SIDED LOW BACK PAIN WITH LEFT-SIDED SCIATICA: Primary | ICD-10-CM

## 2023-03-27 DIAGNOSIS — M54.9 DORSALGIA, UNSPECIFIED: Primary | ICD-10-CM
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
## 2023-03-27 DIAGNOSIS — G89.29 CHRONIC LEFT-SIDED LOW BACK PAIN WITH LEFT-SIDED SCIATICA: ICD-10-CM

## 2023-03-27 DIAGNOSIS — M54.42 CHRONIC LEFT-SIDED LOW BACK PAIN WITH LEFT-SIDED SCIATICA: ICD-10-CM

## 2023-03-27 PROCEDURE — 99213 PR OFFICE/OUTPT VISIT, EST, LEVL III, 20-29 MIN: ICD-10-PCS | Mod: ,,, | Performed by: NURSE PRACTITIONER

## 2023-03-27 PROCEDURE — 97112 NEUROMUSCULAR REEDUCATION: CPT | Mod: CQ

## 2023-03-27 PROCEDURE — 97110 THERAPEUTIC EXERCISES: CPT | Mod: CQ

## 2023-03-27 PROCEDURE — 99213 OFFICE O/P EST LOW 20 MIN: CPT | Mod: ,,, | Performed by: NURSE PRACTITIONER

## 2023-03-27 PROCEDURE — 97014 ELECTRIC STIMULATION THERAPY: CPT | Mod: CQ

## 2023-03-27 RX ORDER — GABAPENTIN 100 MG/1
100 CAPSULE ORAL 3 TIMES DAILY
Qty: 90 CAPSULE | Refills: 1 | Status: SHIPPED | OUTPATIENT
Start: 2023-03-27 | End: 2023-11-02

## 2023-03-27 RX ORDER — IBUPROFEN 800 MG/1
800 TABLET ORAL EVERY 6 HOURS PRN
Qty: 20 TABLET | Refills: 0 | Status: SHIPPED | OUTPATIENT
Start: 2023-03-27 | End: 2023-11-02

## 2023-03-27 NOTE — PROGRESS NOTES
[facility]   Physical Therapy Treatment Note     Name: Humera Guerrero  Clinic Number: [id number]    Therapy Diagnosis:   Encounter Diagnosis   Name Primary?    Acute left-sided low back pain with left-sided sciatica Yes     Physician: Gya Locke FNP    Visit Date: 3/27/2023       Therapy Diagnosis: No diagnosis found.   Physician: Gay Locke FNP    Physician Orders: PT Eval and Treat  Medical Diagnosis from Referral: Acute midline low back pain with left-sided sc  Evaluation Date: 2/23/2023  Authorization Period Expiration: 12/14/2023  Plan of Care Expiration: 4/6/2023                              Visit # / Visits authorized: 10/ 15                  FOTO: Visit #1 - Scored : 1 / 3      PRECAUTIONS: Standard Precautions        PTA Visit #: 2/5     Time In: 3:50 PM  Time Out: 4:45 PM  Total Billable Time: 55 minutes    SUBJECTIVE     Pt reports: That she was out of her medicine for 2 weeks and that her pain came back.    He/She was compliant with home exercise program.  Response to previous treatment: Stretching is helping  Functional change: ongoing    Pain: 9/10    Location: left back  / Left LE    OBJECTIVE     Objective Measures updated at progress report unless specified.     Treatment     Time to Complete Exercises:     HUMERA received therapeutic exercises to develop strength, endurance, ROM, flexibility, and posture for  minutes including: x = exercises performed     TherEx 3/27/2023    LE Bike x 5 min   Calf Stretch x 3 x 20 sec   Hamstring Stretch x 3 x 20 sec   Piriformis Stretch X 4 x 15 sec   Ball roll out  x 5 x 5 seconds    Cybex back extension  x 3 x 10                                              Plan for Next Visit:      HUMERA received the following manual therapy techniques: Joint mobilizations, Manual traction, Myofacial release, Manual Lymphatic Drainage, Soft tissue Mobilization, and Friction Massage were applied to the:  for (0) minutes,  including:    Manual Intervention Performed Today    Soft Tissue Mobilization   Lumbar Splay   Joint Mobilizations  LIAT luke Grades I-V   Mobilization with movement     Active release     Functional Dry Needling       Plan for Next Visit:      NEUROMUSCULAR RE-EDUCATION ACTIVITIES to improve Balance, Coordination, Kinesthetic, Sense, Proprioception, and Posture for 10 min .  The following were included:    Intervention Performed Today    Posterior Pelvic Tilt  X 10   Supine TrA Brace March  2 x 10   Brdige TrA Brace  2 x 10   Open Book SL  2 x 10   Standing thoracic extension with shoulder abduction   2 x 10   Ball Squats  2 x 10   Thread the needle     Palloff press #1 x 3 x 10   Dead Bug with ball  x X 20   Psoaos March x X 20   Crunch  X 10     Plan for Next Visit:         Electrical Muscle Stimulation Performed TIME Intensity (mA)  Details   Lumbar paraspinals pre mod  With MHP in prone using 6 towel layers x 15 6 volts Linear bracket formation with 2 leads               STEM SETTINGS:  Hz, 150-250 ms      PATIENT EDUCATION AND HOME EXERCISES     Home Exercises Provided and Patient Education Provided     Education provided:    Patient educated on biomechanical justification for therapeutic exercise and importance of compliance with HEP in order to improve overall impairments and QOL   Patient was educated on all the above exercise prior/during/after for proper posture, positioning, and execution for safe performance with home exercise program.     Written Home Exercises Provided: yes.  Exercises were reviewed and JOAN was able to demonstrate them prior to the end of the session.  JOAN demonstrated good  understanding of the education provided. See EMR under Patient Instructions for exercises provided during therapy sessions.      ASSESSMENT     JOAN Guerrero tolerated PT session well with minimal  complaints of pain or discomfort. Patient needed verbal and tactile cues for advanced core  activation interventions; improvements made with mass practice.  Therapy exercises were reviewed by revisiting exercises given from previous home exercise program while adding no new interventions. Patient states that she was off of her medications and that her pain has came back. Ended session with pre-mod and moist heat pack for pain control.     JOAN Is progressing well towards her goals.   Pt prognosis is Fair.     Pt will continue to benefit from skilled outpatient physical therapy to address the deficits listed in the problem list box on initial evaluation, provide pt/family education and to maximize pt's level of independence in the home and community environment.     Pt's spiritual, cultural and educational needs considered and pt agreeable to plan of care and goals.     Anticipated barriers to physical therapy: co-morbidities, sedentary lifestyle, and chronicity of condition    GOALS:  SHORT TERM GOALS: 3 weeks,  Progress   Recent signs and systems trend is improving in order to progress towards LTG's.     Patient will be independent with HEP in order to further progress and return to maximal function.     Pain rating at Worst: 5/10 in order to progress towards increased independence with activity.     Patient will be able to correct postural deviations in sitting and standing, to decrease pain and promote postural awareness for injury prevention.         LONG TERM GOALS: 6 weeks,  Progress   Patient will return to normal ADL, recreational, and work related activities with less pain and limitation.      Patient will improve AROM to stated goals in order to return to maximal functional potential.      Patient will improve Strength to stated goals of appropriate musculature in order to improve functional independence.      Pain Rating at Best: 1/10 to improve Quality of Life.      Patient will meet predicted functional outcome (FOTO) score: 5 points to increase self-worth & perceived functional ability.           PC = progressing/continue  PM= partially met  M=met    PLAN     Continue Plan of Care (POC) and progress per patient tolerance. See Treatment section for exercise progression.    Madelaine Mobley, PTA
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
## 2023-03-29 ENCOUNTER — CLINICAL SUPPORT (OUTPATIENT)
Dept: REHABILITATION | Facility: HOSPITAL | Age: 43
End: 2023-03-29
Payer: COMMERCIAL

## 2023-03-29 DIAGNOSIS — M54.42 ACUTE LEFT-SIDED LOW BACK PAIN WITH LEFT-SIDED SCIATICA: Primary | ICD-10-CM

## 2023-03-29 PROCEDURE — 97110 THERAPEUTIC EXERCISES: CPT

## 2023-03-29 PROCEDURE — 97014 ELECTRIC STIMULATION THERAPY: CPT

## 2023-03-29 NOTE — PROGRESS NOTES
[facility]   Physical Therapy Treatment Note     Name: Humera Guerrero  Clinic Number: [id number]    Therapy Diagnosis:   Encounter Diagnosis   Name Primary?    Acute left-sided low back pain with left-sided sciatica Yes     Physician: Gay Locke FNP    Visit Date: 3/29/2023       Therapy Diagnosis: No diagnosis found.   Physician: Gay Locke FNP    Physician Orders: PT Eval and Treat  Medical Diagnosis from Referral: Acute midline low back pain with left-sided sc  Evaluation Date: 2/23/2023  Authorization Period Expiration: 12/14/2023  Plan of Care Expiration: 4/6/2023                              Visit # / Visits authorized: 10/ 15                  FOTO: Visit #1 - Scored : 1 / 3      PRECAUTIONS: Standard Precautions        PTA Visit #: 2/5     Time In: 3:50 PM  Time Out: 4:30 PM  Total Billable Time: 40 minutes    SUBJECTIVE     Pt reports: She is more pain today, she has a refill on her pain medication and she has an MRI scheduled and a follow up with her FNP. Since not on pain medication pain feels like pins and needles. Patient states when she strains with bowel movement she has back pain.     He/She was compliant with home exercise program.    Response to previous treatment: Stretching is helping    Functional change: ongoing    Pain: 9/10    Location: left back  / Left LE    OBJECTIVE     Objective Measures updated at progress report unless specified.     Treatment     Time to Complete Exercises:     HUMERA received therapeutic exercises to develop strength, endurance, ROM, flexibility, and posture for  minutes including: x = exercises performed     TherEx 3/29/2023    LE Bike x 6 min   Calf Stretch x 3 x 20 sec   Hamstring Stretch x 3 x 20 sec   Piriformis Stretch X 4 x 15 sec   Ball roll out  x 5 x 5 seconds    Cybex back extension  x 3 x 10                                              Plan for Next Visit:      HUMERA received the following manual  therapy techniques: Joint mobilizations, Manual traction, Myofacial release, Manual Lymphatic Drainage, Soft tissue Mobilization, and Friction Massage were applied to the:  for (0) minutes, including:    Manual Intervention Performed Today    Soft Tissue Mobilization   Lumbar Splay   Joint Mobilizations  LIAT luke Grades I-V   Mobilization with movement     Active release     Functional Dry Needling       Plan for Next Visit:      NEUROMUSCULAR RE-EDUCATION ACTIVITIES to improve Balance, Coordination, Kinesthetic, Sense, Proprioception, and Posture for 10 min .  The following were included:    Intervention Performed Today    Posterior Pelvic Tilt  X 10   Supine TrA Brace March  2 x 10   Brdige TrA Brace x 2 x 10   Open Book SL  2 x 10   Standing thoracic extension with shoulder abduction   2 x 10   Ball Squats  2 x 10   Thread the needle     Palloff press #1 x 3 x 10   Dead Bug with ball  x X 20   Psoaos March x X 20   SLR  x 2 x 10 Bilateral     Plan for Next Visit:         Electrical Muscle Stimulation Performed TIME Intensity (mA)  Details   Lumbar paraspinals pre mod  With MHP in prone using 6 towel layers x 15 6 volts Linear bracket formation with 2 leads               STEM SETTINGS:  Hz, 150-250 ms      PATIENT EDUCATION AND HOME EXERCISES     Home Exercises Provided and Patient Education Provided     Education provided:    Patient educated on biomechanical justification for therapeutic exercise and importance of compliance with HEP in order to improve overall impairments and QOL   Patient was educated on all the above exercise prior/during/after for proper posture, positioning, and execution for safe performance with home exercise program.     Written Home Exercises Provided: yes.  Exercises were reviewed and JOAN was able to demonstrate them prior to the end of the session.  JOAN demonstrated good  understanding of the education provided. See EMR under Patient Instructions for exercises  provided during therapy sessions.      ASSESSMENT     JOAN Guerrero tolerated PT session well with minimal  complaints of pain or discomfort. Patient needed verbal and tactile cues for advanced core activation interventions; improvements made with mass practice.  Therapy exercises were reviewed by revisiting exercises given from previous home exercise program while adding no new interventions. Patient states that she was off of her medications and that her pain has came back. Ended session with pre-mod and moist heat pack for pain control.     JOAN Is progressing well towards her goals.   Pt prognosis is Fair.     Pt will continue to benefit from skilled outpatient physical therapy to address the deficits listed in the problem list box on initial evaluation, provide pt/family education and to maximize pt's level of independence in the home and community environment.     Pt's spiritual, cultural and educational needs considered and pt agreeable to plan of care and goals.     Anticipated barriers to physical therapy: co-morbidities, sedentary lifestyle, and chronicity of condition    GOALS:  SHORT TERM GOALS: 3 weeks,  Progress   Recent signs and systems trend is improving in order to progress towards LTG's.     Patient will be independent with HEP in order to further progress and return to maximal function.     Pain rating at Worst: 5/10 in order to progress towards increased independence with activity.     Patient will be able to correct postural deviations in sitting and standing, to decrease pain and promote postural awareness for injury prevention.         LONG TERM GOALS: 6 weeks,  Progress   Patient will return to normal ADL, recreational, and work related activities with less pain and limitation.      Patient will improve AROM to stated goals in order to return to maximal functional potential.      Patient will improve Strength to stated goals of appropriate musculature in order to improve functional  independence.      Pain Rating at Best: 1/10 to improve Quality of Life.      Patient will meet predicted functional outcome (FOTO) score: 5 points to increase self-worth & perceived functional ability.          PC = progressing/continue  PM= partially met  M=met    PLAN     Continue Plan of Care (POC) and progress per patient tolerance. See Treatment section for exercise progression.    Merline Nguyen, PT

## 2023-04-03 ENCOUNTER — CLINICAL SUPPORT (OUTPATIENT)
Dept: REHABILITATION | Facility: HOSPITAL | Age: 43
End: 2023-04-03
Payer: COMMERCIAL

## 2023-04-03 DIAGNOSIS — M54.42 ACUTE LEFT-SIDED LOW BACK PAIN WITH LEFT-SIDED SCIATICA: Primary | ICD-10-CM

## 2023-04-03 PROCEDURE — 97014 ELECTRIC STIMULATION THERAPY: CPT

## 2023-04-03 PROCEDURE — 97110 THERAPEUTIC EXERCISES: CPT

## 2023-04-03 NOTE — PROGRESS NOTES
[facility]   Physical Therapy Treatment Note     Name: Humera Guerrero  Clinic Number: [id number]    Therapy Diagnosis:   Encounter Diagnosis   Name Primary?    Acute left-sided low back pain with left-sided sciatica Yes     Physician: Gay Locke FNP    Visit Date: 4/3/2023       Therapy Diagnosis: No diagnosis found.   Physician: Gay Locke FNP    Physician Orders: PT Eval and Treat  Medical Diagnosis from Referral: Acute midline low back pain with left-sided sc  Evaluation Date: 2/23/2023  Authorization Period Expiration: 12/14/2023  Plan of Care Expiration: 4/6/2023                              Visit # / Visits authorized: 12/ 15                  FOTO: Visit #1 - Scored : 1 / 3      PRECAUTIONS: Standard Precautions        PTA Visit #: 2/5     Time In: 3:50 PM  Time Out: 4:30 PM  Total Billable Time: 40 minutes    SUBJECTIVE     Pt reports: She has taken a tylenol because of her pain, she had bought a grabber device to  things off the ground due to pain with bending.      He/She was compliant with home exercise program.    Response to previous treatment: Stretching is helping    Functional change: ongoing    Pain: 8/10    Location: left back  / Left LE    OBJECTIVE     Objective Measures updated at progress report unless specified.     Treatment     Time to Complete Exercises:     HUMERA received therapeutic exercises to develop strength, endurance, ROM, flexibility, and posture for  minutes including: x = exercises performed     TherEx 4/3/2023    LE Bike x 6 min   Calf Stretch x 3 x 20 sec   Hamstring Stretch x 3 x 20 sec   Piriformis Stretch x 3 x 15 sec   Ball roll out  x 5 x 5 seconds    Cybex back extension   3 x 10                                              Plan for Next Visit:      HUMERA received the following manual therapy techniques: Joint mobilizations, Manual traction, Myofacial release, Manual Lymphatic Drainage, Soft tissue  Mobilization, and Friction Massage were applied to the:  for (0) minutes, including:    Manual Intervention Performed Today    Soft Tissue Mobilization   Lumbar Splay   Joint Mobilizations  LIAT luke Grades I-V   Mobilization with movement     Active release     Functional Dry Needling       Plan for Next Visit:      NEUROMUSCULAR RE-EDUCATION ACTIVITIES to improve Balance, Coordination, Kinesthetic, Sense, Proprioception, and Posture for 10 min .  The following were included:    Intervention Performed Today    Posterior Pelvic Tilt  X 10   Supine TrA Brace March  2 x 10   Brdige TrA Brace x 2 x 10   Open Book SL  2 x 10   Standing thoracic extension with shoulder abduction   2 x 10   Ball Squats  2 x 10   Thread the needle     Palloff press #1  3 x 10   Dead Bug with ball   X 20   Psoaos March  X 20   SLR  x 2 x 10 Bilateral     Plan for Next Visit:         Electrical Muscle Stimulation Performed TIME Intensity (mA)  Details   Lumbar paraspinals pre mod  With MHP in prone using 6 towel layers x 15 6 volts Linear bracket formation with 2 leads               STEM SETTINGS:  Hz, 150-250 ms      PATIENT EDUCATION AND HOME EXERCISES     Home Exercises Provided and Patient Education Provided     Education provided:    Patient educated on biomechanical justification for therapeutic exercise and importance of compliance with HEP in order to improve overall impairments and QOL   Patient was educated on all the above exercise prior/during/after for proper posture, positioning, and execution for safe performance with home exercise program.     Written Home Exercises Provided: yes.  Exercises were reviewed and JOAN was able to demonstrate them prior to the end of the session.  JOAN demonstrated good  understanding of the education provided. See EMR under Patient Instructions for exercises provided during therapy sessions.      ASSESSMENT     JOAN Guerrero tolerated PT session well with minimal  complaints of  pain or discomfort. Patient needed verbal and tactile cues for advanced core activation interventions; improvements made with mass practice.  Therapy exercises were reviewed by revisiting exercises given from previous home exercise program while adding no new interventions. Patient states that she was off of her medications and that her pain has came back. Ended session with pre-mod and moist heat pack for pain control.     JOAN Is progressing well towards her goals.   Pt prognosis is Fair.     Pt will continue to benefit from skilled outpatient physical therapy to address the deficits listed in the problem list box on initial evaluation, provide pt/family education and to maximize pt's level of independence in the home and community environment.     Pt's spiritual, cultural and educational needs considered and pt agreeable to plan of care and goals.     Anticipated barriers to physical therapy: co-morbidities, sedentary lifestyle, and chronicity of condition    GOALS:  SHORT TERM GOALS: 3 weeks,  Progress   Recent signs and systems trend is improving in order to progress towards LTG's.     Patient will be independent with HEP in order to further progress and return to maximal function.     Pain rating at Worst: 5/10 in order to progress towards increased independence with activity.     Patient will be able to correct postural deviations in sitting and standing, to decrease pain and promote postural awareness for injury prevention.         LONG TERM GOALS: 6 weeks,  Progress   Patient will return to normal ADL, recreational, and work related activities with less pain and limitation.      Patient will improve AROM to stated goals in order to return to maximal functional potential.      Patient will improve Strength to stated goals of appropriate musculature in order to improve functional independence.      Pain Rating at Best: 1/10 to improve Quality of Life.      Patient will meet predicted functional outcome  (FOTO) score: 5 points to increase self-worth & perceived functional ability.          PC = progressing/continue  PM= partially met  M=met    PLAN     Continue Plan of Care (POC) and progress per patient tolerance. See Treatment section for exercise progression.    Merline Nguyen, PT

## 2023-04-05 ENCOUNTER — CLINICAL SUPPORT (OUTPATIENT)
Dept: REHABILITATION | Facility: HOSPITAL | Age: 43
End: 2023-04-05
Payer: COMMERCIAL

## 2023-04-05 DIAGNOSIS — M54.42 ACUTE LEFT-SIDED LOW BACK PAIN WITH LEFT-SIDED SCIATICA: Primary | ICD-10-CM

## 2023-04-05 PROCEDURE — 97014 ELECTRIC STIMULATION THERAPY: CPT | Mod: CQ

## 2023-04-05 PROCEDURE — 97112 NEUROMUSCULAR REEDUCATION: CPT | Mod: CQ

## 2023-04-05 PROCEDURE — 97110 THERAPEUTIC EXERCISES: CPT | Mod: CQ

## 2023-04-05 NOTE — PROGRESS NOTES
[facility]   Physical Therapy Treatment Note     Name: Humera Guerrero  Clinic Number: [id number]    Therapy Diagnosis:   Encounter Diagnosis   Name Primary?    Acute left-sided low back pain with left-sided sciatica Yes     Physician: Gay Locke FNP    Visit Date: 4/5/2023       Therapy Diagnosis: No diagnosis found.   Physician: Gay Locke FNP    Physician Orders: PT Eval and Treat  Medical Diagnosis from Referral: Acute midline low back pain with left-sided sc  Evaluation Date: 2/23/2023  Authorization Period Expiration: 12/14/2023  Plan of Care Expiration: 4/6/2023                              Visit # / Visits authorized: 13/ 15                  FOTO: Visit #1 - Scored : 1 / 3      PRECAUTIONS: Standard Precautions        PTA Visit #: 1/5     Time In: 3:15 PM  Time Out: 4:00 PM  Total Billable Time: 45 minutes    SUBJECTIVE     Pt reports: Pain is increased today.    He/She was compliant with home exercise program.    Response to previous treatment: Stretching is helping    Functional change: ongoing    Pain: 10/10    Location: left back  / Left LE    OBJECTIVE     Objective Measures updated at progress report unless specified.     Treatment     Time to Complete Exercises:     HUMERA received therapeutic exercises to develop strength, endurance, ROM, flexibility, and posture for  minutes including: x = exercises performed     TherEx 4/5/2023    LE Bike x 6 min   Calf Stretch x 3 x 20 sec   Hamstring Stretch x 3 x 20 sec   Piriformis Stretch x 3 x 15 sec   Ball roll out  x 5 x 5 seconds    Cybex back extension  x 3 x 10                                              Plan for Next Visit:      HUMERA received the following manual therapy techniques: Joint mobilizations, Manual traction, Myofacial release, Manual Lymphatic Drainage, Soft tissue Mobilization, and Friction Massage were applied to the:  for (0) minutes, including:    Manual Intervention Performed  Today    Soft Tissue Mobilization   Lumbar Splay   Joint Mobilizations  LIAT luke Grades I-V   Mobilization with movement     Active release     Functional Dry Needling       Plan for Next Visit:      NEUROMUSCULAR RE-EDUCATION ACTIVITIES to improve Balance, Coordination, Kinesthetic, Sense, Proprioception, and Posture for 10 min .  The following were included:    Intervention Performed Today    Posterior Pelvic Tilt  X 10   Supine TrA Brace March  2 x 10   Brdige TrA Brace x 2 x 10   Open Book SL  2 x 10   Standing thoracic extension with shoulder abduction   2 x 10   Ball Squats  2 x 10   Thread the needle     Palloff press #1  3 x 10   Dead Bug with ball   X 20   Psoaos March  X 20   SLR  x 2 x 10 Bilateral     Plan for Next Visit:         Electrical Muscle Stimulation Performed TIME Intensity (mA)  Details   Lumbar paraspinals pre mod  With MHP in prone using 6 towel layers x 15 6 volts Linear bracket formation with 2 leads               STEM SETTINGS:  Hz, 150-250 ms      PATIENT EDUCATION AND HOME EXERCISES     Home Exercises Provided and Patient Education Provided     Education provided:    Patient educated on biomechanical justification for therapeutic exercise and importance of compliance with HEP in order to improve overall impairments and QOL   Patient was educated on all the above exercise prior/during/after for proper posture, positioning, and execution for safe performance with home exercise program.     Written Home Exercises Provided: yes.  Exercises were reviewed and JOAN was able to demonstrate them prior to the end of the session.  JOAN demonstrated good  understanding of the education provided. See EMR under Patient Instructions for exercises provided during therapy sessions.      ASSESSMENT     JOAN Guerrero tolerated PT session well with minimal complaints of pain or discomfort.   Exercises were reviewed today. Ended session with pre-mod and moist heat pack for pain control.      JOAN Is progressing well towards her goals.   Pt prognosis is Fair.     Pt will continue to benefit from skilled outpatient physical therapy to address the deficits listed in the problem list box on initial evaluation, provide pt/family education and to maximize pt's level of independence in the home and community environment.     Pt's spiritual, cultural and educational needs considered and pt agreeable to plan of care and goals.     Anticipated barriers to physical therapy: co-morbidities, sedentary lifestyle, and chronicity of condition    GOALS:  SHORT TERM GOALS: 3 weeks,  Progress   Recent signs and systems trend is improving in order to progress towards LTG's.     Patient will be independent with HEP in order to further progress and return to maximal function.     Pain rating at Worst: 5/10 in order to progress towards increased independence with activity.     Patient will be able to correct postural deviations in sitting and standing, to decrease pain and promote postural awareness for injury prevention.         LONG TERM GOALS: 6 weeks,  Progress   Patient will return to normal ADL, recreational, and work related activities with less pain and limitation.      Patient will improve AROM to stated goals in order to return to maximal functional potential.      Patient will improve Strength to stated goals of appropriate musculature in order to improve functional independence.      Pain Rating at Best: 1/10 to improve Quality of Life.      Patient will meet predicted functional outcome (FOTO) score: 5 points to increase self-worth & perceived functional ability.          PC = progressing/continue  PM= partially met  M=met    PLAN     kenyatta Mobley PTA

## 2023-04-10 ENCOUNTER — OFFICE VISIT (OUTPATIENT)
Dept: FAMILY MEDICINE | Facility: CLINIC | Age: 43
End: 2023-04-10
Payer: COMMERCIAL

## 2023-04-10 VITALS
SYSTOLIC BLOOD PRESSURE: 113 MMHG | HEART RATE: 81 BPM | TEMPERATURE: 98 F | OXYGEN SATURATION: 96 % | HEIGHT: 66 IN | BODY MASS INDEX: 43.62 KG/M2 | RESPIRATION RATE: 18 BRPM | WEIGHT: 271.38 LBS | DIASTOLIC BLOOD PRESSURE: 77 MMHG

## 2023-04-10 DIAGNOSIS — Z13.1 SCREENING FOR DIABETES MELLITUS: ICD-10-CM

## 2023-04-10 DIAGNOSIS — R31.9 HEMATURIA, UNSPECIFIED TYPE: ICD-10-CM

## 2023-04-10 DIAGNOSIS — Z12.72 SCREENING FOR VAGINAL CANCER: ICD-10-CM

## 2023-04-10 DIAGNOSIS — Z13.220 SCREENING FOR LIPOID DISORDERS: ICD-10-CM

## 2023-04-10 DIAGNOSIS — Z87.39 HX OF GOUT: ICD-10-CM

## 2023-04-10 DIAGNOSIS — M25.40 JOINT SWELLING: ICD-10-CM

## 2023-04-10 DIAGNOSIS — Z00.00 ROUTINE GENERAL MEDICAL EXAMINATION AT A HEALTH CARE FACILITY: Primary | ICD-10-CM

## 2023-04-10 DIAGNOSIS — F32.A DEPRESSION, UNSPECIFIED DEPRESSION TYPE: ICD-10-CM

## 2023-04-10 DIAGNOSIS — I10 HYPERTENSION, UNSPECIFIED TYPE: ICD-10-CM

## 2023-04-10 LAB
ALBUMIN SERPL BCP-MCNC: 3.8 G/DL (ref 3.5–5)
ALBUMIN/GLOB SERPL: 0.9 {RATIO}
ALP SERPL-CCNC: 74 U/L (ref 37–98)
ALT SERPL W P-5'-P-CCNC: 20 U/L (ref 13–56)
ANION GAP SERPL CALCULATED.3IONS-SCNC: 7 MMOL/L (ref 7–16)
AST SERPL W P-5'-P-CCNC: 14 U/L (ref 15–37)
BASOPHILS # BLD AUTO: 0.03 K/UL (ref 0–0.2)
BASOPHILS NFR BLD AUTO: 0.4 % (ref 0–1)
BILIRUB SERPL-MCNC: 0.3 MG/DL (ref ?–1.2)
BILIRUB SERPL-MCNC: ABNORMAL MG/DL
BLOOD URINE, POC: ABNORMAL
BUN SERPL-MCNC: 15 MG/DL (ref 7–18)
BUN/CREAT SERPL: 14 (ref 6–20)
CALCIUM SERPL-MCNC: 9.4 MG/DL (ref 8.5–10.1)
CANDIDA SPECIES: NEGATIVE
CHLAMYDIA BY PCR: NEGATIVE
CHLORIDE SERPL-SCNC: 106 MMOL/L (ref 98–107)
CHOLEST SERPL-MCNC: 168 MG/DL (ref 0–200)
CHOLEST/HDLC SERPL: 2.7 {RATIO}
CO2 SERPL-SCNC: 31 MMOL/L (ref 21–32)
COLOR, POC UA: ABNORMAL
CREAT SERPL-MCNC: 1.05 MG/DL (ref 0.55–1.02)
DIFFERENTIAL METHOD BLD: ABNORMAL
EGFR (NO RACE VARIABLE) (RUSH/TITUS): 69 ML/MIN/1.73M²
EOSINOPHIL # BLD AUTO: 0.1 K/UL (ref 0–0.5)
EOSINOPHIL NFR BLD AUTO: 1.2 % (ref 1–4)
ERYTHROCYTE [DISTWIDTH] IN BLOOD BY AUTOMATED COUNT: 15 % (ref 11.5–14.5)
GARDNERELLA: NEGATIVE
GLOBULIN SER-MCNC: 4.1 G/DL (ref 2–4)
GLUCOSE SERPL-MCNC: 92 MG/DL (ref 74–106)
GLUCOSE UR QL STRIP: ABNORMAL
HCT VFR BLD AUTO: 42 % (ref 38–47)
HDLC SERPL-MCNC: 62 MG/DL (ref 40–60)
HGB BLD-MCNC: 13.2 G/DL (ref 12–16)
IMM GRANULOCYTES # BLD AUTO: 0.03 K/UL (ref 0–0.04)
IMM GRANULOCYTES NFR BLD: 0.4 % (ref 0–0.4)
KETONES UR QL STRIP: ABNORMAL
LDLC SERPL CALC-MCNC: 74 MG/DL
LDLC/HDLC SERPL: 1.2 {RATIO}
LEUKOCYTE ESTERASE URINE, POC: ABNORMAL
LYMPHOCYTES # BLD AUTO: 3.07 K/UL (ref 1–4.8)
LYMPHOCYTES NFR BLD AUTO: 37.5 % (ref 27–41)
MCH RBC QN AUTO: 27.8 PG (ref 27–31)
MCHC RBC AUTO-ENTMCNC: 31.4 G/DL (ref 32–36)
MCV RBC AUTO: 88.4 FL (ref 80–96)
MONOCYTES # BLD AUTO: 0.38 K/UL (ref 0–0.8)
MONOCYTES NFR BLD AUTO: 4.6 % (ref 2–6)
MPC BLD CALC-MCNC: 10.3 FL (ref 9.4–12.4)
N. GONORRHOEAE (GC) BY PCR: NEGATIVE
NEUTROPHILS # BLD AUTO: 4.58 K/UL (ref 1.8–7.7)
NEUTROPHILS NFR BLD AUTO: 55.9 % (ref 53–65)
NITRITE, POC UA: ABNORMAL
NONHDLC SERPL-MCNC: 106 MG/DL
NRBC # BLD AUTO: 0 X10E3/UL
NRBC, AUTO (.00): 0 %
PH, POC UA: 6
PLATELET # BLD AUTO: 344 K/UL (ref 150–400)
POTASSIUM SERPL-SCNC: 3.4 MMOL/L (ref 3.5–5.1)
PROT SERPL-MCNC: 7.9 G/DL (ref 6.4–8.2)
PROTEIN, POC: ABNORMAL
RBC # BLD AUTO: 4.75 M/UL (ref 4.2–5.4)
RHEUMATOID FACT SER NEPH-ACNC: NEGATIVE [IU]/ML
SODIUM SERPL-SCNC: 141 MMOL/L (ref 136–145)
SPECIFIC GRAVITY, POC UA: 1.02
TRICHOMONAS: NEGATIVE
TRIGL SERPL-MCNC: 162 MG/DL (ref 35–150)
UROBILINOGEN, POC UA: 0.2
VLDLC SERPL-MCNC: 32 MG/DL
WBC # BLD AUTO: 8.19 K/UL (ref 4.5–11)

## 2023-04-10 PROCEDURE — 81003 POCT URINALYSIS W/O SCOPE: ICD-10-PCS | Mod: QW,,, | Performed by: NURSE PRACTITIONER

## 2023-04-10 PROCEDURE — 99396 PR PREVENTIVE VISIT,EST,40-64: ICD-10-PCS | Mod: ,,, | Performed by: NURSE PRACTITIONER

## 2023-04-10 PROCEDURE — 87480 CANDIDA DNA DIR PROBE: CPT | Mod: ,,, | Performed by: CLINICAL MEDICAL LABORATORY

## 2023-04-10 PROCEDURE — 86235 NUCLEAR ANTIGEN ANTIBODY: CPT | Mod: ,,, | Performed by: CLINICAL MEDICAL LABORATORY

## 2023-04-10 PROCEDURE — 87591 N.GONORRHOEAE DNA AMP PROB: CPT | Mod: ,,, | Performed by: CLINICAL MEDICAL LABORATORY

## 2023-04-10 PROCEDURE — 86038 ANA EIA W/REFLEX DSDNA/ENA: ICD-10-PCS | Mod: ,,, | Performed by: CLINICAL MEDICAL LABORATORY

## 2023-04-10 PROCEDURE — 80053 COMPREHEN METABOLIC PANEL: CPT | Mod: ,,, | Performed by: CLINICAL MEDICAL LABORATORY

## 2023-04-10 PROCEDURE — 80061 LIPID PANEL: CPT | Mod: ,,, | Performed by: CLINICAL MEDICAL LABORATORY

## 2023-04-10 PROCEDURE — 81003 URINALYSIS AUTO W/O SCOPE: CPT | Mod: QW,,, | Performed by: NURSE PRACTITIONER

## 2023-04-10 PROCEDURE — 87480 BACTERIAL VAGINOSIS: ICD-10-PCS | Mod: ,,, | Performed by: CLINICAL MEDICAL LABORATORY

## 2023-04-10 PROCEDURE — 3074F SYST BP LT 130 MM HG: CPT | Mod: ,,, | Performed by: NURSE PRACTITIONER

## 2023-04-10 PROCEDURE — 86235 ANTIEXTRACTABLE NUCLEAR AG: ICD-10-PCS | Mod: ,,, | Performed by: CLINICAL MEDICAL LABORATORY

## 2023-04-10 PROCEDURE — 1159F PR MEDICATION LIST DOCUMENTED IN MEDICAL RECORD: ICD-10-PCS | Mod: ,,, | Performed by: NURSE PRACTITIONER

## 2023-04-10 PROCEDURE — 1160F RVW MEDS BY RX/DR IN RCRD: CPT | Mod: ,,, | Performed by: NURSE PRACTITIONER

## 2023-04-10 PROCEDURE — 87660 TRICHOMONAS VAGIN DIR PROBE: CPT | Mod: ,,, | Performed by: CLINICAL MEDICAL LABORATORY

## 2023-04-10 PROCEDURE — 3078F DIAST BP <80 MM HG: CPT | Mod: ,,, | Performed by: NURSE PRACTITIONER

## 2023-04-10 PROCEDURE — 4010F PR ACE/ARB THEARPY RXD/TAKEN: ICD-10-PCS | Mod: ,,, | Performed by: NURSE PRACTITIONER

## 2023-04-10 PROCEDURE — 3008F PR BODY MASS INDEX (BMI) DOCUMENTED: ICD-10-PCS | Mod: ,,, | Performed by: NURSE PRACTITIONER

## 2023-04-10 PROCEDURE — 87491 CHLMYD TRACH DNA AMP PROBE: CPT | Mod: ,,, | Performed by: CLINICAL MEDICAL LABORATORY

## 2023-04-10 PROCEDURE — 85025 CBC WITH DIFFERENTIAL: ICD-10-PCS | Mod: ,,, | Performed by: CLINICAL MEDICAL LABORATORY

## 2023-04-10 PROCEDURE — 86225 ANTI-DNA ANTIBODY, DOUBLE-STRANDED: ICD-10-PCS | Mod: ,,, | Performed by: CLINICAL MEDICAL LABORATORY

## 2023-04-10 PROCEDURE — 85025 COMPLETE CBC W/AUTO DIFF WBC: CPT | Mod: ,,, | Performed by: CLINICAL MEDICAL LABORATORY

## 2023-04-10 PROCEDURE — 87660 BACTERIAL VAGINOSIS: ICD-10-PCS | Mod: ,,, | Performed by: CLINICAL MEDICAL LABORATORY

## 2023-04-10 PROCEDURE — 87491 CHLAMYDIA/GONORRHOEAE(GC), PCR: ICD-10-PCS | Mod: ,,, | Performed by: CLINICAL MEDICAL LABORATORY

## 2023-04-10 PROCEDURE — 86430 RHEUMATOID FACTOR TEST QUAL: CPT | Mod: ,,, | Performed by: CLINICAL MEDICAL LABORATORY

## 2023-04-10 PROCEDURE — 88142 CYTOPATH C/V THIN LAYER: CPT | Mod: TC,GCY | Performed by: NURSE PRACTITIONER

## 2023-04-10 PROCEDURE — 1159F MED LIST DOCD IN RCRD: CPT | Mod: ,,, | Performed by: NURSE PRACTITIONER

## 2023-04-10 PROCEDURE — 80061 LIPID PANEL: ICD-10-PCS | Mod: ,,, | Performed by: CLINICAL MEDICAL LABORATORY

## 2023-04-10 PROCEDURE — 99396 PREV VISIT EST AGE 40-64: CPT | Mod: ,,, | Performed by: NURSE PRACTITIONER

## 2023-04-10 PROCEDURE — 86038 ANTINUCLEAR ANTIBODIES: CPT | Mod: ,,, | Performed by: CLINICAL MEDICAL LABORATORY

## 2023-04-10 PROCEDURE — 4010F ACE/ARB THERAPY RXD/TAKEN: CPT | Mod: ,,, | Performed by: NURSE PRACTITIONER

## 2023-04-10 PROCEDURE — 87510 BACTERIAL VAGINOSIS: ICD-10-PCS | Mod: ,,, | Performed by: CLINICAL MEDICAL LABORATORY

## 2023-04-10 PROCEDURE — 1160F PR REVIEW ALL MEDS BY PRESCRIBER/CLIN PHARMACIST DOCUMENTED: ICD-10-PCS | Mod: ,,, | Performed by: NURSE PRACTITIONER

## 2023-04-10 PROCEDURE — 80053 COMPREHENSIVE METABOLIC PANEL: ICD-10-PCS | Mod: ,,, | Performed by: CLINICAL MEDICAL LABORATORY

## 2023-04-10 PROCEDURE — 86430 RHEUMATOID FACTOR SCREEN: ICD-10-PCS | Mod: ,,, | Performed by: CLINICAL MEDICAL LABORATORY

## 2023-04-10 PROCEDURE — 3078F PR MOST RECENT DIASTOLIC BLOOD PRESSURE < 80 MM HG: ICD-10-PCS | Mod: ,,, | Performed by: NURSE PRACTITIONER

## 2023-04-10 PROCEDURE — 87510 GARDNER VAG DNA DIR PROBE: CPT | Mod: ,,, | Performed by: CLINICAL MEDICAL LABORATORY

## 2023-04-10 PROCEDURE — 87086 CULTURE, URINE: ICD-10-PCS | Mod: ,,, | Performed by: CLINICAL MEDICAL LABORATORY

## 2023-04-10 PROCEDURE — 3008F BODY MASS INDEX DOCD: CPT | Mod: ,,, | Performed by: NURSE PRACTITIONER

## 2023-04-10 PROCEDURE — 3074F PR MOST RECENT SYSTOLIC BLOOD PRESSURE < 130 MM HG: ICD-10-PCS | Mod: ,,, | Performed by: NURSE PRACTITIONER

## 2023-04-10 PROCEDURE — 86225 DNA ANTIBODY NATIVE: CPT | Mod: ,,, | Performed by: CLINICAL MEDICAL LABORATORY

## 2023-04-10 PROCEDURE — 87086 URINE CULTURE/COLONY COUNT: CPT | Mod: ,,, | Performed by: CLINICAL MEDICAL LABORATORY

## 2023-04-10 PROCEDURE — 87591 CHLAMYDIA/GONORRHOEAE(GC), PCR: ICD-10-PCS | Mod: ,,, | Performed by: CLINICAL MEDICAL LABORATORY

## 2023-04-10 NOTE — LETTER
April 10, 2023      [facility]  [address] [facility]  [place] [place] [address]  Phone: [phone]  Fax: [phone]       Patient: Humera Guerrero   YOB: 1980  Date of Visit: 04/10/2023    To Whom It May Concern:    SHAJI Guerrero  was at [facility] on 04/10/2023. The patient may return to work/school on 4/11/2023 with no restrictions. If you have any questions or concerns, or if I can be of further assistance, please do not hesitate to contact me.    Sincerely,    SHANNON Callaway

## 2023-04-10 NOTE — PROGRESS NOTES
Subjective     Patient ID: Humera Guerrero is a 42 y.o. female.    Chief Complaint: Healthy You (Room 8///)    HPI      Humera Guerrero is a 42 y.o. female who presents for routine wellness visit. Pt reports concern for swelling in joints. Hands, ankles and knees, intermittent. Occasional palpitation or needs to take deep breath. No hx of MARTY. Hx of back pain. MRI lumbar spine pending, PT completed. Hx of sciatica with back pain now bilaterally. Pt denies FHx of colon cancer. PGM hx of breast cancer, cervical cancer hx MGM.  Last colonoscopy was never with 45 year old recs. Last mammogram was11/4/2022 negative. Last pap was 2020, routine follow up recs. 10/20/2022 Had flu shot. Nonsmoker. Working on improving diet and exercise.      Review of Systems   Constitutional:  Positive for activity change and unexpected weight change.   HENT:  Negative for hearing loss, rhinorrhea and trouble swallowing.    Eyes:  Negative for discharge and visual disturbance.   Respiratory:  Negative for chest tightness and wheezing.    Cardiovascular:  Negative for chest pain and palpitations.   Gastrointestinal:  Positive for constipation. Negative for blood in stool, diarrhea and vomiting.   Endocrine: Negative for polydipsia and polyuria.   Genitourinary:  Negative for difficulty urinating, dysuria, hematuria and menstrual problem.   Musculoskeletal:  Negative for arthralgias, joint swelling and neck pain.   Neurological:  Positive for headaches. Negative for weakness.   Psychiatric/Behavioral:  Positive for dysphoric mood. Negative for confusion.      Tobacco Use: Medium Risk    Smoking Tobacco Use: Former    Smokeless Tobacco Use: Never    Passive Exposure: Past     Review of patient's allergies indicates:  No Known Allergies  Current Outpatient Medications   Medication Instructions    atorvastatin (LIPITOR) 10 mg, Oral, Nightly    colchicine (COLCRYS) 0.6 mg tablet TAKE 1 TABLET BY MOUTH EVERY DAY    estradioL (ESTRACE) 1 mg,  "Oral, Daily    gabapentin (NEURONTIN) 100 mg, Oral, 3 times daily    hydrOXYzine pamoate (VISTARIL) 25 mg, Oral, Every 8 hours PRN    ibuprofen (ADVIL,MOTRIN) 800 mg, Oral, Every 6 hours PRN    losartan (COZAAR) 25 mg, Oral, Daily    sertraline (ZOLOFT) 25 mg, Oral, Daily     There are no discontinued medications.    Past Medical History:   Diagnosis Date    Cerebral cyst     Other hyperlipidemia     Prediabetes     Vitamin D insufficiency      Health Maintenance Topics with due status: Not Due       Topic Last Completion Date    Hemoglobin A1c (Prediabetes) 07/18/2022    TETANUS VACCINE 10/25/2022    Mammogram 11/04/2022     Immunization History   Administered Date(s) Administered    Hepatitis B, Adult 01/10/2023    Influenza 10/20/2022    Tdap 10/25/2022    Varicella 01/10/2023       Objective   Vision Screening    Right eye Left eye Both eyes   Without correction      With correction 20/15 20/13 20/13   Comments: Pt has on glasses at this time    Over the last two weeks how often have you been bothered by little interest or pleasure in doing things: 0  Over the last two weeks how often have you been bothered by feeling down, depressed or hopeless: 1  PHQ-2 Total Score: 1      Body mass index is 43.8 kg/m².  Wt Readings from Last 3 Encounters:   04/10/23 123.1 kg (271 lb 6 oz)   03/27/23 121.1 kg (267 lb)   02/06/23 121.2 kg (267 lb 2 oz)     Ht Readings from Last 3 Encounters:   04/10/23 5' 6" (1.676 m)   03/27/23 5' 6" (1.676 m)   02/06/23 5' 6" (1.676 m)     BP Readings from Last 3 Encounters:   04/10/23 113/77   03/27/23 (!) 150/76   02/06/23 122/81     Temp Readings from Last 3 Encounters:   04/10/23 98.2 °F (36.8 °C)   03/27/23 97.7 °F (36.5 °C) (Oral)   02/06/23 98 °F (36.7 °C)     Pulse Readings from Last 3 Encounters:   04/10/23 81   03/27/23 80   02/06/23 83     Resp Readings from Last 3 Encounters:   04/10/23 18   03/27/23 18   02/06/23 18     PF Readings from Last 3 Encounters:   No data found for PF "       Physical Exam  Vitals and nursing note reviewed. Exam conducted with a chaperone present.   Constitutional:       Appearance: Normal appearance. She is obese.   HENT:      Head: Normocephalic.      Right Ear: Tympanic membrane, ear canal and external ear normal.      Left Ear: Tympanic membrane, ear canal and external ear normal.      Nose: Nose normal.      Mouth/Throat:      Mouth: Mucous membranes are moist.   Eyes:      Extraocular Movements: Extraocular movements intact.      Conjunctiva/sclera: Conjunctivae normal.      Pupils: Pupils are equal, round, and reactive to light.   Cardiovascular:      Rate and Rhythm: Normal rate and regular rhythm.      Pulses: Normal pulses.      Heart sounds: Normal heart sounds. No murmur heard.  Pulmonary:      Effort: Pulmonary effort is normal.      Breath sounds: Normal breath sounds. No stridor. No wheezing or rhonchi.   Chest:      Chest wall: No mass, lacerations, deformity, swelling, tenderness, crepitus or edema. There is no dullness to percussion.   Breasts:     Jasen Score is 5.      Breasts are symmetrical.      Right: Normal. No swelling, bleeding, inverted nipple, mass, nipple discharge, skin change or tenderness.      Left: Normal. No swelling, bleeding, inverted nipple, mass, nipple discharge, skin change or tenderness.   Abdominal:      General: Bowel sounds are normal. There is no distension.      Palpations: Abdomen is soft. There is no mass.      Tenderness: There is no abdominal tenderness.      Hernia: There is no hernia in the left inguinal area or right inguinal area.   Genitourinary:     Exam position: Lithotomy position.      Pubic Area: No rash or pubic lice.       Jasen stage (genital): 5.      Labia:         Right: No rash, tenderness, lesion or injury.         Left: No rash, tenderness, lesion or injury.       Urethra: No prolapse, urethral pain, urethral swelling or urethral lesion.      Vagina: Vaginal discharge present.      Rectum:  Normal.      Comments: Medium speculum   Prior hysterectomy, vaginal swabs obtained  Small amt white vaginal discharge. No bleeding.  Musculoskeletal:         General: No swelling or tenderness. Normal range of motion.      Cervical back: Normal range of motion and neck supple.      Right lower leg: No edema.      Left lower leg: No edema.   Lymphadenopathy:      Upper Body:      Right upper body: No supraclavicular, axillary or pectoral adenopathy.      Left upper body: No supraclavicular, axillary or pectoral adenopathy.      Lower Body: No right inguinal adenopathy. No left inguinal adenopathy.   Skin:     General: Skin is warm and dry.      Capillary Refill: Capillary refill takes less than 2 seconds.   Neurological:      General: No focal deficit present.      Mental Status: She is alert and oriented to person, place, and time. Mental status is at baseline.      Cranial Nerves: No cranial nerve deficit.      Sensory: No sensory deficit.      Motor: No weakness.   Psychiatric:         Mood and Affect: Mood normal.         Behavior: Behavior normal.         Thought Content: Thought content normal.         Judgment: Judgment normal.       Assessment and Plan     Problem List Items Addressed This Visit          Cardiac/Vascular    Hypertension     Other Visit Diagnoses       Routine general medical examination at a health care facility    -  Primary    Relevant Orders    Lipid Panel    Comprehensive Metabolic Panel    POCT URINALYSIS W/O SCOPE (Completed)    CBC Auto Differential    Screening for diabetes mellitus        Relevant Orders    Comprehensive Metabolic Panel    Screening for lipoid disorders        Relevant Orders    Lipid Panel    Screening for vaginal cancer        Relevant Orders    ThinPrep Pap Test    Bacterial Vaginosis    Chlamydia/GC, PCR    Joint swelling        Relevant Orders    CORAL EIA w/ Reflex to dsDNA/LUIS ARMANDO    Rheumatoid Factor Screen              Plan: exercise weight loss healthy diet  recommended    Continue home bp and antidepressant medication  Will screen for RA due to reported joint pain and swelling  Wellness labs and pap performed    I have reviewed the medications, allergies, and problem list.     Goal Actions:    What type of visit is the patient here for today?: Healthy You  Does the patient consent to enroll in Color Me Healthy?: Yes  Is this a Wellness Follow Up?: No  What is your overall wellness goal? (select at least one): Improve overall health, Lose weight  Choose 3: Lifestyle, Exercise, Nutrition, Biometric  Biometric Actions: BMI>30 lose 1-2 lbs per week, SBP<120 and DBP<80  Lifestyle Actions : Obtain yearly mammogram  Nurtrition Actions: drink 8-10 glasses of water per day, Eat a well-balanced diet, Decrease intake of fast food, Recommend weight loss  Exercise Actions: Recommend physical activity 30 minutes per day 3-5 times/week      Total time spent face-to-face and non-face-to-face coordinating care for this encounter was: >30 min    Chronic conditions status updated as per HPI.  Other than changes above, cont current medications and maintain follow up with specialists.  Return to clinic 1 year next wellness, 3 mo htn follow up.    Sultana HUTSON  [facility]

## 2023-04-10 NOTE — PATIENT INSTRUCTIONS
"Patient Education       High Blood Pressure in Adults   The Basics   Written by the doctors and editors at [facility]   What is high blood pressure? -- High blood pressure is a condition that puts you at risk for heart attack, stroke, and kidney disease. It does not usually cause symptoms. But it can be serious.  When your doctor or nurse tells you your blood pressure, they will say 2 numbers. For instance, your doctor or nurse might say that your blood pressure is "130 over 80." The top number is the pressure inside your arteries when your heart is arianna. The bottom number is the pressure inside your arteries when your heart is relaxed.  "Elevated blood pressure" is a term doctors or nurses use as a warning. People with elevated blood pressure do not yet have high blood pressure. But their blood pressure is not as low as it should be for good health.  Many experts define high, elevated, and normal blood pressure as follows:  High - Top number of 130 or above and/or bottom number of 80 or above  Elevated - Top number between 120 and 129 and bottom number of 79 or below  Normal - Top number of 119 or below and bottom number of 79 or below  This information is also in the table (table 1).   How can I lower my blood pressure? -- If your doctor or nurse has prescribed blood pressure medicine, the most important thing you can do is to take it. If it causes side effects, do not just stop taking it. Instead, talk to your doctor or nurse about the problems it causes. They might be able to lower your dose or switch you to another medicine. If cost is a problem, mention that too. They might be able to put you on a less expensive medicine. Taking your blood pressure medicine can keep you from having a heart attack or stroke, and it can save your life!  Can I do anything on my own? -- You have a lot of control over your blood pressure. To lower it:  Lose weight (if you are overweight)  Choose a diet low in fat and rich in " "fruits, vegetables, and low-fat dairy products  Reduce the amount of salt you eat  Do something active for at least 30 minutes a day on most days of the week  Cut down on alcohol (if you drink more than 2 alcoholic drinks per day)  It's also a good idea to get a home blood pressure meter. People who check their own blood pressure at home do better at keeping it low and can sometimes even reduce the amount of medicine they take.  All topics are updated as new evidence becomes available and our peer review process is complete.  This topic retrieved from [employer] on: Sep 21, 2021.  Topic 78126 Version 15.0  Release: 29.4.2 - C29.263  © 2021 UpToDate, Inc. and/or its affiliates. All rights reserved.  table 1: Definition of normal and high blood pressure  Level  Top number  Bottom number    High 130 or above 80 or above   Elevated 120 to 129 79 or below   Normal 119 or below 79 or below   These definitions are from the American College of Cardiology/American Heart Association. Other expert groups might use slightly different definitions.  "Elevated blood pressure" is a term doctor or nurses use as a warning. It means you do not yet have high blood pressure, but your blood pressure is not as low as it should be for good health.  Graphic 32066 Version 6.0  Consumer Information Use and Disclaimer   This information is not specific medical advice and does not replace information you receive from your health care provider. This is only a brief summary of general information. It does NOT include all information about conditions, illnesses, injuries, tests, procedures, treatments, therapies, discharge instructions or life-style choices that may apply to you. You must talk with your health care provider for complete information about your health and treatment options. This information should not be used to decide whether or not to accept your health care provider's advice, instructions or recommendations. Only your health care " provider has the knowledge and training to provide advice that is right for you. The use of this information is governed by the [employer] End User License Agreement, available at https://[email or web address]/solutions/[employer]um[employer]/about/mel.The use of [employer] content is governed by the [employer] Terms of Use. ©2021 UpToDate, Inc. All rights reserved.  Copyright   © 2021 UpToDate, Inc. and/or its affiliates. All rights reserved.

## 2023-04-11 ENCOUNTER — PATIENT OUTREACH (OUTPATIENT)
Dept: ADMINISTRATIVE | Facility: HOSPITAL | Age: 43
End: 2023-04-11
Payer: COMMERCIAL

## 2023-04-11 LAB
ANA SER QL: POSITIVE
DSDNA IGG SERPL IA-ACNC: 4 IU (ref 0–24)
DSDNA IGG SERPL IA-ACNC: NEGATIVE [IU]/ML
ENA AB SER QL IA: 3.2 EUS (ref 0–19.9)
ENA AB SER QL IA: NEGATIVE

## 2023-04-11 NOTE — PROGRESS NOTES
.Post visit Population Health review of encounter with date of service  4/10 with Rashaad.  All required HY components in encounter.    Added myself to care team          .  Health Maintenance Due   Topic Date Due    COVID-19 Vaccine (1) Never done

## 2023-04-12 ENCOUNTER — OFFICE VISIT (OUTPATIENT)
Dept: PRIMARY CARE CLINIC | Facility: CLINIC | Age: 43
End: 2023-04-12
Payer: COMMERCIAL

## 2023-04-12 VITALS
SYSTOLIC BLOOD PRESSURE: 124 MMHG | HEIGHT: 66 IN | BODY MASS INDEX: 43.55 KG/M2 | OXYGEN SATURATION: 97 % | DIASTOLIC BLOOD PRESSURE: 66 MMHG | WEIGHT: 271 LBS | TEMPERATURE: 98 F | HEART RATE: 90 BPM

## 2023-04-12 DIAGNOSIS — M54.42 CHRONIC LEFT-SIDED LOW BACK PAIN WITH LEFT-SIDED SCIATICA: ICD-10-CM

## 2023-04-12 DIAGNOSIS — G89.29 CHRONIC LEFT-SIDED LOW BACK PAIN WITH LEFT-SIDED SCIATICA: ICD-10-CM

## 2023-04-12 DIAGNOSIS — M54.41 ACUTE BILATERAL LOW BACK PAIN WITH RIGHT-SIDED SCIATICA: ICD-10-CM
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
## 2023-04-12 LAB
GH SERPL-MCNC: NORMAL NG/ML
INSULIN SERPL-ACNC: NORMAL U[IU]/ML
LAB AP CLINICAL INFORMATION: NORMAL
LAB AP GYN INTERPRETATION: NEGATIVE
LAB AP PAP DISCLAIMER COMMENTS: NORMAL
RENIN PLAS-CCNC: NORMAL NG/ML/H
UA COMPLETE W REFLEX CULTURE PNL UR: NORMAL

## 2023-04-12 PROCEDURE — 99213 PR OFFICE/OUTPT VISIT, EST, LEVL III, 20-29 MIN: ICD-10-PCS | Mod: ,,, | Performed by: NURSE PRACTITIONER

## 2023-04-12 PROCEDURE — 99213 OFFICE O/P EST LOW 20 MIN: CPT | Mod: ,,, | Performed by: NURSE PRACTITIONER
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
## 2023-04-17 ENCOUNTER — TELEPHONE (OUTPATIENT)
Dept: PRIMARY CARE CLINIC | Facility: CLINIC | Age: 43
End: 2023-04-17
Payer: COMMERCIAL

## 2023-04-17 DIAGNOSIS — M54.42 ACUTE MIDLINE LOW BACK PAIN WITH LEFT-SIDED SCIATICA: Primary | ICD-10-CM

## 2023-04-17 NOTE — PROGRESS NOTES
Subjective     Patient ID: Humera Guerrero is a 42 y.o. female.    Chief Complaint: No chief complaint on file.    HPI  Review of Systems       Objective     Physical Exam       Assessment and Plan     Problem List Items Addressed This Visit    None      Referral to PTC, Dr. Sanchez only

## 2023-04-27 RX ORDER — COLCHICINE 0.6 MG/1
TABLET ORAL
Qty: 30 TABLET | Refills: 0 | Status: SHIPPED | OUTPATIENT
Start: 2023-04-27 | End: 2023-05-26

## 2023-05-01 ENCOUNTER — HOSPITAL ENCOUNTER (OUTPATIENT)
Dept: RADIOLOGY | Facility: HOSPITAL | Age: 43
Discharge: HOME | End: 2023-05-01
Attending: PAIN MEDICINE
Payer: COMMERCIAL

## 2023-05-01 ENCOUNTER — OFFICE VISIT (OUTPATIENT)
Dept: PAIN MEDICINE | Facility: CLINIC | Age: 43
End: 2023-05-01
Payer: COMMERCIAL

## 2023-05-01 VITALS
RESPIRATION RATE: 18 BRPM | BODY MASS INDEX: 44.03 KG/M2 | SYSTOLIC BLOOD PRESSURE: 125 MMHG | HEART RATE: 80 BPM | HEIGHT: 66 IN | DIASTOLIC BLOOD PRESSURE: 77 MMHG | WEIGHT: 274 LBS

## 2023-05-01 DIAGNOSIS — Z79.899 ENCOUNTER FOR LONG-TERM (CURRENT) USE OF OTHER MEDICATIONS: Primary | ICD-10-CM

## 2023-05-01 DIAGNOSIS — M54.42 ACUTE MIDLINE LOW BACK PAIN WITH LEFT-SIDED SCIATICA: ICD-10-CM

## 2023-05-01 DIAGNOSIS — M46.1 SACROILIITIS: ICD-10-CM

## 2023-05-01 DIAGNOSIS — M47.817 SPONDYLOSIS OF LUMBOSACRAL REGION WITHOUT MYELOPATHY OR RADICULOPATHY: ICD-10-CM

## 2023-05-01 LAB
CTP QC/QA: YES
POC (AMP) AMPHETAMINE: NEGATIVE
POC (BAR) BARBITURATES: NEGATIVE
POC (BUP) BUPRENORPHINE: NEGATIVE
POC (BZO) BENZODIAZEPINES: NEGATIVE
POC (COC) COCAINE: NEGATIVE
POC (MDMA) METHYLENEDIOXYMETHAMPHETAMINE 3,4: NEGATIVE
POC (MET) METHAMPHETAMINE: NEGATIVE
POC (MOP) OPIATES: NEGATIVE
POC (MTD) METHADONE: NEGATIVE
POC (OXY) OXYCODONE: NEGATIVE
POC (PCP) PHENCYCLIDINE: NEGATIVE
POC (TCA) TRICYCLIC ANTIDEPRESSANTS: NEGATIVE
POC TEMPERATURE (URINE): 90
POC THC: NEGATIVE

## 2023-05-01 PROCEDURE — 99203 OFFICE O/P NEW LOW 30 MIN: CPT | Mod: S$PBB,,, | Performed by: PAIN MEDICINE

## 2023-05-01 PROCEDURE — G0481 DRUG TEST DEF 8-14 CLASSES: HCPCS | Mod: ,,, | Performed by: CLINICAL MEDICAL LABORATORY

## 2023-05-01 PROCEDURE — 72202 X-RAY EXAM SI JOINTS 3/> VWS: CPT | Mod: 26,,, | Performed by: STUDENT IN AN ORGANIZED HEALTH CARE EDUCATION/TRAINING PROGRAM

## 2023-05-01 PROCEDURE — 72202 X-RAY EXAM SI JOINTS 3/> VWS: CPT | Mod: TC

## 2023-05-01 PROCEDURE — G0481 DRUG SCREEN DEFINITIVE 14, URINE: ICD-10-PCS | Mod: ,,, | Performed by: CLINICAL MEDICAL LABORATORY

## 2023-05-01 PROCEDURE — 80305 DRUG TEST PRSMV DIR OPT OBS: CPT | Mod: PBBFAC | Performed by: PAIN MEDICINE

## 2023-05-01 PROCEDURE — 99215 OFFICE O/P EST HI 40 MIN: CPT | Mod: PBBFAC | Performed by: PAIN MEDICINE

## 2023-05-01 PROCEDURE — 72202 XR SACROILIAC JOINTS COMPLETE: ICD-10-PCS | Mod: 26,,, | Performed by: STUDENT IN AN ORGANIZED HEALTH CARE EDUCATION/TRAINING PROGRAM

## 2023-05-01 PROCEDURE — 99203 PR OFFICE/OUTPT VISIT, NEW, LEVL III, 30-44 MIN: ICD-10-PCS | Mod: S$PBB,,, | Performed by: PAIN MEDICINE

## 2023-05-01 NOTE — PATIENT INSTRUCTIONS
BILATERAL L4-S1 MBB 5/12/23 AT 9:00 AM    Procedure Instructions:    Nothing to eat or drink for 8 hours or after midnight including gum, candy, mints, or tobacco products.  If you are scheduled for 1:30 or later nothing to eat or drink after 5 a.m. the morning of the procedure, including gum, candy, mints, or tobacco products.  Must have a  at least 18 yrs of age to stay present at all times  No Diabetic medications the morning of procedure, check blood sugar the morning of procedure, if it is greater than 200 call the office at [phone]  If you are started on antibiotics or have been prescribed antibiotics, have a fever, or have any other type of infection call to reschedule procedure.  If you take blood pressure medications you can take it at your regular scheduled time with a small sip of WATER!    HOLD ASPIRIN AND ASPIRIN PRODUCTS  (ASPIRIN, BC POWDER ETC. ) FOR 7 DAYS  PRIOR TO PROCEDURE  HOLD NSAIDS( ibuprofen, mobic, meloxicam, advil, diclofenac, naproxen, relafen, celebrex,  methotrexate, aleve etc....)  FOR 3 DAYS   PRIOR TO PROCEDURE
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
## 2023-05-12 ENCOUNTER — HOSPITAL ENCOUNTER (OUTPATIENT)
Facility: HOSPITAL | Age: 43
Discharge: HOME | End: 2023-05-12
Attending: PAIN MEDICINE | Admitting: PAIN MEDICINE
Payer: COMMERCIAL

## 2023-05-12 ENCOUNTER — ANESTHESIA EVENT (OUTPATIENT)
Dept: PAIN MEDICINE | Facility: HOSPITAL | Age: 43
End: 2023-05-12
Payer: COMMERCIAL

## 2023-05-12 ENCOUNTER — ANESTHESIA (OUTPATIENT)
Dept: PAIN MEDICINE | Facility: HOSPITAL | Age: 43
End: 2023-05-12
Payer: COMMERCIAL

## 2023-05-12 VITALS
HEART RATE: 66 BPM | RESPIRATION RATE: 16 BRPM | HEIGHT: 66 IN | WEIGHT: 273 LBS | BODY MASS INDEX: 43.87 KG/M2 | DIASTOLIC BLOOD PRESSURE: 72 MMHG | SYSTOLIC BLOOD PRESSURE: 122 MMHG | OXYGEN SATURATION: 100 % | TEMPERATURE: 99 F

## 2023-05-12 DIAGNOSIS — M47.817 SPONDYLOSIS OF LUMBOSACRAL REGION WITHOUT MYELOPATHY OR RADICULOPATHY: Primary | ICD-10-CM

## 2023-05-12 PROCEDURE — 63600175 PHARM REV CODE 636 W HCPCS: Performed by: NURSE ANESTHETIST, CERTIFIED REGISTERED

## 2023-05-12 PROCEDURE — 64493 INJ PARAVERT F JNT L/S 1 LEV: CPT | Mod: 50 | Performed by: PAIN MEDICINE

## 2023-05-12 PROCEDURE — 25000003 PHARM REV CODE 250: Performed by: PAIN MEDICINE

## 2023-05-12 PROCEDURE — D9220A PRA ANESTHESIA: ICD-10-PCS | Mod: ,,, | Performed by: NURSE ANESTHETIST, CERTIFIED REGISTERED

## 2023-05-12 PROCEDURE — 64494 INJ PARAVERT F JNT L/S 2 LEV: CPT | Mod: 50 | Performed by: PAIN MEDICINE

## 2023-05-12 PROCEDURE — 25000003 PHARM REV CODE 250: Performed by: NURSE ANESTHETIST, CERTIFIED REGISTERED

## 2023-05-12 PROCEDURE — 64493 PR INJ DX/THER AGNT PARAVERT FACET JOINT,IMG GUIDE,LUMBAR/SAC,1ST LVL: ICD-10-PCS | Mod: 50,,, | Performed by: PAIN MEDICINE

## 2023-05-12 PROCEDURE — 64493 INJ PARAVERT F JNT L/S 1 LEV: CPT | Mod: 50,,, | Performed by: PAIN MEDICINE

## 2023-05-12 PROCEDURE — D9220A PRA ANESTHESIA: Mod: ,,, | Performed by: NURSE ANESTHETIST, CERTIFIED REGISTERED

## 2023-05-12 PROCEDURE — 63600175 PHARM REV CODE 636 W HCPCS: Performed by: PAIN MEDICINE

## 2023-05-12 PROCEDURE — 37000008 HC ANESTHESIA 1ST 15 MINUTES: Performed by: PAIN MEDICINE

## 2023-05-12 PROCEDURE — 64494 INJ PARAVERT F JNT L/S 2 LEV: CPT | Mod: 50,,, | Performed by: PAIN MEDICINE

## 2023-05-12 PROCEDURE — 64494 PR INJ DX/THER AGNT PARAVERT FACET JOINT,IMG GUIDE,LUMBAR/SAC, 2ND LEVEL: ICD-10-PCS | Mod: 50,,, | Performed by: PAIN MEDICINE

## 2023-05-12 RX ORDER — TRIAMCINOLONE ACETONIDE 40 MG/ML
INJECTION, SUSPENSION INTRA-ARTICULAR; INTRAMUSCULAR CODE/TRAUMA/SEDATION MEDICATION
Status: DISCONTINUED | OUTPATIENT
Start: 2023-05-12 | End: 2023-05-12 | Stop reason: HOSPADM

## 2023-05-12 RX ORDER — SODIUM CHLORIDE 9 MG/ML
INJECTION, SOLUTION INTRAVENOUS CONTINUOUS
Status: DISCONTINUED | OUTPATIENT
Start: 2023-05-12 | End: 2023-05-12 | Stop reason: HOSPADM

## 2023-05-12 RX ORDER — LIDOCAINE HYDROCHLORIDE 20 MG/ML
INJECTION INTRAVENOUS
Status: DISCONTINUED | OUTPATIENT
Start: 2023-05-12 | End: 2023-05-12

## 2023-05-12 RX ORDER — BUPIVACAINE HYDROCHLORIDE 2.5 MG/ML
INJECTION, SOLUTION INFILTRATION; PERINEURAL CODE/TRAUMA/SEDATION MEDICATION
Status: DISCONTINUED | OUTPATIENT
Start: 2023-05-12 | End: 2023-05-12 | Stop reason: HOSPADM

## 2023-05-12 RX ORDER — PROPOFOL 10 MG/ML
VIAL (ML) INTRAVENOUS
Status: DISCONTINUED | OUTPATIENT
Start: 2023-05-12 | End: 2023-05-12

## 2023-05-12 RX ADMIN — SODIUM CHLORIDE: 9 INJECTION, SOLUTION INTRAVENOUS at 11:05

## 2023-05-12 RX ADMIN — PROPOFOL 100 MG: 10 INJECTION, EMULSION INTRAVENOUS at 11:05

## 2023-05-12 RX ADMIN — LIDOCAINE HYDROCHLORIDE 100 MG: 20 INJECTION, SOLUTION INTRAVENOUS at 11:05

## 2023-05-12 NOTE — INTERVAL H&P NOTE
The patient has been examined and the H&P has been reviewed:    I concur with the findings and no changes have occurred since H&P was written.    Surgery risks, benefits and alternative options discussed and understood by patient/family.          There are no hospital problems to display for this patient.     94

## 2023-05-12 NOTE — BRIEF OP NOTE
Discharge Note  Short Stay    Admit Date: 5/12/2023    Discharge Date: 5/12/2023    Attending Physician: Carri Sanchez     Discharge Provider: Carri Sanchez    Diagnosis:  Lumbosacral spondylosis    Discharged Condition: Good    Final Diagnoses: Spondylosis of lumbosacral region without myelopathy or radiculopathy [M47.817]    Disposition: Home or Self Care    Hospital Course: No complications, uneventful    Outcome of Hospitalization, Treatment, Procedure, or Surgery:  Patient was admitted for outpatient interventional pain management procedure. The patient tolerated the procedure well with no complications.    Follow up/Patient Instructions:  Follow up as scheduled in Pain Management office in 3-4 weeks.  Patient has received instructions and follow up date and time.    Medications:  Continue previous medications

## 2023-05-12 NOTE — ANESTHESIA POSTPROCEDURE EVALUATION
Anesthesia Post Evaluation    Patient: Humera Guerrero    Procedure(s) Performed: Procedure(s) (LRB):  Bilateral L4-5,5-S1 MBB (Bilateral)    Final Anesthesia Type: general      Patient location: [facility].  Patient participation: Yes- Able to Participate  Level of consciousness: awake and alert  Post-procedure vital signs: reviewed and stable  Pain management: adequate  Airway patency: patent    PONV status at discharge: No PONV  Anesthetic complications: no      Cardiovascular status: blood pressure returned to baseline, hemodynamically stable and stable  Respiratory status: unassisted  Hydration status: euvolemic  Follow-up not needed.  Comments: Pt voices appreciation for care          Vitals Value Taken Time   BP 99/61 05/12/23 1121   Temp 37 °C (98.6 °F) 05/12/23 1121   Pulse 84 05/12/23 1121   Resp 18 05/12/23 1121   SpO2 99 % 05/12/23 1121         No case tracking events are documented in the log.      Pain/Ana Score: No data recorded

## 2023-05-12 NOTE — OP NOTE
Procedure Note    Procedure Date: 5/12/2023    Procedure Performed:  Bilateral  Lumbar Facet  Medial Branch Block @ L4-5,5-S1 with Fluoroscopic Guidance    Indications: Patient has failed conservative therapy.      Pre-op diagnosis: Lumbar Spondylosis    Post-op diagnosis: same    Physician: WIN Sanchez MD    Anesthesia: MAC    Estimated Blood Loss: Less than 1cc    IVF: Per Anesthesia    Complications: None    Technique:  The patient was interviewed in the holding area and Risks/Benefits were discussed, including but not limited to  the possibility of new or different pain, bleeding or infection.   All questions were answered.  The patient understood and accepted risks.  Consent was reviewed and signed.  A time out was taken to identify the patient, procedure and side of the procedure. The patient was placed in a prone position, then prepped and draped in the usual sterile fashion using ChloraPrep and sterile towels.  The levels were determined under fluoroscopic guidance and then marked.  1% Lidocaine was given by raising a skin wheal at the skin over each site and then infiltrated.  A 22-gauge 5 inch needle was introduced to the anatomic location of the bilateral L4-5,5-S1 medial branch nerves.  Then, after negative aspiration, 1 cc of a 1:1 mixture of  (Bupivacaine 0.25% 3cc and  40mg kenalog ) was injected @ each level.The patient tolerated the procedure well and was transferred to the [facility] in stable condition.  The patient was monitored after the procedure.  Patient was given post procedure and discharge instructions to follow at home.  The patient was discharged in a stable condition with an adult .

## 2023-05-12 NOTE — DISCHARGE SUMMARY
[facility] - Pain Management  Discharge Note  Short Stay    Procedure(s) (LRB):  Bilateral L4-5,5-S1 MBB (Bilateral)      OUTCOME: Patient tolerated treatment/procedure well without complication and is now ready for discharge.    DISPOSITION: Home or Self Care    FINAL DIAGNOSIS:  Lumbosacral spondylosis    FOLLOWUP: In clinic    DISCHARGE INSTRUCTIONS:  See nurse's notes     TIME SPENT ON DISCHARGE: 5 minutes

## 2023-05-12 NOTE — PLAN OF CARE
Plan:  D/c pt via wheelchair at 1203    Informed pt if does not void in 8 hours to go to ER. Notify if redness, drainage, from injection site or fever over next 3-4 days. Rest and drink plenty of fluids for the remainder of the day. No lifting over 5 lbs. For the remainder of the day. Continue regular medications as prescribed. May take pain medications as prescribed.     Pain improved 100%

## 2023-05-12 NOTE — ANESTHESIA PREPROCEDURE EVALUATION
05/12/2023  Humera Guerrero is a 42 y.o., female.      Pre-op Assessment    I have reviewed the Patient Summary Reports.     I have reviewed the Nursing Notes. I have reviewed the NPO Status.   I have reviewed the Medications.     Review of Systems  Cardiovascular:   Hypertension    Musculoskeletal:  Spine Disorders: lumbar Chronic Pain    Neurological:   Neuromuscular Disease, Headaches        Physical Exam  General: Well nourished, Cooperative, Alert and Oriented    Airway:  Mallampati: II   Mouth Opening: Normal  TM Distance: Normal  Tongue: Normal  Neck ROM: Normal ROM        Anesthesia Plan  Type of Anesthesia, risks & benefits discussed:    Anesthesia Type: Gen Natural Airway  Intra-op Monitoring Plan: Standard ASA Monitors  Post Op Pain Control Plan: multimodal analgesia  Induction:  IV  Airway Plan: , Awake  Informed Consent: Informed consent signed with the Patient and all parties understand the risks and agree with anesthesia plan.  All questions answered. Patient consented to blood products? Yes  ASA Score: 3  Day of Surgery Review of History & Physical: H&P Update referred to the surgeon/provider.    Ready For Surgery From Anesthesia Perspective.     .

## 2023-05-12 NOTE — TRANSFER OF CARE
"Anesthesia Transfer of Care Note    Patient: Humera Guerrero    Procedure(s) Performed: Procedure(s) (LRB):  Bilateral L4-5,5-S1 MBB (Bilateral)    Patient location: Other: [facility]    Anesthesia Type: general    Transport from OR: Transported from OR on room air with adequate spontaneous ventilation    Post pain: adequate analgesia    Post assessment: no apparent anesthetic complications    Post vital signs: stable    Level of consciousness: sedated and responds to stimulation    Nausea/Vomiting: no nausea/vomiting    Complications: none    Transfer of care protocol was followedComments: Good SV continue, NAD noted, VSS, RTRN      Last vitals:   Visit Vitals  BP 99/61 (BP Location: Right arm, Patient Position: Lying)   Pulse 84   Temp 37 °C (98.6 °F) (Oral)   Resp 18   Ht 5' 6" (1.676 m)   Wt 123.8 kg (273 lb)   LMP  (LMP Unknown)   SpO2 99%   BMI 44.06 kg/m²     "

## 2023-05-12 NOTE — ANESTHESIA RELEASE NOTE
"Anesthesia Release from PACU Note    Patient: Humera Guerrero    Procedure(s) Performed: Procedure(s) (LRB):  Bilateral L4-5,5-S1 MBB (Bilateral)    Anesthesia type: general    Post pain: Adequate analgesia    Post assessment: no apparent anesthetic complications    Last Vitals:   Visit Vitals  BP 99/61 (BP Location: Right arm, Patient Position: Lying)   Pulse 84   Temp 37 °C (98.6 °F) (Oral)   Resp 18   Ht 5' 6" (1.676 m)   Wt 123.8 kg (273 lb)   LMP  (LMP Unknown)   SpO2 99%   BMI 44.06 kg/m²       Post vital signs: stable    Level of consciousness: awake    Nausea/Vomiting: no nausea/no vomiting    Complications: none    Airway Patency: patent    Respiratory: unassisted    Cardiovascular: stable and blood pressure at baseline    Hydration: euvolemic  "

## 2023-05-15 LAB
6-ACETYLMORPHINE, URINE (RUSH): NEGATIVE 10 NG/ML
7-AMINOCLONAZEPAM, URINE (RUSH): NEGATIVE 25 NG/ML
A-HYDROXYALPRAZOLAM, URINE (RUSH): NEGATIVE 25 NG/ML
ACETYL FENTANYL, URINE (RUSH): NEGATIVE 2.5 NG/ML
ACETYL NORFENTANYL OXALATE, URINE (RUSH): NEGATIVE 5 NG/ML
AMPHET UR QL SCN: NEGATIVE
BENZOYLECGONINE, URINE (RUSH): NEGATIVE 100 NG/ML
BUPRENORPHINE UR QL SCN: NEGATIVE 25 NG/ML
CODEINE, URINE (RUSH): NEGATIVE 25 NG/ML
CREAT UR-MCNC: 29 MG/DL (ref 28–219)
EDDP, URINE (RUSH): NEGATIVE 25 NG/ML
FENTANYL, URINE (RUSH): NEGATIVE 2.5 NG/ML
HYDROCODONE, URINE (RUSH): NEGATIVE 25 NG/ML
HYDROMORPHONE, URINE (RUSH): NEGATIVE 25 NG/ML
LORAZEPAM, URINE (RUSH): NEGATIVE 25 NG/ML
METHADONE UR QL SCN: NEGATIVE 25 NG/ML
METHAMPHET UR QL SCN: NEGATIVE
MORPHINE, URINE (RUSH): NEGATIVE 25 NG/ML
NORBUPRENORPHINE, URINE (RUSH): NEGATIVE 25 NG/ML
NORDIAZEPAM, URINE (RUSH): NEGATIVE 25 NG/ML
NORFENTANYL OXALATE, URINE (RUSH): NEGATIVE 5 NG/ML
NORHYDROCODONE, URINE (RUSH): NEGATIVE 50 NG/ML
NOROXYCODONE HCL, URINE (RUSH): NEGATIVE 50 NG/ML
OXAZEPAM, URINE (RUSH): NEGATIVE 25 NG/ML
OXYCODONE UR QL SCN: NEGATIVE 25 NG/ML
OXYMORPHONE, URINE (RUSH): NEGATIVE 25 NG/ML
PH UR STRIP: 6 PH UNITS
SP GR UR STRIP: 1.01
TAPENTADOL, URINE (RUSH): NEGATIVE 25 NG/ML
TEMAZEPAM, URINE (RUSH): NEGATIVE 25 NG/ML
THC-COOH, URINE (RUSH): NEGATIVE 25 NG/ML
TRAMADOL, URINE (RUSH): NEGATIVE 100 NG/ML

## 2023-05-26 ENCOUNTER — OFFICE VISIT (OUTPATIENT)
Dept: PAIN MEDICINE | Facility: CLINIC | Age: 43
End: 2023-05-26
Payer: COMMERCIAL

## 2023-05-26 VITALS
RESPIRATION RATE: 18 BRPM | BODY MASS INDEX: 44.36 KG/M2 | WEIGHT: 276 LBS | SYSTOLIC BLOOD PRESSURE: 126 MMHG | HEIGHT: 66 IN | HEART RATE: 74 BPM | DIASTOLIC BLOOD PRESSURE: 73 MMHG

## 2023-05-26 DIAGNOSIS — M47.817 SPONDYLOSIS OF LUMBOSACRAL REGION WITHOUT MYELOPATHY OR RADICULOPATHY: Primary | Chronic | ICD-10-CM

## 2023-05-26 DIAGNOSIS — G89.29 CHRONIC BILATERAL LOW BACK PAIN WITHOUT SCIATICA: Chronic | ICD-10-CM

## 2023-05-26 DIAGNOSIS — M54.50 CHRONIC BILATERAL LOW BACK PAIN WITHOUT SCIATICA: Chronic | ICD-10-CM

## 2023-05-26 PROCEDURE — 99212 OFFICE O/P EST SF 10 MIN: CPT | Mod: S$PBB,,, | Performed by: PAIN MEDICINE

## 2023-05-26 PROCEDURE — 3074F PR MOST RECENT SYSTOLIC BLOOD PRESSURE < 130 MM HG: ICD-10-PCS | Mod: ,,, | Performed by: PAIN MEDICINE

## 2023-05-26 PROCEDURE — 99214 OFFICE O/P EST MOD 30 MIN: CPT | Mod: PBBFAC | Performed by: PAIN MEDICINE

## 2023-05-26 PROCEDURE — 3008F BODY MASS INDEX DOCD: CPT | Mod: ,,, | Performed by: PAIN MEDICINE

## 2023-05-26 PROCEDURE — 3008F PR BODY MASS INDEX (BMI) DOCUMENTED: ICD-10-PCS | Mod: ,,, | Performed by: PAIN MEDICINE

## 2023-05-26 PROCEDURE — 3078F PR MOST RECENT DIASTOLIC BLOOD PRESSURE < 80 MM HG: ICD-10-PCS | Mod: ,,, | Performed by: PAIN MEDICINE

## 2023-05-26 PROCEDURE — 1159F MED LIST DOCD IN RCRD: CPT | Mod: ,,, | Performed by: PAIN MEDICINE

## 2023-05-26 PROCEDURE — 4010F PR ACE/ARB THEARPY RXD/TAKEN: ICD-10-PCS | Mod: ,,, | Performed by: PAIN MEDICINE

## 2023-05-26 PROCEDURE — 3078F DIAST BP <80 MM HG: CPT | Mod: ,,, | Performed by: PAIN MEDICINE

## 2023-05-26 PROCEDURE — 99212 PR OFFICE/OUTPT VISIT, EST, LEVL II, 10-19 MIN: ICD-10-PCS | Mod: S$PBB,,, | Performed by: PAIN MEDICINE

## 2023-05-26 PROCEDURE — 1159F PR MEDICATION LIST DOCUMENTED IN MEDICAL RECORD: ICD-10-PCS | Mod: ,,, | Performed by: PAIN MEDICINE

## 2023-05-26 PROCEDURE — 4010F ACE/ARB THERAPY RXD/TAKEN: CPT | Mod: ,,, | Performed by: PAIN MEDICINE

## 2023-05-26 PROCEDURE — 3074F SYST BP LT 130 MM HG: CPT | Mod: ,,, | Performed by: PAIN MEDICINE

## 2023-05-26 RX ORDER — COLCHICINE 0.6 MG/1
TABLET ORAL
Qty: 30 TABLET | Refills: 0 | Status: SHIPPED | OUTPATIENT
Start: 2023-05-26 | End: 2023-06-19

## 2023-05-26 NOTE — LETTER
"Humera Guerrero (SHARONICA) was seen and treated in our department on 5/26/2023.  She may return to work on 5/26/23.    If you have any questions or concerns, please don't hesitate to call.      "

## 2023-05-26 NOTE — PROGRESS NOTES
She Disclaimer: This note has been generated using voice-recognition software. There may be typographical errors that have been missed during proof-reading        Patient ID: Humera Guerrero is a 42 y.o. female.      Chief Complaint: No chief complaint on file.      42-year-old female returns for re-evaluation following bilateral lumbar medial branch block # 1 May 12, 2023.  Patient reports 90% pain relief and ongoing.  Her pain is only noticeable with left lateral rotation and flexion.  The pain remains primarily left lower back.  She denies radicular symptoms to the lower extremities. She does not feel that repeat injections are indicated this time and will return in 3 4 weeks for re-evaluation.          Pain Assessment  Pain Assessment: 0-10  Pain Score:   2  Pain Location: Back  Pain Orientation: Lower  Pain Descriptors: Burning, Other (Comment) (Stiffness)  Pain Frequency: Intermittent  Pain Onset: On-going  Clinical Progression: Gradually improving  Aggravating Factors: Standing (Sitting)  Pain Intervention(s): Home medication, Rest, Heat applied      A's of Opioid Risk Assessment  Activity:Patient can perform ADL.   Analgesia:  None  Adverse Effects: Patient denies constipation or sedation.  Aberrant Behavior:  reviewed with no aberrant drug seeking/taking behavior.      Patient denies any suicidal or homicidal ideations    Physical Therapy/Home Exercise: yes      FL Fluoro for Pain Management  See OP Notes for results.     IMPRESSION: See OP Notes for results.     This procedure was auto-finalized by: Virtual Radiologist      Review of Systems   Constitutional: Negative.    HENT: Negative.     Eyes: Negative.    Respiratory: Negative.     Cardiovascular: Negative.    Gastrointestinal: Negative.    Endocrine: Negative.    Genitourinary: Negative.    Musculoskeletal:  Positive for back pain.   Integumentary:  Negative.   Neurological: Negative.    Hematological: Negative.    Psychiatric/Behavioral:  Negative.             Past Medical History:   Diagnosis Date    Cerebral cyst     Other hyperlipidemia     Prediabetes     Vitamin D insufficiency      Past Surgical History:   Procedure Laterality Date     SECTION      HYSTERECTOMY Bilateral     INJECTION OF ANESTHETIC AGENT AROUND MEDIAL BRANCH NERVES INNERVATING LUMBAR FACET JOINT Bilateral 2023    Procedure: Bilateral L4-5,5-S1 MBB;  Surgeon: Carri Sanchez MD;  [facility]: [facility];  Service: Pain Management;  Laterality: Bilateral;    TUBAL LIGATION       Social History     Socioeconomic History    Marital status:     Number of children: 3    Highest education level: Master's degree (e.g., MA, MS, Maurice, MEd, MSW, LILIANA)   Occupational History     Comment:  and therapist   Tobacco Use    Smoking status: Former     Types: Cigars     Passive exposure: Past    Smokeless tobacco: Never   Substance and Sexual Activity    Alcohol use: Yes     Comment: 1 mixed drink ocassionally    Drug use: Never    Sexual activity: Yes   Social History Narrative    Lives with  2 daughters and 1 adult son    No Pets.    No smoking in the home.     Family History   Problem Relation Age of Onset    Other Mother     Hypertension Mother     Hypertension Maternal Grandmother     Breast cancer Paternal Grandmother     Cancer Paternal Grandmother      Review of patient's allergies indicates:  No Known Allergies  has a current medication list which includes the following prescription(s): atorvastatin, colchicine, estradiol, gabapentin, hydroxyzine pamoate, ibuprofen, losartan, and sertraline.      Objective:  Vitals:    23 0809   BP: 126/73   Pulse: 74   Resp: 18        Physical Exam  Vitals and nursing note reviewed.   Constitutional:       General: She is not in acute distress.     Appearance: Normal appearance. She is not ill-appearing, toxic-appearing or diaphoretic.   HENT:      Head: Normocephalic and atraumatic.      Nose: Nose  normal.      Mouth/Throat:      Mouth: Mucous membranes are moist.   Eyes:      Extraocular Movements: Extraocular movements intact.      Pupils: Pupils are equal, round, and reactive to light.   Cardiovascular:      Rate and Rhythm: Normal rate and regular rhythm.      Heart sounds: Normal heart sounds.   Pulmonary:      Effort: Pulmonary effort is normal. No respiratory distress.      Breath sounds: Normal breath sounds. No stridor. No wheezing or rhonchi.   Abdominal:      General: Bowel sounds are normal.      Palpations: Abdomen is soft.   Musculoskeletal:         General: No swelling or deformity. Normal range of motion.      Cervical back: Normal and normal range of motion. No spasms or tenderness. No pain with movement. Normal range of motion.      Thoracic back: Normal.      Lumbar back: Tenderness present. No spasms or bony tenderness. Normal range of motion. Negative right straight leg raise test and negative left straight leg raise test. No scoliosis.      Right lower leg: No edema.      Left lower leg: No edema.   Skin:     General: Skin is warm.   Neurological:      General: No focal deficit present.      Mental Status: She is alert and oriented to person, place, and time. Mental status is at baseline.      Cranial Nerves: No cranial nerve deficit.      Sensory: Sensation is intact. No sensory deficit.      Motor: No weakness.      Coordination: Coordination normal.      Gait: Gait normal.      Deep Tendon Reflexes: Reflexes are normal and symmetric.   Psychiatric:         Mood and Affect: Mood normal.         Behavior: Behavior normal.         Assessment:      1. Spondylosis of lumbosacral region without myelopathy or radiculopathy    2. Chronic bilateral low back pain without sciatica          Plan:  1. reviewed  2. Patient to return in 3-4 weeks for re-evaluation  3. Consider lumbar medial branch block # 2 for lower back pain spondylosis if indicated     report:  Reviewed and consistent with  medication use as prescribed.      The total time spent for evaluation and management on 05/26/2023 including reviewing separately obtained history, performing a medically appropriate exam and evaluation, documenting clinical information in the health record, independently interpreting results and communicating them to the patient/family/caregiver, and ordering medications/tests/procedures was between 15-29 minutes.    The above plan and management options were discussed at length with patient. Patient is in agreement with the above and verbalized understanding. It will be communicated with the referring physician via electronic record, fax, or mail.

## 2023-06-19 ENCOUNTER — OFFICE VISIT (OUTPATIENT)
Dept: PRIMARY CARE CLINIC | Facility: CLINIC | Age: 43
End: 2023-06-19
Payer: COMMERCIAL

## 2023-06-19 ENCOUNTER — OFFICE VISIT (OUTPATIENT)
Dept: PAIN MEDICINE | Facility: CLINIC | Age: 43
End: 2023-06-19
Payer: COMMERCIAL

## 2023-06-19 VITALS
RESPIRATION RATE: 18 BRPM | BODY MASS INDEX: 44.36 KG/M2 | DIASTOLIC BLOOD PRESSURE: 78 MMHG | SYSTOLIC BLOOD PRESSURE: 137 MMHG | HEART RATE: 72 BPM | HEIGHT: 66 IN | TEMPERATURE: 98 F | WEIGHT: 276 LBS | OXYGEN SATURATION: 97 %

## 2023-06-19 VITALS
HEIGHT: 66 IN | DIASTOLIC BLOOD PRESSURE: 79 MMHG | BODY MASS INDEX: 44.46 KG/M2 | WEIGHT: 276.63 LBS | RESPIRATION RATE: 18 BRPM | HEART RATE: 79 BPM | SYSTOLIC BLOOD PRESSURE: 143 MMHG

## 2023-06-19 DIAGNOSIS — G89.29 CHRONIC BILATERAL LOW BACK PAIN WITHOUT SCIATICA: Chronic | ICD-10-CM

## 2023-06-19 DIAGNOSIS — M47.817 SPONDYLOSIS OF LUMBOSACRAL REGION WITHOUT MYELOPATHY OR RADICULOPATHY: Primary | Chronic | ICD-10-CM
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
## 2023-06-19 DIAGNOSIS — M54.42 CHRONIC LEFT-SIDED LOW BACK PAIN WITH LEFT-SIDED SCIATICA: ICD-10-CM

## 2023-06-19 DIAGNOSIS — M54.50 CHRONIC BILATERAL LOW BACK PAIN WITHOUT SCIATICA: Chronic | ICD-10-CM

## 2023-06-19 DIAGNOSIS — G89.29 CHRONIC LEFT-SIDED LOW BACK PAIN WITH LEFT-SIDED SCIATICA: ICD-10-CM

## 2023-06-19 PROCEDURE — 4010F PR ACE/ARB THEARPY RXD/TAKEN: ICD-10-PCS | Mod: ,,, | Performed by: PAIN MEDICINE

## 2023-06-19 PROCEDURE — 3008F PR BODY MASS INDEX (BMI) DOCUMENTED: ICD-10-PCS | Mod: ,,, | Performed by: PAIN MEDICINE

## 2023-06-19 PROCEDURE — 3008F BODY MASS INDEX DOCD: CPT | Mod: ,,, | Performed by: PAIN MEDICINE

## 2023-06-19 PROCEDURE — 99214 PR OFFICE/OUTPT VISIT, EST, LEVL IV, 30-39 MIN: ICD-10-PCS | Mod: S$PBB,,, | Performed by: PAIN MEDICINE

## 2023-06-19 PROCEDURE — 99214 OFFICE O/P EST MOD 30 MIN: CPT | Mod: S$PBB,,, | Performed by: PAIN MEDICINE

## 2023-06-19 PROCEDURE — 3078F PR MOST RECENT DIASTOLIC BLOOD PRESSURE < 80 MM HG: ICD-10-PCS | Mod: ,,, | Performed by: PAIN MEDICINE

## 2023-06-19 PROCEDURE — 4010F ACE/ARB THERAPY RXD/TAKEN: CPT | Mod: ,,, | Performed by: PAIN MEDICINE

## 2023-06-19 PROCEDURE — 3077F PR MOST RECENT SYSTOLIC BLOOD PRESSURE >= 140 MM HG: ICD-10-PCS | Mod: ,,, | Performed by: PAIN MEDICINE

## 2023-06-19 PROCEDURE — 99215 OFFICE O/P EST HI 40 MIN: CPT | Mod: PBBFAC | Performed by: PAIN MEDICINE

## 2023-06-19 PROCEDURE — 99213 OFFICE O/P EST LOW 20 MIN: CPT | Mod: ,,, | Performed by: NURSE PRACTITIONER

## 2023-06-19 PROCEDURE — 3078F DIAST BP <80 MM HG: CPT | Mod: ,,, | Performed by: PAIN MEDICINE

## 2023-06-19 PROCEDURE — 3077F SYST BP >= 140 MM HG: CPT | Mod: ,,, | Performed by: PAIN MEDICINE

## 2023-06-19 PROCEDURE — 99213 PR OFFICE/OUTPT VISIT, EST, LEVL III, 20-29 MIN: ICD-10-PCS | Mod: ,,, | Performed by: NURSE PRACTITIONER

## 2023-06-19 RX ORDER — COLCHICINE 0.6 MG/1
TABLET ORAL
Qty: 30 TABLET | Refills: 0 | Status: SHIPPED | OUTPATIENT
Start: 2023-06-19 | End: 2023-07-05

## 2023-06-19 NOTE — PROGRESS NOTES
She Disclaimer: This note has been generated using voice-recognition software. There may be typographical errors that have been missed during proof-reading        Patient ID: Humera Guerrero is a 42 y.o. female.      Chief Complaint: Low-back Pain      42-year-old female returns for re-evaluation of recurrent lower back pain.  The pain is worse with bending and gardening.  She has been without gabapentin for greater than 2 weeks and has definitely experienced more noticeable pain.  She received lumbar medial branch block # 1,  May 12, 2023 and experienced 90% pain relief for several weeks.  She denies radicular symptoms to the lower extremities and returns today to discuss lumbar medial branch block #2  for lower back pain and spondylosis.  MRI revealed multilevel degenerative changes, disc bulges and multilevel facet hypertrophy and ligamentum flavum hypertrophy  from L3-S1.              Pain Assessment  Pain Assessment: 0-10  Pain Score:   4  Pain Location: Back (lower back pain)  Pain Descriptors: Dull  Pain Frequency: Intermittent  Pain Onset: Gradual  Clinical Progression: Gradually worsening  Aggravating Factors: Bending, Standing  Pain Intervention(s): Medication (See eMAR), Home medication      A's of Opioid Risk Assessment  Activity:Patient can perform ADL.   Analgesia:  None  Adverse Effects: Patient denies constipation or sedation.  Aberrant Behavior:  reviewed with no aberrant drug seeking/taking behavior.      Patient denies any suicidal or homicidal ideations    Physical Therapy/Home Exercise: yes      FL Fluoro for Pain Management  See OP Notes for results.     IMPRESSION: See OP Notes for results.     This procedure was auto-finalized by: Virtual Radiologist      Review of Systems   Constitutional: Negative.    HENT: Negative.     Eyes: Negative.    Respiratory: Negative.     Cardiovascular: Negative.    Gastrointestinal: Negative.    Endocrine: Negative.    Genitourinary: Negative.     Musculoskeletal:  Positive for back pain.   Integumentary:  Negative.   Neurological: Negative.    Hematological: Negative.    Psychiatric/Behavioral: Negative.             Past Medical History:   Diagnosis Date    Cerebral cyst     Other hyperlipidemia     Prediabetes     Vitamin D insufficiency      Past Surgical History:   Procedure Laterality Date     SECTION      HYSTERECTOMY Bilateral     INJECTION OF ANESTHETIC AGENT AROUND MEDIAL BRANCH NERVES INNERVATING LUMBAR FACET JOINT Bilateral 2023    Procedure: Bilateral L4-5,5-S1 MBB;  Surgeon: Carri Sanchez MD;  Location: [facility];  Service: Pain Management;  Laterality: Bilateral;    TUBAL LIGATION       Social History     Socioeconomic History    Marital status:     Number of children: 3    Highest education level: Master's degree (e.g., MA, MS, Maurice, MEd, MSW, LILIANA)   Occupational History     Comment:  and therapist   Tobacco Use    Smoking status: Former     Types: Cigars     Passive exposure: Past    Smokeless tobacco: Never   Substance and Sexual Activity    Alcohol use: Yes     Comment: 1 mixed drink ocassionally    Drug use: Never    Sexual activity: Yes   Social History Narrative    Lives with  2 daughters and 1 adult son    No Pets.    No smoking in the home.     Family History   Problem Relation Age of Onset    Other Mother     Hypertension Mother     Hypertension Maternal Grandmother     Breast cancer Paternal Grandmother     Cancer Paternal Grandmother      Review of patient's allergies indicates:  No Known Allergies  has a current medication list which includes the following prescription(s): atorvastatin, estradiol, hydroxyzine pamoate, ibuprofen, losartan, sertraline, colchicine, and gabapentin.      Objective:  Vitals:    23 0909   BP: (!) 143/79   Pulse: 79   Resp: 18        Physical Exam  Vitals and nursing note reviewed.   Constitutional:       General: She is not in acute distress.      Appearance: Normal appearance. She is not ill-appearing, toxic-appearing or diaphoretic.   HENT:      Head: Normocephalic and atraumatic.      Nose: Nose normal.      Mouth/Throat:      Mouth: Mucous membranes are moist.   Eyes:      Extraocular Movements: Extraocular movements intact.      Pupils: Pupils are equal, round, and reactive to light.   Cardiovascular:      Rate and Rhythm: Normal rate and regular rhythm.      Heart sounds: Normal heart sounds.   Pulmonary:      Effort: Pulmonary effort is normal. No respiratory distress.      Breath sounds: Normal breath sounds. No stridor. No wheezing or rhonchi.   Abdominal:      General: Bowel sounds are normal.      Palpations: Abdomen is soft.   Musculoskeletal:         General: No swelling or deformity. Normal range of motion.      Cervical back: Normal and normal range of motion. No spasms or tenderness. No pain with movement. Normal range of motion.      Thoracic back: Normal.      Lumbar back: Tenderness present. No spasms or bony tenderness. Normal range of motion. Negative right straight leg raise test and negative left straight leg raise test. No scoliosis.      Right lower leg: No edema.      Left lower leg: No edema.   Skin:     General: Skin is warm.   Neurological:      General: No focal deficit present.      Mental Status: She is alert and oriented to person, place, and time. Mental status is at baseline.      Cranial Nerves: No cranial nerve deficit.      Sensory: Sensation is intact. No sensory deficit.      Motor: No weakness.      Coordination: Coordination normal.      Gait: Gait normal.      Deep Tendon Reflexes: Reflexes are normal and symmetric.   Psychiatric:         Mood and Affect: Mood normal.         Behavior: Behavior normal.         Assessment:      1. Spondylosis of lumbosacral region without myelopathy or radiculopathy    2. Chronic bilateral low back pain without sciatica          Plan:  1. reviewed    2.Indication: The patient has  clinical and radiologic findings suggestive of facet mediated pain and is s/p 1st diagnostic bilateral lumbar L4/5 and L5/S1 medial branch blocks with at least 80% relief of their axial back pain lasting the duration of the local anesthetic    3.Indications for this procedure for this specific patient include the following   - Pt has had symptoms for three months with moderate to severe pain with functional impairment rated of 7/10 pain.   - Pain non-responsive to conservative care.    - Pain predominately axial and not associated with radiculopathy or claudication.    - No non-facet pathology as source of pain.    - Clinical assessment implicates facet joint as putative pain source.    - Pain is exacerbated by extension or prolonged sitting/standing and relieved by rest.    - No unexplained neurologic deficit.    - No history of coagulopathy, infection or unstable medical conditions.    - Pain is causing significant functional limitation resulting in diminished quality of life and impaired age appropriate ADL's.   - Clinical assessment implicates facet joint as putative source of pain  - Repeat injections not done prior to 7 days   - no more than 2 levels will be done per side      4.Monitored Anesthesia Care medical necessity authorization request:    Monitor anesthesia request is medically indicated for the scheduled nerve block procedure due to:  - needle phobia and anxiety, placing  the patient at risk during the provided service.  -patient has a BMI greater than 40  -patient has an ASA class greater than 3 and requires constant presence of an anesthesiologist during the procedure:  -patient has severe problems with muscles and muscle spasticity that makes it hard to lie still  -patient suffers from chronic pain and is unable to function due to  diminished ADLs    5.The planned medically necessary  surgical procedure is performed in a hospital outpatient department and not in an ambulatory surgical center due  to:     -there is no geographically assessable ambulatory surgery center that has the  necessary equipment and fluoroscopy needed for the procedure     -there is no geographically assessable ambulatory surgical center available at which the physician has privileges     -an ASC's  specific  guideline regarding the individuals weight or health conditions that prevent the use of an ASC         report:  Reviewed and consistent with medication use as prescribed.      The total time spent for evaluation and management on 06/20/2023 including reviewing separately obtained history, performing a medically appropriate exam and evaluation, documenting clinical information in the health record, independently interpreting results and communicating them to the patient/family/caregiver, and ordering medications/tests/procedures was between 15-29 minutes.    The above plan and management options were discussed at length with patient. Patient is in agreement with the above and verbalized understanding. It will be communicated with the referring physician via electronic record, fax, or mail.

## 2023-06-19 NOTE — PATIENT INSTRUCTIONS
Your BILATERAL L4-5, L5-S1 MBB is scheduled on 07/11/2023 at 2:30 pm        Procedure Instructions:    Nothing to eat or drink for 8 hours or after midnight including gum, candy, mints, or tobacco products.  If you are scheduled for 1:30 or later nothing to eat or drink after 5 a.m. the morning of the procedure, including gum, candy, mints, or tobacco products.  Must have a  at least 18 yrs of age to stay present at all times  No Diabetic medications the morning of procedure, check blood sugar the morning of procedure, if it is greater than 200 call the office at [phone]  If you are started on antibiotics or have been prescribed antibiotics, have a fever, or have any other type of infection call to reschedule procedure.  If you take blood pressure medications you can take it at your regular scheduled time with a small sip of WATER!  Dentures are to be removed prior to procedure or we can provide you with a denture cup to remove them prior to being taken back for procedure.   False eyelashes are to be removed before the morning of procedure.    Contacts will have to be removed prior to procedure.  No jewelry is to be worn to the procedure.     HOLD ASPIRIN AND ASPIRIN PRODUCTS  (ASPIRIN, BC POWDER ETC. ) FOR 7 DAYS  PRIOR TO PROCEDURE  HOLD NSAIDS( ibuprofen, mobic, meloxicam, advil, diclofenac, naproxen, relafen, celebrex,  methotrexate, aleve etc....)  FOR 3 DAYS   PRIOR TO PROCEDURE

## 2023-06-19 NOTE — H&P (VIEW-ONLY)
She Disclaimer: This note has been generated using voice-recognition software. There may be typographical errors that have been missed during proof-reading        Patient ID: Humera Guerrero is a 42 y.o. female.      Chief Complaint: Low-back Pain      42-year-old female returns for re-evaluation of recurrent lower back pain.  The pain is worse with bending and gardening.  She has been without gabapentin for greater than 2 weeks and has definitely experienced more noticeable pain.  She received lumbar medial branch block # 1,  May 12, 2023 and experienced 90% pain relief for several weeks.  She denies radicular symptoms to the lower extremities and returns today to discuss lumbar medial branch block #2  for lower back pain and spondylosis.  MRI revealed multilevel degenerative changes, disc bulges and multilevel facet hypertrophy and ligamentum flavum hypertrophy  from L3-S1.              Pain Assessment  Pain Assessment: 0-10  Pain Score:   4  Pain Location: Back (lower back pain)  Pain Descriptors: Dull  Pain Frequency: Intermittent  Pain Onset: Gradual  Clinical Progression: Gradually worsening  Aggravating Factors: Bending, Standing  Pain Intervention(s): Medication (See eMAR), Home medication      A's of Opioid Risk Assessment  Activity:Patient can perform ADL.   Analgesia:  None  Adverse Effects: Patient denies constipation or sedation.  Aberrant Behavior:  reviewed with no aberrant drug seeking/taking behavior.      Patient denies any suicidal or homicidal ideations    Physical Therapy/Home Exercise: yes      [place] [facility] for Pain Management  See OP Notes for results.     IMPRESSION: See OP Notes for results.     This procedure was auto-finalized by: Virtual Radiologist      Review of Systems   Constitutional: Negative.    HENT: Negative.     Eyes: Negative.    Respiratory: Negative.     Cardiovascular: Negative.    Gastrointestinal: Negative.    Endocrine: Negative.    Genitourinary: Negative.     Musculoskeletal:  Positive for back pain.   Integumentary:  Negative.   Neurological: Negative.    Hematological: Negative.    Psychiatric/Behavioral: Negative.             Past Medical History:   Diagnosis Date    Cerebral cyst     Other hyperlipidemia     Prediabetes     Vitamin D insufficiency      Past Surgical History:   Procedure Laterality Date     SECTION      HYSTERECTOMY Bilateral     INJECTION OF ANESTHETIC AGENT AROUND MEDIAL BRANCH NERVES INNERVATING LUMBAR FACET JOINT Bilateral 2023    Procedure: Bilateral L4-5,5-S1 MBB;  Surgeon: Carri Sanchez MD;  Location: [facility];  Service: Pain Management;  Laterality: Bilateral;    TUBAL LIGATION       Social History     Socioeconomic History    Marital status:     Number of children: 3    Highest education level: Master's degree (e.g., MA, MS, Maurice, MEd, MSW, LILIANA)   Occupational History     Comment:  and therapist   Tobacco Use    Smoking status: Former     Types: Cigars     Passive exposure: Past    Smokeless tobacco: Never   Substance and Sexual Activity    Alcohol use: Yes     Comment: 1 mixed drink ocassionally    Drug use: Never    Sexual activity: Yes   Social History Narrative    Lives with  2 daughters and 1 adult son    No Pets.    No smoking in the home.     Family History   Problem Relation Age of Onset    Other Mother     Hypertension Mother     Hypertension Maternal Grandmother     Breast cancer Paternal Grandmother     Cancer Paternal Grandmother      Review of patient's allergies indicates:  No Known Allergies  has a current medication list which includes the following prescription(s): atorvastatin, estradiol, hydroxyzine pamoate, ibuprofen, losartan, sertraline, colchicine, and gabapentin.      Objective:  Vitals:    23 0909   BP: (!) 143/79   Pulse: 79   Resp: 18        Physical Exam  Vitals and nursing note reviewed.   Constitutional:       General: She is not in acute distress.      Appearance: Normal appearance. She is not ill-appearing, toxic-appearing or diaphoretic.   HENT:      Head: Normocephalic and atraumatic.      Nose: Nose normal.      Mouth/Throat:      Mouth: Mucous membranes are moist.   Eyes:      Extraocular Movements: Extraocular movements intact.      Pupils: Pupils are equal, round, and reactive to light.   Cardiovascular:      Rate and Rhythm: Normal rate and regular rhythm.      Heart sounds: Normal heart sounds.   Pulmonary:      Effort: Pulmonary effort is normal. No respiratory distress.      Breath sounds: Normal breath sounds. No stridor. No wheezing or rhonchi.   Abdominal:      General: Bowel sounds are normal.      Palpations: Abdomen is soft.   Musculoskeletal:         General: No swelling or deformity. Normal range of motion.      Cervical back: Normal and normal range of motion. No spasms or tenderness. No pain with movement. Normal range of motion.      Thoracic back: Normal.      Lumbar back: Tenderness present. No spasms or bony tenderness. Normal range of motion. Negative right straight leg raise test and negative left straight leg raise test. No scoliosis.      Right lower leg: No edema.      Left lower leg: No edema.   Skin:     General: Skin is warm.   Neurological:      General: No focal deficit present.      Mental Status: She is alert and oriented to person, place, and time. Mental status is at baseline.      Cranial Nerves: No cranial nerve deficit.      Sensory: Sensation is intact. No sensory deficit.      Motor: No weakness.      Coordination: Coordination normal.      Gait: Gait normal.      Deep Tendon Reflexes: Reflexes are normal and symmetric.   Psychiatric:         Mood and Affect: Mood normal.         Behavior: Behavior normal.         Assessment:      1. Spondylosis of lumbosacral region without myelopathy or radiculopathy    2. Chronic bilateral low back pain without sciatica          Plan:  1. reviewed    2.Indication: The patient has  clinical and radiologic findings suggestive of facet mediated pain and is s/p 1st diagnostic bilateral lumbar L4/5 and L5/S1 medial branch blocks with at least 80% relief of their axial back pain lasting the duration of the local anesthetic    3.Indications for this procedure for this specific patient include the following   - Pt has had symptoms for three months with moderate to severe pain with functional impairment rated of 7/10 pain.   - Pain non-responsive to conservative care.    - Pain predominately axial and not associated with radiculopathy or claudication.    - No non-facet pathology as source of pain.    - Clinical assessment implicates facet joint as putative pain source.    - Pain is exacerbated by extension or prolonged sitting/standing and relieved by rest.    - No unexplained neurologic deficit.    - No history of coagulopathy, infection or unstable medical conditions.    - Pain is causing significant functional limitation resulting in diminished quality of life and impaired age appropriate ADL's.   - Clinical assessment implicates facet joint as putative source of pain  - Repeat injections not done prior to 7 days   - no more than 2 levels will be done per side      4.Monitored Anesthesia Care medical necessity authorization request:    Monitor anesthesia request is medically indicated for the scheduled nerve block procedure due to:  - needle phobia and anxiety, placing  the patient at risk during the provided service.  -patient has a BMI greater than 40  -patient has an ASA class greater than 3 and requires constant presence of an anesthesiologist during the procedure:  -patient has severe problems with muscles and muscle spasticity that makes it hard to lie still  -patient suffers from chronic pain and is unable to function due to  diminished ADLs    5.The planned medically necessary  surgical procedure is performed in a hospital outpatient department and not in an ambulatory surgical center due  to:     -there is no geographically assessable ambulatory surgery center that has the  necessary equipment and fluoroscopy needed for the procedure     -there is no geographically assessable ambulatory surgical center available at which the physician has privileges     -an ASC's  specific  guideline regarding the individuals weight or health conditions that prevent the use of an ASC         report:  Reviewed and consistent with medication use as prescribed.      The total time spent for evaluation and management on 06/20/2023 including reviewing separately obtained history, performing a medically appropriate exam and evaluation, documenting clinical information in the health record, independently interpreting results and communicating them to the patient/family/caregiver, and ordering medications/tests/procedures was between 15-29 minutes.    The above plan and management options were discussed at length with patient. Patient is in agreement with the above and verbalized understanding. It will be communicated with the referring physician via electronic record, fax, or mail.
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
## 2023-07-05 RX ORDER — COLCHICINE 0.6 MG/1
TABLET ORAL
Qty: 90 TABLET | Refills: 1 | Status: SHIPPED | OUTPATIENT
Start: 2023-07-05

## 2023-07-11 ENCOUNTER — HOSPITAL ENCOUNTER (OUTPATIENT)
Facility: HOSPITAL | Age: 43
Discharge: HOME | End: 2023-07-11
Attending: PAIN MEDICINE | Admitting: PAIN MEDICINE
Payer: COMMERCIAL

## 2023-07-11 ENCOUNTER — ANESTHESIA EVENT (OUTPATIENT)
Dept: PAIN MEDICINE | Facility: HOSPITAL | Age: 43
End: 2023-07-11
Payer: COMMERCIAL

## 2023-07-11 ENCOUNTER — ANESTHESIA (OUTPATIENT)
Dept: PAIN MEDICINE | Facility: HOSPITAL | Age: 43
End: 2023-07-11
Payer: COMMERCIAL

## 2023-07-11 VITALS
SYSTOLIC BLOOD PRESSURE: 164 MMHG | RESPIRATION RATE: 14 BRPM | HEIGHT: 66 IN | TEMPERATURE: 99 F | DIASTOLIC BLOOD PRESSURE: 83 MMHG | BODY MASS INDEX: 43.71 KG/M2 | OXYGEN SATURATION: 100 % | WEIGHT: 272 LBS | HEART RATE: 66 BPM

## 2023-07-11 DIAGNOSIS — M47.817 SPONDYLOSIS OF LUMBOSACRAL REGION WITHOUT MYELOPATHY OR RADICULOPATHY: Primary | ICD-10-CM

## 2023-07-11 PROCEDURE — 63600175 PHARM REV CODE 636 W HCPCS: Performed by: PAIN MEDICINE

## 2023-07-11 PROCEDURE — 25000003 PHARM REV CODE 250: Performed by: PAIN MEDICINE

## 2023-07-11 PROCEDURE — 64493 INJ PARAVERT F JNT L/S 1 LEV: CPT | Mod: 50 | Performed by: PAIN MEDICINE

## 2023-07-11 PROCEDURE — 64493 PR INJ DX/THER AGNT PARAVERT FACET JOINT,IMG GUIDE,LUMBAR/SAC,1ST LVL: ICD-10-PCS | Mod: 50,,, | Performed by: PAIN MEDICINE

## 2023-07-11 PROCEDURE — D9220A PRA ANESTHESIA: Mod: 23,,, | Performed by: NURSE ANESTHETIST, CERTIFIED REGISTERED

## 2023-07-11 PROCEDURE — 64494 INJ PARAVERT F JNT L/S 2 LEV: CPT | Mod: 50 | Performed by: PAIN MEDICINE

## 2023-07-11 PROCEDURE — 63600175 PHARM REV CODE 636 W HCPCS: Performed by: NURSE ANESTHETIST, CERTIFIED REGISTERED

## 2023-07-11 PROCEDURE — 64495 INJ PARAVERT F JNT L/S 3 LEV: CPT | Mod: 50 | Performed by: PAIN MEDICINE

## 2023-07-11 PROCEDURE — D9220A PRA ANESTHESIA: ICD-10-PCS | Mod: 23,,, | Performed by: NURSE ANESTHETIST, CERTIFIED REGISTERED

## 2023-07-11 PROCEDURE — 64494 PR INJ DX/THER AGNT PARAVERT FACET JOINT,IMG GUIDE,LUMBAR/SAC, 2ND LEVEL: ICD-10-PCS | Mod: 50,,, | Performed by: PAIN MEDICINE

## 2023-07-11 PROCEDURE — 64494 INJ PARAVERT F JNT L/S 2 LEV: CPT | Mod: 50,,, | Performed by: PAIN MEDICINE

## 2023-07-11 PROCEDURE — 64493 INJ PARAVERT F JNT L/S 1 LEV: CPT | Mod: 50,,, | Performed by: PAIN MEDICINE

## 2023-07-11 PROCEDURE — 25000003 PHARM REV CODE 250: Performed by: NURSE ANESTHETIST, CERTIFIED REGISTERED

## 2023-07-11 PROCEDURE — 37000008 HC ANESTHESIA 1ST 15 MINUTES: Performed by: PAIN MEDICINE

## 2023-07-11 RX ORDER — SODIUM CHLORIDE 9 MG/ML
INJECTION, SOLUTION INTRAVENOUS CONTINUOUS
Status: DISCONTINUED | OUTPATIENT
Start: 2023-07-11 | End: 2023-07-11 | Stop reason: HOSPADM

## 2023-07-11 RX ORDER — TRIAMCINOLONE ACETONIDE 40 MG/ML
INJECTION, SUSPENSION INTRA-ARTICULAR; INTRAMUSCULAR CODE/TRAUMA/SEDATION MEDICATION
Status: DISCONTINUED | OUTPATIENT
Start: 2023-07-11 | End: 2023-07-11 | Stop reason: HOSPADM

## 2023-07-11 RX ORDER — BUPIVACAINE HYDROCHLORIDE 2.5 MG/ML
INJECTION, SOLUTION INFILTRATION; PERINEURAL CODE/TRAUMA/SEDATION MEDICATION
Status: DISCONTINUED | OUTPATIENT
Start: 2023-07-11 | End: 2023-07-11 | Stop reason: HOSPADM

## 2023-07-11 RX ORDER — PROPOFOL 10 MG/ML
INJECTION, EMULSION INTRAVENOUS
Status: DISCONTINUED | OUTPATIENT
Start: 2023-07-11 | End: 2023-07-11

## 2023-07-11 RX ORDER — LIDOCAINE HYDROCHLORIDE 20 MG/ML
INJECTION, SOLUTION EPIDURAL; INFILTRATION; INTRACAUDAL; PERINEURAL
Status: DISCONTINUED | OUTPATIENT
Start: 2023-07-11 | End: 2023-07-11

## 2023-07-11 RX ADMIN — SODIUM CHLORIDE: 9 INJECTION, SOLUTION INTRAVENOUS at 03:07

## 2023-07-11 RX ADMIN — PROPOFOL 60 MG: 10 INJECTION, EMULSION INTRAVENOUS at 04:07

## 2023-07-11 RX ADMIN — PROPOFOL 80 MG: 10 INJECTION, EMULSION INTRAVENOUS at 03:07

## 2023-07-11 RX ADMIN — PROPOFOL 40 MG: 10 INJECTION, EMULSION INTRAVENOUS at 04:07

## 2023-07-11 RX ADMIN — LIDOCAINE HYDROCHLORIDE 70 MG: 20 INJECTION, SOLUTION INTRAVENOUS at 03:07

## 2023-07-11 RX ADMIN — PROPOFOL 50 MG: 10 INJECTION, EMULSION INTRAVENOUS at 04:07

## 2023-07-11 RX ADMIN — PROPOFOL 30 MG: 10 INJECTION, EMULSION INTRAVENOUS at 03:07

## 2023-07-11 RX ADMIN — PROPOFOL 40 MG: 10 INJECTION, EMULSION INTRAVENOUS at 03:07

## 2023-07-11 NOTE — ANESTHESIA PREPROCEDURE EVALUATION
07/11/2023  Humera Guerrero is a 42 y.o., female.      Pre-op Assessment    I have reviewed the Patient Summary Reports.     I have reviewed the Nursing Notes. I have reviewed the NPO Status.   I have reviewed the Medications.     Review of Systems  Cardiovascular:   Hypertension    Musculoskeletal:  Spine Disorders: lumbar Chronic Pain    Neurological:   Neuromuscular Disease, Headaches    Endocrine:  Morbid Obesity / BMI > 40      Physical Exam  General: Well nourished, Cooperative, Alert and Oriented    Airway:  Mallampati: II   Mouth Opening: Normal  TM Distance: Normal  Tongue: Normal  Neck ROM: Normal ROM        Anesthesia Plan  Type of Anesthesia, risks & benefits discussed:    Anesthesia Type: Gen Natural Airway  Intra-op Monitoring Plan: Standard ASA Monitors  Post Op Pain Control Plan: multimodal analgesia  Induction:  IV  Airway Plan: , Awake  Informed Consent: Informed consent signed with the Patient and all parties understand the risks and agree with anesthesia plan.  All questions answered. Patient consented to blood products? Yes  ASA Score: 3  Day of Surgery Review of History & Physical: H&P Update referred to the surgeon/provider.    Ready For Surgery From Anesthesia Perspective.     .

## 2023-07-11 NOTE — OP NOTE
Procedure Note    Procedure Date: 7/11/2023    Procedure Performed:  Bilateral  Lumbar Facet  Medial Branch Block @ L4-5,5-S1 with Fluoroscopic Guidance    Indications: Patient has failed conservative therapy.      Pre-op diagnosis: Lumbar Spondylosis    Post-op diagnosis: same    Physician: WIN Sanchez MD    Anesthesia: MAC    Estimated Blood Loss: Less than 1cc    IVF: Per Anesthesia    Complications: None    Technique:  The patient was interviewed in the holding area and Risks/Benefits were discussed, including but not limited to  the possibility of new or different pain, bleeding or infection.   All questions were answered.  The patient understood and accepted risks.  Consent was reviewed and signed.  A time out was taken to identify the patient, procedure and side of the procedure. The patient was placed in a prone position, then prepped and draped in the usual sterile fashion using ChloraPrep and sterile towels.  The levels were determined under fluoroscopic guidance and then marked.  1% Lidocaine was given by raising a skin wheal at the skin over each site and then infiltrated.  A 22-gauge 3.5 inch needle was introduced to the anatomic location of the bilateral L4-5,5-S1 medial branch nerves.  Then, after negative aspiration, 1 cc from a  mixture of Bupivacaine 0.25%  cc  and  40mg kenalog ) was injected @ each level.The patient tolerated the procedure well and was transferred to the [facility] in stable condition.  The patient was monitored after the procedure.  Patient was given post procedure and discharge instructions to follow at home.  The patient was discharged in a stable condition with an adult .

## 2023-07-11 NOTE — DISCHARGE SUMMARY
[facility] - Pain Management  Discharge Note  Short Stay    Procedure(s) (LRB):  Bilateral L4-5, 5-S1 medial branch block #2 (Bilateral)      OUTCOME: Patient tolerated treatment/procedure well without complication and is now ready for discharge.    DISPOSITION: Home or Self Care    FINAL DIAGNOSIS:  Lumbosacral spondylosis    FOLLOWUP: In clinic    DISCHARGE INSTRUCTIONS:  See nurse's notes     TIME SPENT ON DISCHARGE: 5 minutes

## 2023-07-11 NOTE — BRIEF OP NOTE
Discharge Note  Short Stay    Admit Date: 7/11/2023    Discharge Date: 7/11/2023    Attending Physician: Carri Sanchez     Discharge Provider: Carri Sanchez    Diagnosis:  Lumbosacral spondylosis    Discharged Condition: Good    Final Diagnoses: Spondylosis of lumbosacral region without myelopathy or radiculopathy [M47.817]    Disposition: Home or Self Care    Hospital Course: No complications, uneventful    Outcome of Hospitalization, Treatment, Procedure, or Surgery:  Patient was admitted for outpatient interventional pain management procedure. The patient tolerated the procedure well with no complications.    Follow up/Patient Instructions:  Follow up as scheduled in Pain Management office in 3-4 weeks.  Patient has received instructions and follow up date and time.    Medications:  Continue previous medications

## 2023-07-11 NOTE — TRANSFER OF CARE
"Anesthesia Transfer of Care Note    Patient: Humera Guerrero    Procedure(s) Performed: Procedure(s) (LRB):  Bilateral L4-5, 5-S1 medial branch block #2 (Bilateral)    Patient location: PACU    Anesthesia Type: general    Transport from OR: Transported from OR on room air with adequate spontaneous ventilation    Post pain: adequate analgesia    Post assessment: no apparent anesthetic complications and tolerated procedure well    Post vital signs: stable    Level of consciousness: alert and responds to stimulation    Nausea/Vomiting: no nausea/vomiting    Complications: none    Transfer of care protocol was followed      Last vitals:   Visit Vitals  /61   Pulse 84   Temp 37.1 °C (98.7 °F) (Oral)   Resp (!) 22   Ht 5' 6" (1.676 m)   Wt 123.4 kg (272 lb)   LMP  (LMP Unknown)   SpO2 99%   BMI 43.90 kg/m²     "

## 2023-07-11 NOTE — DISCHARGE INSTRUCTIONS
REFER TO WRITTEN DOCUMENT AND RECOVERY INFORMATION.        INFORMED PATIENT IF UNABLE TO VOID IN 8 HOURS, GO TO ER. NOTIFY MD OF REDNESS OR DRAINAGE FROM INJECTION SITE OR FEVER OVER 3-4 DAY. REST AND DRINK PLENTY OF FLUIDS FOR THE REMAINDER OF THE DAY. NO LIFTING OVER 5 LBS FOR THE REMAINDER OF THE DAY. CONTINUE REGULAR MEDICATIONS AS PRESCRIBED. MAY TAKE PAIN MEDICATION AS PRESCRIBED.

## 2023-07-11 NOTE — ANESTHESIA POSTPROCEDURE EVALUATION
Anesthesia Post Evaluation    Patient: Humera Guerrero    Procedure(s) Performed: Procedure(s) (LRB):  Bilateral L4-5, 5-S1 medial branch block #2 (Bilateral)    Final Anesthesia Type: general      Patient location during evaluation: PACU  Patient participation: Yes- Able to Participate  Level of consciousness: awake and alert and oriented  Post-procedure vital signs: reviewed and stable  Pain management: adequate  Airway patency: patent    PONV status at discharge: No PONV  Anesthetic complications: no      Cardiovascular status: blood pressure returned to baseline and hemodynamically stable  Respiratory status: unassisted  Hydration status: euvolemic  Follow-up not needed.          Vitals Value Taken Time   /76 07/11/23 1615   Temp  07/11/23 1615   Pulse 73 07/11/23 1615   Resp 23 07/11/23 1615   SpO2 98 % 07/11/23 1615   Vitals shown include unvalidated device data.      No case tracking events are documented in the log.      Pain/Ana Score: No data recorded

## 2023-07-11 NOTE — PLAN OF CARE
REFER TO WRITTEN DOCUMENT AND RECOVERY INFORMATION.    D/CD PATIENT VIAA WHEELCHAIR AT 1711.    INFORMED PATIENT IF UNABLE TO VOID IN 8 HOURS, GO TO ER. NOTIFY MD OF REDNESS OR DRAINAGE FROM INJECTION SITE OR FEVER OVER 3-4 DAY. REST AND DRINK PLENTY OF FLUIDS FOR THE REMAINDER OF THE DAY. NO LIFTING OVER 5 LBS FOR THE REMAINDER OF THE DAY. CONTINUE REGULAR MEDICATIONS AS PRESCRIBED. MAY TAKE PAIN MEDICATION AS PRESCRIBED.     PAIN IMPROVED  100%

## 2023-07-31 ENCOUNTER — OFFICE VISIT (OUTPATIENT)
Dept: PAIN MEDICINE | Facility: CLINIC | Age: 43
End: 2023-07-31
Payer: COMMERCIAL

## 2023-07-31 VITALS
HEART RATE: 103 BPM | SYSTOLIC BLOOD PRESSURE: 162 MMHG | BODY MASS INDEX: 44.52 KG/M2 | HEIGHT: 66 IN | DIASTOLIC BLOOD PRESSURE: 99 MMHG | WEIGHT: 277 LBS | RESPIRATION RATE: 18 BRPM

## 2023-07-31 DIAGNOSIS — G89.29 CHRONIC BILATERAL LOW BACK PAIN WITHOUT SCIATICA: Chronic | ICD-10-CM

## 2023-07-31 DIAGNOSIS — M47.817 SPONDYLOSIS OF LUMBOSACRAL REGION WITHOUT MYELOPATHY OR RADICULOPATHY: Primary | Chronic | ICD-10-CM

## 2023-07-31 DIAGNOSIS — M54.50 CHRONIC BILATERAL LOW BACK PAIN WITHOUT SCIATICA: Chronic | ICD-10-CM

## 2023-07-31 PROCEDURE — 99214 OFFICE O/P EST MOD 30 MIN: CPT | Mod: S$PBB,,, | Performed by: PAIN MEDICINE

## 2023-07-31 PROCEDURE — 3080F DIAST BP >= 90 MM HG: CPT | Mod: ,,, | Performed by: PAIN MEDICINE

## 2023-07-31 PROCEDURE — 3077F SYST BP >= 140 MM HG: CPT | Mod: ,,, | Performed by: PAIN MEDICINE

## 2023-07-31 PROCEDURE — 3008F PR BODY MASS INDEX (BMI) DOCUMENTED: ICD-10-PCS | Mod: ,,, | Performed by: PAIN MEDICINE

## 2023-07-31 PROCEDURE — 4010F ACE/ARB THERAPY RXD/TAKEN: CPT | Mod: ,,, | Performed by: PAIN MEDICINE

## 2023-07-31 PROCEDURE — 1159F MED LIST DOCD IN RCRD: CPT | Mod: ,,, | Performed by: PAIN MEDICINE

## 2023-07-31 PROCEDURE — 99215 OFFICE O/P EST HI 40 MIN: CPT | Mod: PBBFAC | Performed by: PAIN MEDICINE

## 2023-07-31 PROCEDURE — 3080F PR MOST RECENT DIASTOLIC BLOOD PRESSURE >= 90 MM HG: ICD-10-PCS | Mod: ,,, | Performed by: PAIN MEDICINE

## 2023-07-31 PROCEDURE — 99214 PR OFFICE/OUTPT VISIT, EST, LEVL IV, 30-39 MIN: ICD-10-PCS | Mod: S$PBB,,, | Performed by: PAIN MEDICINE

## 2023-07-31 PROCEDURE — 3077F PR MOST RECENT SYSTOLIC BLOOD PRESSURE >= 140 MM HG: ICD-10-PCS | Mod: ,,, | Performed by: PAIN MEDICINE

## 2023-07-31 PROCEDURE — 1159F PR MEDICATION LIST DOCUMENTED IN MEDICAL RECORD: ICD-10-PCS | Mod: ,,, | Performed by: PAIN MEDICINE

## 2023-07-31 PROCEDURE — 4010F PR ACE/ARB THEARPY RXD/TAKEN: ICD-10-PCS | Mod: ,,, | Performed by: PAIN MEDICINE

## 2023-07-31 PROCEDURE — 3008F BODY MASS INDEX DOCD: CPT | Mod: ,,, | Performed by: PAIN MEDICINE

## 2023-07-31 NOTE — PROGRESS NOTES
She Disclaimer: This note has been generated using voice-recognition software. There may be typographical errors that have been missed during proof-reading        Patient ID: Humera Guerrero is a 42 y.o. female.      Chief Complaint: Low-back Pain      42-year-old female returns for re-evaluation following lumbar medial branch block # 2.  She received greater than 80% pain relief for the duration of the local anesthetic and about 30% pain relief today.  The pain is primarily axial axial without a radicular component.  MRI revealed multilevel degenerative changes, disc bulges, facet hypertrophy and ligamentum  flavum hypertrophy from L3-S1.  Gabapentin and NSAIDs have provided minimal relief.  She returns today to discuss the medial branch radiofrequency for intractable lower back pain and spondylosis.                    Pain Assessment  Pain Assessment: 0-10  Pain Score:   7  Pain Location: Back (lower ronnie pain and hip)  Pain Descriptors: Sharp, Burning  Pain Frequency: Constant/continuous  Pain Onset: Gradual  Clinical Progression: Gradually worsening  Aggravating Factors: Bending, Standing, Walking  Pain Intervention(s): Medication (See eMAR), Home medication, Heat applied      A's of Opioid Risk Assessment  Activity:Patient has difficulty performing ADL.   Analgesia:  None  Adverse Effects: Patient denies constipation or sedation.  Aberrant Behavior:  reviewed with no aberrant drug seeking/taking behavior.      Patient denies any suicidal or homicidal ideations    Physical Therapy/Home Exercise: yes      FL Fluoro for Pain Management  See OP Notes for results.     IMPRESSION: See OP Notes for results.     This procedure was auto-finalized by: Virtual Radiologist      Review of Systems   Constitutional: Negative.    HENT: Negative.     Eyes: Negative.    Respiratory: Negative.     Cardiovascular: Negative.    Gastrointestinal: Negative.    Endocrine: Negative.    Genitourinary: Negative.    Musculoskeletal:   Positive for back pain.   Integumentary:  Negative.   Neurological: Negative.    Hematological: Negative.    Psychiatric/Behavioral: Negative.               Past Medical History:   Diagnosis Date    Cerebral cyst     Other hyperlipidemia     Prediabetes     Vitamin D insufficiency      Past Surgical History:   Procedure Laterality Date     SECTION      HYSTERECTOMY Bilateral     INJECTION OF ANESTHETIC AGENT AROUND MEDIAL BRANCH NERVES INNERVATING LUMBAR FACET JOINT Bilateral 2023    Procedure: Bilateral L4-5,5-S1 MBB;  Surgeon: Carri Sanchez MD;  Location: [facility];  Service: Pain Management;  Laterality: Bilateral;    INJECTION OF ANESTHETIC AGENT AROUND MEDIAL BRANCH NERVES INNERVATING LUMBAR FACET JOINT Bilateral 2023    Procedure: Bilateral L4-5, 5-S1 medial branch block #2;  Surgeon: Carri Sanchez MD;  Location: [facility];  Service: Pain Management;  Laterality: Bilateral;    TUBAL LIGATION       Social History     Socioeconomic History    Marital status:     Number of children: 3    Highest education level: Master's degree (e.g., MA, MS, Maurice, MEd, MSW, LILIANA)   Occupational History     Comment:  and therapist   Tobacco Use    Smoking status: Former     Current packs/day: 0.00     Types: Cigars     Passive exposure: Past    Smokeless tobacco: Never   Substance and Sexual Activity    Alcohol use: Yes     Comment: 1 mixed drink ocassionally    Drug use: Never    Sexual activity: Yes   Social History Narrative    Lives with  2 daughters and 1 adult son    No Pets.    No smoking in the home.     Family History   Problem Relation Age of Onset    Other Mother     Hypertension Mother     Hypertension Maternal Grandmother     Breast cancer Paternal Grandmother     Cancer Paternal Grandmother      Review of patient's allergies indicates:  No Known Allergies  has a current medication list which includes the following prescription(s): atorvastatin,  colchicine, estradiol, hydroxyzine pamoate, ibuprofen, losartan, sertraline, and gabapentin.      Objective:  Vitals:    07/31/23 1344   BP: (!) 162/99   Pulse: 103   Resp: 18        Physical Exam  Vitals and nursing note reviewed.   Constitutional:       General: She is not in acute distress.     Appearance: Normal appearance. She is not ill-appearing, toxic-appearing or diaphoretic.   HENT:      Head: Normocephalic and atraumatic.      Nose: Nose normal.      Mouth/Throat:      Mouth: Mucous membranes are moist.   Eyes:      Extraocular Movements: Extraocular movements intact.      Pupils: Pupils are equal, round, and reactive to light.   Cardiovascular:      Rate and Rhythm: Normal rate and regular rhythm.      Heart sounds: Normal heart sounds.   Pulmonary:      Effort: Pulmonary effort is normal. No respiratory distress.      Breath sounds: Normal breath sounds. No stridor. No wheezing or rhonchi.   Abdominal:      General: Bowel sounds are normal.      Palpations: Abdomen is soft.   Musculoskeletal:         General: No swelling or deformity. Normal range of motion.      Cervical back: Normal and normal range of motion. No spasms or tenderness. No pain with movement. Normal range of motion.      Thoracic back: Normal.      Lumbar back: Tenderness present. No spasms or bony tenderness. Normal range of motion. Negative right straight leg raise test and negative left straight leg raise test. No scoliosis.      Right lower leg: No edema.      Left lower leg: No edema.   Skin:     General: Skin is warm.   Neurological:      General: No focal deficit present.      Mental Status: She is alert and oriented to person, place, and time. Mental status is at baseline.      Cranial Nerves: No cranial nerve deficit.      Sensory: Sensation is intact. No sensory deficit.      Motor: No weakness.      Coordination: Coordination normal.      Gait: Gait normal.      Deep Tendon Reflexes: Reflexes are normal and symmetric.    Psychiatric:         Mood and Affect: Mood normal.         Behavior: Behavior normal.           Assessment:      1. Spondylosis of lumbosacral region without myelopathy or radiculopathy    2. Chronic bilateral low back pain without sciatica          Plan:  1. reviewed    2.Indication: The patient has clinical and radiologic findings suggestive of facet mediated pain and is s/p 2nd diagnostic bilateral lumbar L4/5 and L5/S1 medial branch blocks with at least 80% relief of their axial back pain lasting the duration of the local anesthetic utilized.        3.Indications for this procedure for this specific patient include the following   - Pt has had symptoms for three months with moderate to severe pain with functional impairment rated of 7/10 pain.   - Pain non-responsive to conservative care.    - Pain predominately axial and not associated with radiculopathy or claudication.    - No non-facet pathology as source of pain.    - Clinical assessment implicates facet joint as putative pain source.    - Pain is exacerbated by extension or prolonged sitting/standing and relieved by rest.    - No unexplained neurologic deficit.    - No history of coagulopathy, infection or unstable medical conditions.    - Pain is causing significant functional limitation resulting in diminished quality of life and impaired age appropriate ADL's.   - Clinical assessment implicates facet joint as putative source of pain  - Repeat injections not done prior to 7 days   - no more than 2 levels will be done per side      4.Monitored Anesthesia Care medical necessity authorization request:    Monitor anesthesia request is medically indicated for the scheduled nerve block procedure due to:  - needle phobia and anxiety, placing  the patient at risk during the provided service.  -patient has a BMI greater than 40  -patient has an ASA class greater than 3 and requires constant presence of an anesthesiologist during the procedure:  -patient has  severe problems with muscles and muscle spasticity that makes it hard to lie still  -patient suffers from chronic pain and is unable to function due to  diminished ADLs    5.The planned medically necessary  surgical procedure is performed in a hospital outpatient department and not in an ambulatory surgical center due to:     -there is no geographically assessable ambulatory surgery center that has the  necessary equipment and fluoroscopy needed for the procedure     -there is no geographically assessable ambulatory surgical center available at which the physician has privileges     -an ASC's  specific  guideline regarding the individuals weight or health conditions that prevent the use of an ASC         report:  Reviewed and consistent with medication use as prescribed.      The total time spent for evaluation and management on 07/31/2023 including reviewing separately obtained history, performing a medically appropriate exam and evaluation, documenting clinical information in the health record, independently interpreting results and communicating them to the patient/family/caregiver, and ordering medications/tests/procedures was between 15-29 minutes.    The above plan and management options were discussed at length with patient. Patient is in agreement with the above and verbalized understanding. It will be communicated with the referring physician via electronic record, fax, or mail.

## 2023-07-31 NOTE — PATIENT INSTRUCTIONS
You are scheduled on August 17, 2023 at 1:50 pm for your procedure- BILATERAL L4-S1 MB RFTC with Dr. Sanchez.        Procedure Instructions:    Nothing to eat or drink for 8 hours or after midnight including gum, candy, mints, or tobacco products.  If you are scheduled for 1:30 or later nothing to eat or drink after 5 a.m. the morning of the procedure, including gum, candy, mints, or tobacco products.  Must have a  at least 18 yrs of age to stay present at all times  No Diabetic medications the morning of procedure, check blood sugar the morning of procedure, if it is greater than 200 call the office at [phone]  If you are started on antibiotics or have been prescribed antibiotics, have a fever, or have any other type of infection call to reschedule procedure.  If you take blood pressure medications you can take it at your regular scheduled time with a small sip of WATER!  Dentures are to be removed prior to procedure or we can provide you with a denture cup to remove them prior to being taken back for procedure.   False eyelashes are to be removed before the morning of procedure.    Contacts will have to be removed prior to procedure.  No jewelry is to be worn to the procedure.     HOLD ASPIRIN AND ASPIRIN PRODUCTS  (ASPIRIN, BC POWDER ETC. ) FOR 7 DAYS  PRIOR TO PROCEDURE  HOLD NSAIDS( ibuprofen, mobic, meloxicam, advil, diclofenac, naproxen, relafen, celebrex,  methotrexate, aleve etc....)  FOR 3 DAYS   PRIOR TO PROCEDURE

## 2023-07-31 NOTE — H&P (VIEW-ONLY)
She Disclaimer: This note has been generated using voice-recognition software. There may be typographical errors that have been missed during proof-reading        Patient ID: Humera Guerrero is a 42 y.o. female.      Chief Complaint: Low-back Pain      42-year-old female returns for re-evaluation following lumbar medial branch block # 2.  She received greater than 80% pain relief for the duration of the local anesthetic and about 30% pain relief today.  The pain is primarily axial axial without a radicular component.  MRI revealed multilevel degenerative changes, disc bulges, facet hypertrophy and ligamentum  flavum hypertrophy from L3-S1.  Gabapentin and NSAIDs have provided minimal relief.  She returns today to discuss the medial branch radiofrequency for intractable lower back pain and spondylosis.                    Pain Assessment  Pain Assessment: 0-10  Pain Score:   7  Pain Location: Back (lower ronnie pain and hip)  Pain Descriptors: Sharp, Burning  Pain Frequency: Constant/continuous  Pain Onset: Gradual  Clinical Progression: Gradually worsening  Aggravating Factors: Bending, Standing, Walking  Pain Intervention(s): Medication (See eMAR), Home medication, Heat applied      A's of Opioid Risk Assessment  Activity:Patient has difficulty performing ADL.   Analgesia:  None  Adverse Effects: Patient denies constipation or sedation.  Aberrant Behavior:  reviewed with no aberrant drug seeking/taking behavior.      Patient denies any suicidal or homicidal ideations    Physical Therapy/Home Exercise: yes      FL Fluoro for Pain Management  See OP Notes for results.     IMPRESSION: See OP Notes for results.     This procedure was auto-finalized by: Virtual Radiologist      Review of Systems   Constitutional: Negative.    HENT: Negative.     Eyes: Negative.    Respiratory: Negative.     Cardiovascular: Negative.    Gastrointestinal: Negative.    Endocrine: Negative.    Genitourinary: Negative.    Musculoskeletal:   Positive for back pain.   Integumentary:  Negative.   Neurological: Negative.    Hematological: Negative.    Psychiatric/Behavioral: Negative.               Past Medical History:   Diagnosis Date    Cerebral cyst     Other hyperlipidemia     Prediabetes     Vitamin D insufficiency      Past Surgical History:   Procedure Laterality Date     SECTION      HYSTERECTOMY Bilateral     INJECTION OF ANESTHETIC AGENT AROUND MEDIAL BRANCH NERVES INNERVATING LUMBAR FACET JOINT Bilateral 2023    Procedure: Bilateral L4-5,5-S1 MBB;  Surgeon: Carri Sanchez MD;  Location: [facility];  Service: Pain Management;  Laterality: Bilateral;    INJECTION OF ANESTHETIC AGENT AROUND MEDIAL BRANCH NERVES INNERVATING LUMBAR FACET JOINT Bilateral 2023    Procedure: Bilateral L4-5, 5-S1 medial branch block #2;  Surgeon: Carri Sanchez MD;  Location: [facility];  Service: Pain Management;  Laterality: Bilateral;    TUBAL LIGATION       Social History     Socioeconomic History    Marital status:     Number of children: 3    Highest education level: Master's degree (e.g., MA, MS, Maurice, MEd, MSW, LILIANA)   Occupational History     Comment:  and therapist   Tobacco Use    Smoking status: Former     Current packs/day: 0.00     Types: Cigars     Passive exposure: Past    Smokeless tobacco: Never   Substance and Sexual Activity    Alcohol use: Yes     Comment: 1 mixed drink ocassionally    Drug use: Never    Sexual activity: Yes   Social History Narrative    Lives with  2 daughters and 1 adult son    No Pets.    No smoking in the home.     Family History   Problem Relation Age of Onset    Other Mother     Hypertension Mother     Hypertension Maternal Grandmother     Breast cancer Paternal Grandmother     Cancer Paternal Grandmother      Review of patient's allergies indicates:  No Known Allergies  has a current medication list which includes the following prescription(s): atorvastatin,  colchicine, estradiol, hydroxyzine pamoate, ibuprofen, losartan, sertraline, and gabapentin.      Objective:  Vitals:    07/31/23 1344   BP: (!) 162/99   Pulse: 103   Resp: 18        Physical Exam  Vitals and nursing note reviewed.   Constitutional:       General: She is not in acute distress.     Appearance: Normal appearance. She is not ill-appearing, toxic-appearing or diaphoretic.   HENT:      Head: Normocephalic and atraumatic.      Nose: Nose normal.      Mouth/Throat:      Mouth: Mucous membranes are moist.   Eyes:      Extraocular Movements: Extraocular movements intact.      Pupils: Pupils are equal, round, and reactive to light.   Cardiovascular:      Rate and Rhythm: Normal rate and regular rhythm.      Heart sounds: Normal heart sounds.   Pulmonary:      Effort: Pulmonary effort is normal. No respiratory distress.      Breath sounds: Normal breath sounds. No stridor. No wheezing or rhonchi.   Abdominal:      General: Bowel sounds are normal.      Palpations: Abdomen is soft.   Musculoskeletal:         General: No swelling or deformity. Normal range of motion.      Cervical back: Normal and normal range of motion. No spasms or tenderness. No pain with movement. Normal range of motion.      Thoracic back: Normal.      Lumbar back: Tenderness present. No spasms or bony tenderness. Normal range of motion. Negative right straight leg raise test and negative left straight leg raise test. No scoliosis.      Right lower leg: No edema.      Left lower leg: No edema.   Skin:     General: Skin is warm.   Neurological:      General: No focal deficit present.      Mental Status: She is alert and oriented to person, place, and time. Mental status is at baseline.      Cranial Nerves: No cranial nerve deficit.      Sensory: Sensation is intact. No sensory deficit.      Motor: No weakness.      Coordination: Coordination normal.      Gait: Gait normal.      Deep Tendon Reflexes: Reflexes are normal and symmetric.    Psychiatric:         Mood and Affect: Mood normal.         Behavior: Behavior normal.           Assessment:      1. Spondylosis of lumbosacral region without myelopathy or radiculopathy    2. Chronic bilateral low back pain without sciatica          Plan:  1. reviewed    2.Indication: The patient has clinical and radiologic findings suggestive of facet mediated pain and is s/p 2nd diagnostic bilateral lumbar L4/5 and L5/S1 medial branch blocks with at least 80% relief of their axial back pain lasting the duration of the local anesthetic utilized.        3.Indications for this procedure for this specific patient include the following   - Pt has had symptoms for three months with moderate to severe pain with functional impairment rated of 7/10 pain.   - Pain non-responsive to conservative care.    - Pain predominately axial and not associated with radiculopathy or claudication.    - No non-facet pathology as source of pain.    - Clinical assessment implicates facet joint as putative pain source.    - Pain is exacerbated by extension or prolonged sitting/standing and relieved by rest.    - No unexplained neurologic deficit.    - No history of coagulopathy, infection or unstable medical conditions.    - Pain is causing significant functional limitation resulting in diminished quality of life and impaired age appropriate ADL's.   - Clinical assessment implicates facet joint as putative source of pain  - Repeat injections not done prior to 7 days   - no more than 2 levels will be done per side      4.Monitored Anesthesia Care medical necessity authorization request:    Monitor anesthesia request is medically indicated for the scheduled nerve block procedure due to:  - needle phobia and anxiety, placing  the patient at risk during the provided service.  -patient has a BMI greater than 40  -patient has an ASA class greater than 3 and requires constant presence of an anesthesiologist during the procedure:  -patient has  severe problems with muscles and muscle spasticity that makes it hard to lie still  -patient suffers from chronic pain and is unable to function due to  diminished ADLs    5.The planned medically necessary  surgical procedure is performed in a hospital outpatient department and not in an ambulatory surgical center due to:     -there is no geographically assessable ambulatory surgery center that has the  necessary equipment and fluoroscopy needed for the procedure     -there is no geographically assessable ambulatory surgical center available at which the physician has privileges     -an ASC's  specific  guideline regarding the individuals weight or health conditions that prevent the use of an ASC         report:  Reviewed and consistent with medication use as prescribed.      The total time spent for evaluation and management on 07/31/2023 including reviewing separately obtained history, performing a medically appropriate exam and evaluation, documenting clinical information in the health record, independently interpreting results and communicating them to the patient/family/caregiver, and ordering medications/tests/procedures was between 15-29 minutes.    The above plan and management options were discussed at length with patient. Patient is in agreement with the above and verbalized understanding. It will be communicated with the referring physician via electronic record, fax, or mail.

## 2023-08-17 ENCOUNTER — ANESTHESIA EVENT (OUTPATIENT)
Dept: PAIN MEDICINE | Facility: HOSPITAL | Age: 43
End: 2023-08-17
Payer: COMMERCIAL

## 2023-08-17 ENCOUNTER — HOSPITAL ENCOUNTER (OUTPATIENT)
Facility: HOSPITAL | Age: 43
Discharge: HOME | End: 2023-08-17
Attending: PAIN MEDICINE | Admitting: PAIN MEDICINE
Payer: COMMERCIAL

## 2023-08-17 ENCOUNTER — ANESTHESIA (OUTPATIENT)
Dept: PAIN MEDICINE | Facility: HOSPITAL | Age: 43
End: 2023-08-17
Payer: COMMERCIAL

## 2023-08-17 VITALS
RESPIRATION RATE: 17 BRPM | TEMPERATURE: 98 F | WEIGHT: 275 LBS | HEIGHT: 66 IN | HEART RATE: 69 BPM | SYSTOLIC BLOOD PRESSURE: 135 MMHG | BODY MASS INDEX: 44.2 KG/M2 | DIASTOLIC BLOOD PRESSURE: 77 MMHG | OXYGEN SATURATION: 100 %

## 2023-08-17 DIAGNOSIS — M47.817 SPONDYLOSIS OF LUMBOSACRAL REGION WITHOUT MYELOPATHY OR RADICULOPATHY: ICD-10-CM

## 2023-08-17 PROCEDURE — 25000003 PHARM REV CODE 250: Performed by: PAIN MEDICINE

## 2023-08-17 PROCEDURE — 64635 DESTROY LUMB/SAC FACET JNT: CPT | Mod: 50,,, | Performed by: PAIN MEDICINE

## 2023-08-17 PROCEDURE — 63600175 PHARM REV CODE 636 W HCPCS: Performed by: NURSE ANESTHETIST, CERTIFIED REGISTERED

## 2023-08-17 PROCEDURE — D9220A PRA ANESTHESIA: ICD-10-PCS | Mod: ,,, | Performed by: NURSE ANESTHETIST, CERTIFIED REGISTERED

## 2023-08-17 PROCEDURE — 25000003 PHARM REV CODE 250: Performed by: NURSE ANESTHETIST, CERTIFIED REGISTERED

## 2023-08-17 PROCEDURE — 27000284 HC CANNULA NASAL: Performed by: NURSE ANESTHETIST, CERTIFIED REGISTERED

## 2023-08-17 PROCEDURE — D9220A PRA ANESTHESIA: Mod: ,,, | Performed by: NURSE ANESTHETIST, CERTIFIED REGISTERED

## 2023-08-17 PROCEDURE — 63600175 PHARM REV CODE 636 W HCPCS: Performed by: PAIN MEDICINE

## 2023-08-17 PROCEDURE — 64636 DESTROY L/S FACET JNT ADDL: CPT | Mod: 50,,, | Performed by: PAIN MEDICINE

## 2023-08-17 PROCEDURE — 64636 DESTROY L/S FACET JNT ADDL: CPT | Mod: 50 | Performed by: PAIN MEDICINE

## 2023-08-17 PROCEDURE — 37000008 HC ANESTHESIA 1ST 15 MINUTES: Performed by: PAIN MEDICINE

## 2023-08-17 PROCEDURE — 27201423 OPTIME MED/SURG SUP & DEVICES STERILE SUPPLY: Performed by: PAIN MEDICINE

## 2023-08-17 PROCEDURE — 37000009 HC ANESTHESIA EA ADD 15 MINS: Performed by: PAIN MEDICINE

## 2023-08-17 PROCEDURE — 64635 DESTROY LUMB/SAC FACET JNT: CPT | Mod: 50 | Performed by: PAIN MEDICINE

## 2023-08-17 PROCEDURE — 64635 PR DESTROY LUMB/SAC FACET JNT: ICD-10-PCS | Mod: 50,,, | Performed by: PAIN MEDICINE

## 2023-08-17 PROCEDURE — 64636 PR DESTROY L/S FACET JNT ADDL: ICD-10-PCS | Mod: 50,,, | Performed by: PAIN MEDICINE

## 2023-08-17 RX ORDER — PROPOFOL 10 MG/ML
VIAL (ML) INTRAVENOUS
Status: DISCONTINUED | OUTPATIENT
Start: 2023-08-17 | End: 2023-08-17

## 2023-08-17 RX ORDER — LIDOCAINE HYDROCHLORIDE 20 MG/ML
INJECTION, SOLUTION EPIDURAL; INFILTRATION; INTRACAUDAL; PERINEURAL
Status: DISCONTINUED | OUTPATIENT
Start: 2023-08-17 | End: 2023-08-17

## 2023-08-17 RX ORDER — FENTANYL CITRATE 50 UG/ML
INJECTION, SOLUTION INTRAMUSCULAR; INTRAVENOUS
Status: DISCONTINUED | OUTPATIENT
Start: 2023-08-17 | End: 2023-08-17

## 2023-08-17 RX ORDER — SODIUM CHLORIDE 9 MG/ML
INJECTION, SOLUTION INTRAVENOUS CONTINUOUS
Status: DISCONTINUED | OUTPATIENT
Start: 2023-08-17 | End: 2023-08-17 | Stop reason: HOSPADM

## 2023-08-17 RX ORDER — TRIAMCINOLONE ACETONIDE 40 MG/ML
INJECTION, SUSPENSION INTRA-ARTICULAR; INTRAMUSCULAR CODE/TRAUMA/SEDATION MEDICATION
Status: DISCONTINUED | OUTPATIENT
Start: 2023-08-17 | End: 2023-08-17 | Stop reason: HOSPADM

## 2023-08-17 RX ORDER — ORPHENADRINE CITRATE 30 MG/ML
INJECTION INTRAMUSCULAR; INTRAVENOUS
Status: DISCONTINUED | OUTPATIENT
Start: 2023-08-17 | End: 2023-08-17

## 2023-08-17 RX ORDER — BUPIVACAINE HYDROCHLORIDE 2.5 MG/ML
INJECTION, SOLUTION INFILTRATION; PERINEURAL CODE/TRAUMA/SEDATION MEDICATION
Status: DISCONTINUED | OUTPATIENT
Start: 2023-08-17 | End: 2023-08-17 | Stop reason: HOSPADM

## 2023-08-17 RX ADMIN — SODIUM CHLORIDE: 9 INJECTION, SOLUTION INTRAVENOUS at 02:08

## 2023-08-17 RX ADMIN — LIDOCAINE HYDROCHLORIDE 50 MG: 20 INJECTION, SOLUTION INTRAVENOUS at 02:08

## 2023-08-17 RX ADMIN — PROPOFOL 50 MG: 10 INJECTION, EMULSION INTRAVENOUS at 02:08

## 2023-08-17 RX ADMIN — ORPHENADRINE CITRATE 60 MG: 30 INJECTION INTRAMUSCULAR; INTRAVENOUS at 02:08

## 2023-08-17 RX ADMIN — FENTANYL CITRATE 100 MCG: 50 INJECTION INTRAMUSCULAR; INTRAVENOUS at 02:08

## 2023-08-17 NOTE — ANESTHESIA PREPROCEDURE EVALUATION
08/17/2023  Humera Guerrero is a 42 y.o., female.      Pre-op Assessment    I have reviewed the Patient Summary Reports.     I have reviewed the Nursing Notes. I have reviewed the NPO Status.   I have reviewed the Medications.     Review of Systems  Anesthesia Hx:  No problems with previous Anesthesia  Family Hx of Anesthesia complications:  Personal Hx of Anesthesia complications   Social:  Non-Smoker    Hematology/Oncology:  Hematology Normal   Oncology Normal     EENT/Dental:EENT/Dental Normal   Cardiovascular:   Hypertension hyperlipidemia    Pulmonary:  Pulmonary Normal    Renal/:  Renal/ Normal     Hepatic/GI:  Hepatic/GI Normal    Musculoskeletal:  Spine Disorders: lumbar    Neurological:   Neuromuscular Disease, Headaches   Chronic Pain Syndrome   Endocrine:  Morbid Obesity / BMI > 40  Dermatological:  Skin Normal    Psych:  Psychiatric Normal           Physical Exam  General: Well nourished, Cooperative, Alert and Oriented    Airway:  Mallampati: II   Mouth Opening: Normal  TM Distance: Normal  Tongue: Normal  Neck ROM: Normal ROM    Chest/Lungs:  Clear to auscultation, Normal Respiratory Rate    Heart:  Rate: Normal  Rhythm: Regular Rhythm    Abdomen:  Normal, Soft        Anesthesia Plan  Type of Anesthesia, risks & benefits discussed:    Anesthesia Type: Gen Natural Airway  Intra-op Monitoring Plan: Standard ASA Monitors  Post Op Pain Control Plan: multimodal analgesia  Induction:  IV  Informed Consent: Informed consent signed with the Patient and all parties understand the risks and agree with anesthesia plan.  All questions answered. Patient consented to blood products? Yes  ASA Score: 3    Ready For Surgery From Anesthesia Perspective.     .

## 2023-08-17 NOTE — PLAN OF CARE
Plan:  D/c pt via wheelchair at 1510    Informed pt if does not void in 8 hours to go to ER. Notify if redness, drainage, from injection site or fever over next 3-4 days. Rest and drink plenty of fluids for the remainder of the day. No lifting over 5 lbs. For the remainder of the day. Continue regular medications as prescribed. May take pain medications as prescribed.     Pain improved 0%

## 2023-08-17 NOTE — OP NOTE
Procedure Note    Procedure Date: 8/17/2023    Procedure Performed:  Radiofrequency ablation of the bilateral  L4-5,5-S1 medial branch nerves utilizing fluoroscopy    Indications: Patient has failed conservative therapy.      Pre-op diagnosis: Lumbosacral Spondylosis    Post-op diagnosis: same    Physician: WIN Sanchez MD    Anesthesia:MAC    Medications injected:  Pre-lesion, 2ml of 1% lidocaine at each level.  From a mixture of  0.25% bupivacaine and  40mg of kenalog 1ml of this solution was injected at each level post-lesion.    Local anesthetic used: 1% Lidocaine, 10 ml     Estimated Blood Loss:Less than 1cc    IVF:Per Anesthesia    Complications: None    Technique:  The patient was interviewed in the holding area and Risks/Benefits were discussed, including, but not limited to  the possibility of new or different pain, bleeding or infection.   All questions were answered.  The patient understood and accepted risks.  The area of treatment was marked. Consent was reviewed and signed.  A time out was taken to identify the patient, procedure and side of the procedure. The patient was placed in a prone position, then prepped and draped in the usual sterile fashion using ChloraPrep and sterile towels.  The levels were determined under fluoroscopic guidance.   AP and oblique fluoroscopy were used to identify and jessee the junctions between the superior articular processes and transverse processes at  bilateral   L4-5,5-S1.  The bilateral  sacral ala was also identified and marked.  The skin and subcutaneous tissues in these identified areas were anesthetized with 1% lidocaine. A 20-gauge 150 mm [employer] RF needle was advanced towards each of these points under fluoroscopic guidance such that the tip of the needle was positioned posterior to the Neuro-foramen on lateral fluoroscopic view. Each needle was positioned such that, on stimulation, the patient had an appropriate pain response between 0.3-0.5 V, with no motor  response, other than Lumbar Paraspinal Muscles up to 2.0V.  One mL of 2% lidocaine was then injected at each level prior to lesioning, which was performed for 90 seconds at 80°C.  Once the lesion was complete, 1 mL of a solution from a  mixture of 0.25% bupivacaine 3 cc and 40mg kenalog  was injected through each probe. The probes were removed and a sterile Band-Aid dressing was applied to the puncture site.  The patient tolerated the procedure well and was monitored after the procedure.  Patient was given post procedure and discharge instructions to follow at home.  The patient was discharged in a stable condition and accompanied by an adult.

## 2023-08-17 NOTE — DISCHARGE SUMMARY
[facility] - Pain Management  Discharge Note  Short Stay    Procedure(s) (LRB):  Bilateral L4-5,5-S1 MB RFTC (Bilateral)      OUTCOME: Patient tolerated treatment/procedure well without complication and is now ready for discharge.    DISPOSITION: Home or Self Care    FINAL DIAGNOSIS:  Lumbosacral spondylosis    FOLLOWUP: In clinic    DISCHARGE INSTRUCTIONS:  See nurse's notes     TIME SPENT ON DISCHARGE: 5 minutes

## 2023-08-17 NOTE — ANESTHESIA POSTPROCEDURE EVALUATION
Anesthesia Post Evaluation    Patient: Humera Guerrero    Procedure(s) Performed: Procedure(s) (LRB):  Bilateral L4-5,5-S1 MB RFTC (Bilateral)    Final Anesthesia Type: general      Patient location during evaluation: PACU  Patient participation: Yes- Able to Participate  Level of consciousness: awake and alert  Post-procedure vital signs: reviewed and stable  Pain management: adequate  Airway patency: patent  MARTY mitigation strategies: Multimodal analgesia  PONV status at discharge: No PONV  Anesthetic complications: no      Cardiovascular status: blood pressure returned to baseline  Respiratory status: unassisted  Hydration status: euvolemic  Follow-up not needed.  Comments: Refer to nursing note for pain/ana score upon discharge from recovery.          Vitals Value Taken Time   /80 08/17/23 1437   Temp 36.7 °C (98 °F) 08/17/23 1430   Pulse 77 08/17/23 1437   Resp 18 08/17/23 1437   SpO2 97 % 08/17/23 1437   Vitals shown include unvalidated device data.      No case tracking events are documented in the log.      Pain/Ana Score: Ana Score: 7 (8/17/2023  2:30 PM)

## 2023-08-17 NOTE — TRANSFER OF CARE
"Anesthesia Transfer of Care Note    Patient: Humera Guerrero    Procedure(s) Performed: Procedure(s) (LRB):  Bilateral L4-5,5-S1 MB RFTC (Bilateral)    Patient location: PACU    Anesthesia Type: general    Transport from OR: Transported from OR on room air with adequate spontaneous ventilation    Post pain: adequate analgesia    Post assessment: no apparent anesthetic complications    Post vital signs: stable    Level of consciousness: responds to stimulation    Nausea/Vomiting: no nausea/vomiting    Complications: none    Transfer of care protocol was followed      Last vitals:   Visit Vitals  BP (!) 144/79   Pulse 89   Temp 36.7 °C (98 °F)   Resp 19   Ht 5' 6" (1.676 m)   Wt 124.7 kg (275 lb)   LMP  (LMP Unknown)   SpO2 100%   BMI 44.39 kg/m²     "

## 2023-08-17 NOTE — BRIEF OP NOTE
Discharge Note  Short Stay    Admit Date: 8/17/2023    Discharge Date: 8/17/2023    Attending Physician: Carri Sanchez     Discharge Provider: Carri Sanchez    Diagnosis: Lumbosacral spondylosis    Discharged Condition: Good    Final Diagnoses: Spondylosis of lumbosacral region without myelopathy or radiculopathy [M47.817]    Disposition: Home or Self Care    Hospital Course: No complications, uneventful    Outcome of Hospitalization, Treatment, Procedure, or Surgery:  Patient was admitted for outpatient interventional pain management procedure. The patient tolerated the procedure well with no complications.    Follow up/Patient Instructions:  Follow up as scheduled in Pain Management office in 3-4 weeks.  Patient has received instructions and follow up date and time.    Medications:  Continue previous medications

## 2023-08-28 PROBLEM — M47.817 LUMBOSACRAL SPONDYLOSIS WITHOUT MYELOPATHY: Chronic | Status: ACTIVE | Noted: 2023-08-28

## 2023-08-28 NOTE — PROGRESS NOTES
Subjective:         Patient ID: Humera Guerrero is a 42 y.o. female.    Chief Complaint: Low-back Pain and Hip Pain      Pain  This is a chronic problem. The current episode started more than 1 year ago. The problem occurs daily. The problem has been gradually improving. Associated symptoms include arthralgias. Pertinent negatives include no anorexia, change in bowel habit, chest pain, chills, coughing, diaphoresis, fever, neck pain, rash, sore throat, swollen glands, urinary symptoms, vertigo or vomiting.     Review of Systems   Constitutional:  Negative for activity change, appetite change, chills, diaphoresis, fever and unexpected weight change.   HENT:  Negative for drooling, ear discharge, ear pain, facial swelling, nosebleeds, sore throat, trouble swallowing, voice change and goiter.    Eyes:  Negative for photophobia, pain, discharge, redness and visual disturbance.   Respiratory:  Negative for apnea, cough, choking, chest tightness, shortness of breath, wheezing and stridor.    Cardiovascular:  Negative for chest pain, palpitations and leg swelling.   Gastrointestinal:  Negative for abdominal distention, anorexia, change in bowel habit, diarrhea, rectal pain, vomiting, fecal incontinence and change in bowel habit.   Endocrine: Negative for cold intolerance, heat intolerance, polydipsia, polyphagia and polyuria.   Genitourinary:  Negative for bladder incontinence, dysuria, flank pain, frequency and hot flashes.   Musculoskeletal:  Positive for arthralgias, back pain and leg pain. Negative for neck pain.   Integumentary:  Negative for color change, pallor and rash.   Allergic/Immunologic: Negative for immunocompromised state.   Neurological:  Negative for dizziness, vertigo, seizures, syncope, facial asymmetry, speech difficulty, light-headedness, coordination difficulties, memory loss and coordination difficulties.   Hematological:  Negative for adenopathy. Does not bruise/bleed easily.    Psychiatric/Behavioral:  Negative for agitation, behavioral problems, confusion, decreased concentration, dysphoric mood, hallucinations, self-injury and suicidal ideas. The patient is not nervous/anxious and is not hyperactive.            Past Medical History:   Diagnosis Date    Cerebral cyst     Other hyperlipidemia     Prediabetes     Vitamin D insufficiency      Past Surgical History:   Procedure Laterality Date     SECTION      HYSTERECTOMY Bilateral     INJECTION OF ANESTHETIC AGENT AROUND MEDIAL BRANCH NERVES INNERVATING LUMBAR FACET JOINT Bilateral 2023    Procedure: Bilateral L4-5,5-S1 MBB;  Surgeon: Carri Sanchez MD;  Location: [facility];  Service: Pain Management;  Laterality: Bilateral;    INJECTION OF ANESTHETIC AGENT AROUND MEDIAL BRANCH NERVES INNERVATING LUMBAR FACET JOINT Bilateral 2023    Procedure: Bilateral L4-5, 5-S1 medial branch block #2;  Surgeon: Carri Sanchez MD;  Location: [facility];  Service: Pain Management;  Laterality: Bilateral;    RADIOFREQUENCY ABLATION OF LUMBAR MEDIAL BRANCH NERVE AT SINGLE LEVEL Bilateral 2023    Procedure: Bilateral L4-5,5-S1 MB RFTC;  Surgeon: Carri Sanchez MD;  Location: [facility];  Service: Pain Management;  Laterality: Bilateral;    TUBAL LIGATION       Social History     Socioeconomic History    Marital status:     Number of children: 3    Highest education level: Master's degree (e.g., MA, MS, Maurice, MEd, MSW, LILIANA)   Occupational History     Comment:  and therapist   Tobacco Use    Smoking status: Former     Types: Cigars     Passive exposure: Past    Smokeless tobacco: Never   Substance and Sexual Activity    Alcohol use: Yes     Comment: 1 mixed drink ocassionally    Drug use: Never    Sexual activity: Yes   Social History Narrative    Lives with  2 daughters and 1 adult son    No Pets.    No smoking in the home.     Family History   Problem Relation Age of Onset     "Other Mother     Hypertension Mother     Hypertension Maternal Grandmother     Breast cancer Paternal Grandmother     Cancer Paternal Grandmother      Review of patient's allergies indicates:  No Known Allergies     Objective:  Vitals:    08/30/23 0851   BP: (!) 144/81   Pulse: 86   Resp: 16   Weight: 126.1 kg (278 lb)   Height: 5' 6" (1.676 m)   PainSc: 0-No pain         Physical Exam  Vitals and nursing note reviewed. Exam conducted with a chaperone present.   Constitutional:       General: She is awake. She is not in acute distress.     Appearance: Normal appearance. She is not ill-appearing, toxic-appearing or diaphoretic.   HENT:      Head: Normocephalic and atraumatic.      Nose: Nose normal.      Mouth/Throat:      Mouth: Mucous membranes are moist.      Pharynx: Oropharynx is clear.   Eyes:      Conjunctiva/sclera: Conjunctivae normal.      Pupils: Pupils are equal, round, and reactive to light.   Cardiovascular:      Rate and Rhythm: Normal rate.   Pulmonary:      Effort: Pulmonary effort is normal. No respiratory distress.   Abdominal:      Palpations: Abdomen is soft.      Tenderness: There is no guarding.   Musculoskeletal:         General: Normal range of motion.      Cervical back: Normal range of motion and neck supple. No rigidity.   Skin:     General: Skin is warm and dry.      Coloration: Skin is not jaundiced or pale.   Neurological:      General: No focal deficit present.      Mental Status: She is alert and oriented to person, place, and time. Mental status is at baseline.      Cranial Nerves: No cranial nerve deficit (II-XII).   Psychiatric:         Mood and Affect: Mood normal.         Behavior: Behavior normal. Behavior is cooperative.         Thought Content: Thought content normal.           FL Fluoro for Pain Management  See OP Notes for results.     IMPRESSION: See OP Notes for results.     This procedure was auto-finalized by: Virtual Radiologist       Office Visit on 05/01/2023 "   Component Date Value Ref Range Status    POC Amphetamines 05/01/2023 Negative  Negative, Inconclusive Final    POC Barbiturates 05/01/2023 Negative  Negative, Inconclusive Final    POC Benzodiazepines 05/01/2023 Negative  Negative, Inconclusive Final    POC Cocaine 05/01/2023 Negative  Negative, Inconclusive Final    POC THC 05/01/2023 Negative  Negative, Inconclusive Final    POC Methadone 05/01/2023 Negative  Negative, Inconclusive Final    POC Methamphetamine 05/01/2023 Negative  Negative, Inconclusive Final    POC Opiates 05/01/2023 Negative  Negative, Inconclusive Final    POC Oxycodone 05/01/2023 Negative  Negative, Inconclusive Final    POC Phencyclidine 05/01/2023 Negative  Negative, Inconclusive Final    POC Methylenedioxymethamphetamine * 05/01/2023 Negative  Negative, Inconclusive Final    POC Tricyclic Antidepressants 05/01/2023 Negative  Negative, Inconclusive Final    POC Buprenorphine 05/01/2023 Negative   Final     Acceptable 05/01/2023 Yes   Final    POC Temperature (Urine) 05/01/2023 90   Final    pH, UA 05/01/2023 6.0  5.0 to 8.0 pH Units Final    Creatinine, Urine 05/01/2023 29  28 - 219 mg/dL Final    6-Acetylmorphine 05/01/2023 Negative  10 ng/mL Final    7-Aminoclonazepam 05/01/2023 Negative  Negative 25 ng/mL Final    a-Hydroxyalprazolam 05/01/2023 Negative  Negative 25 ng/mL Final    Acetyl Fentanyl 05/01/2023 Negative  2.5 ng/mL Final    Acetyl Norfentanyl Oxalate 05/01/2023 Negative  5 ng/mL Final    Benzoylecgonine 05/01/2023 Negative  100 ng/mL Final    Buprenorphine 05/01/2023 Negative  25 ng/mL Final    Codeine 05/01/2023 Negative  25 ng/mL Final    EDDP 05/01/2023 Negative  25 ng/mL Final    Fentanyl 05/01/2023 Negative  2.5 ng/mL Final    Hydrocodone 05/01/2023 Negative  25 ng/mL Final    Hydromorphone 05/01/2023 Negative  25 ng/mL Final    Lorazepam 05/01/2023 Negative  25 ng/mL Final    Morphine 05/01/2023 Negative  25 ng/mL Final    Norbuprenorphine 05/01/2023  Negative  25 ng/mL Final    Nordiazepam 05/01/2023 Negative  25 ng/mL Final    Norfentanyl Oxalate 05/01/2023 Negative  5 ng/mL Final    Norhydrocodone 05/01/2023 Negative  50 ng/mL Final    Noroxycodone HCL 05/01/2023 Negative  50 ng/mL Final    Oxazepam 05/01/2023 Negative  25 ng/mL Final    Oxymorphone 05/01/2023 Negative  25 ng/mL Final    Tapentadol 05/01/2023 Negative  25 ng/mL Final    Temazepam 05/01/2023 Negative  25 ng/mL Final    THC-COOH 05/01/2023 Negative  25 ng/mL Final    Tramadol 05/01/2023 Negative  100 ng/mL Final    Amphetamine, Urine 05/01/2023 Negative  Negative Final    Methamphetamines, Urine 05/01/2023 Negative  Negative Final    Methadone, Urine 05/01/2023 Negative  Negative 25 ng/mL Final    Oxycodone, Urine 05/01/2023 Negative  Negative 25 ng/mL Final    Specific Gravity, UA 05/01/2023 1.006  <=1.030 Final   Office Visit on 04/10/2023   Component Date Value Ref Range Status    Color, UA 04/10/2023 Dark Yellow   Final    Spec Grav UA 04/10/2023 1.025   Final    pH, UA 04/10/2023 6.0   Final    WBC, UA 04/10/2023 Neg   Final    Nitrite, UA 04/10/2023 Neg   Final    Protein, POC 04/10/2023 Trace   Final    Glucose, UA 04/10/2023 Neg   Final    Ketones, UA 04/10/2023 Trace   Final    Bilirubin, POC 04/10/2023 Small   Final    Urobilinogen, UA 04/10/2023 0.2   Final    Blood, UA 04/10/2023 Large   Final    Case Report 04/10/2023    Final                    Value:Pap Cytology                                      Case: [id number]                                   Authorizing Provider:  SHANNON Callaway      Collected:           04/10/2023 09:21 AM          Ordering Location:     [facility] -    Received:            04/10/2023 09:21 AM                                 [facility]                                                    First Screen:          SHARON Magaña(ASCP)                                                        Specimen:    Liquid-Based Pap Test,  Screening, Vagina                                                   Interpretation 04/10/2023 Negative              Final    General Categorization 04/10/2023 Negative for intraepithelial lesion or malignancy   Final    Specimen Adequacy 04/10/2023 Satisfactory for evaluation   Final    Clinical Information 04/10/2023    Final                    Value:This result contains rich text formatting which cannot be displayed here.    Disclaimer 04/10/2023    Final                    Value:This result contains rich text formatting which cannot be displayed here.    Candida Species 04/10/2023 Negative  Negative, Invalid Final    Gardnerella 04/10/2023 Negative  Negative, Invalid Final    Trichomonas 04/10/2023 Negative  Negative, Invalid Final    Triglycerides 04/10/2023 162 (H)  35 - 150 mg/dL Final    Cholesterol 04/10/2023 168  0 - 200 mg/dL Final    HDL Cholesterol 04/10/2023 62 (H)  40 - 60 mg/dL Final    Cholesterol/HDL Ratio (Risk Factor) 04/10/2023 2.7   Final    Non-HDL 04/10/2023 106  mg/dL Final    LDL Calculated 04/10/2023 74  mg/dL Final    LDL/HDL 04/10/2023 1.2   Final    VLDL 04/10/2023 32  mg/dL Final    Sodium 04/10/2023 141  136 - 145 mmol/L Final    Potassium 04/10/2023 3.4 (L)  3.5 - 5.1 mmol/L Final    Chloride 04/10/2023 106  98 - 107 mmol/L Final    CO2 04/10/2023 31  21 - 32 mmol/L Final    Anion Gap 04/10/2023 7  7 - 16 mmol/L Final    Glucose 04/10/2023 92  74 - 106 mg/dL Final    BUN 04/10/2023 15  7 - 18 mg/dL Final    Creatinine 04/10/2023 1.05 (H)  0.55 - 1.02 mg/dL Final    BUN/Creatinine Ratio 04/10/2023 14  6 - 20 Final    Calcium 04/10/2023 9.4  8.5 - 10.1 mg/dL Final    Total Protein 04/10/2023 7.9  6.4 - 8.2 g/dL Final    Albumin 04/10/2023 3.8  3.5 - 5.0 g/dL Final    Globulin 04/10/2023 4.1 (H)  2.0 - 4.0 g/dL Final    A/G Ratio 04/10/2023 0.9   Final    Bilirubin, Total 04/10/2023 0.3  >0.0 - 1.2 mg/dL Final    Alk Phos 04/10/2023 74  37 - 98 U/L Final    ALT 04/10/2023 20  13 - 56 U/L  Final    AST 04/10/2023 14 (L)  15 - 37 U/L Final    eGFR 04/10/2023 69  >=60 mL/min/1.73m² Final    Chlamydia by PCR 04/10/2023 Negative  Negative, Invalid Final    N. gonorrhoeae (GC) by PCR 04/10/2023 Negative  Negative, Invalid Final    CORAL Screen 04/10/2023 Positive (A)  Negative Final    RA 04/10/2023 Negative  Negative Final    WBC 04/10/2023 8.19  4.50 - 11.00 K/uL Final    RBC 04/10/2023 4.75  4.20 - 5.40 M/uL Final    Hemoglobin 04/10/2023 13.2  12.0 - 16.0 g/dL Final    Hematocrit 04/10/2023 42.0  38.0 - 47.0 % Final    MCV 04/10/2023 88.4  80.0 - 96.0 fL Final    MCH 04/10/2023 27.8  27.0 - 31.0 pg Final    MCHC 04/10/2023 31.4 (L)  32.0 - 36.0 g/dL Final    RDW 04/10/2023 15.0 (H)  11.5 - 14.5 % Final    Platelet Count 04/10/2023 344  150 - 400 K/uL Final    MPV 04/10/2023 10.3  9.4 - 12.4 fL Final    Neutrophils % 04/10/2023 55.9  53.0 - 65.0 % Final    Lymphocytes % 04/10/2023 37.5  27.0 - 41.0 % Final    Monocytes % 04/10/2023 4.6  2.0 - 6.0 % Final    Eosinophils % 04/10/2023 1.2  1.0 - 4.0 % Final    Basophils % 04/10/2023 0.4  0.0 - 1.0 % Final    Immature Granulocytes % 04/10/2023 0.4  0.0 - 0.4 % Final    nRBC, Auto 04/10/2023 0.0  <=0.0 % Final    Neutrophils, Abs 04/10/2023 4.58  1.80 - 7.70 K/uL Final    Lymphocytes, Absolute 04/10/2023 3.07  1.00 - 4.80 K/uL Final    Monocytes, Absolute 04/10/2023 0.38  0.00 - 0.80 K/uL Final    Eosinophils, Absolute 04/10/2023 0.10  0.00 - 0.50 K/uL Final    Basophils, Absolute 04/10/2023 0.03  0.00 - 0.20 K/uL Final    Immature Granulocytes, Absolute 04/10/2023 0.03  0.00 - 0.04 K/uL Final    nRBC, Absolute 04/10/2023 0.00  <=0.00 x10e3/uL Final    Diff Type 04/10/2023 Auto   Final    Culture, Urine 04/10/2023 Skin/Urogenital Nery Isolated, no further workup.   Final    LUIS ARMANDO Screen 04/10/2023 Negative  Negative Final    LUIS ARMANDO Screen (EU) 04/10/2023 3.2  0.0 - 19.9 EUs Final    Anti-dsDNA 04/10/2023 Negative  Negative Final    Anti-DSDNA (IU) 04/10/2023 4  0  - 24 IU Final         No orders of the defined types were placed in this encounter.      Requested Prescriptions      No prescriptions requested or ordered in this encounter       Assessment:     1. Lumbosacral spondylosis without myelopathy         A's of Opioid Risk Assessment  Activity:Patient can perform ADL.   Analgesia:Patients pain is partially controlled by current medication. Patient has tried OTC medications such as Tylenol and Ibuprofen with out relief.   Adverse Effects: Patient denies constipation or sedation.  Aberrant Behavior:  reviewed with no aberrant drug seeking/taking behavior.  Overdose reversal drug naloxone discussed    Drug screen reviewed      Plan:    Not using narcotics from our office    Follow-up after bilateral lumbar L4 through S1 RF TC August 17, 2023  She states she had 90% relief after procedure, the procedure did help improve her level function    Procedure notes/fluoro images reviewed    X-ray sacroiliac joint [facility] May 1, 2023 no fracture noted no evidence for sacroiliitis    MRI lumbar spine [facility] April 12, 2023 multiple level degenerative changes epidural lipomatosis noted L4/5, L5/S1    She states at this point she would like to continue conservative management    Continue home exercise program as directed     Follow-up as needed, patient request     Dr. Sanchez, August 2024    Bring original prescription medication bottles/container/box with labels to each visit

## 2023-08-30 ENCOUNTER — OFFICE VISIT (OUTPATIENT)
Dept: PAIN MEDICINE | Facility: CLINIC | Age: 43
End: 2023-08-30
Payer: COMMERCIAL

## 2023-08-30 VITALS
RESPIRATION RATE: 16 BRPM | HEART RATE: 86 BPM | DIASTOLIC BLOOD PRESSURE: 81 MMHG | WEIGHT: 278 LBS | BODY MASS INDEX: 44.68 KG/M2 | SYSTOLIC BLOOD PRESSURE: 144 MMHG | HEIGHT: 66 IN

## 2023-08-30 DIAGNOSIS — M47.817 LUMBOSACRAL SPONDYLOSIS WITHOUT MYELOPATHY: Primary | Chronic | ICD-10-CM

## 2023-08-30 DIAGNOSIS — M53.3 DISORDER OF SACRUM: Chronic | ICD-10-CM

## 2023-08-30 PROCEDURE — 3008F BODY MASS INDEX DOCD: CPT | Mod: ,,, | Performed by: PHYSICIAN ASSISTANT

## 2023-08-30 PROCEDURE — 3079F DIAST BP 80-89 MM HG: CPT | Mod: ,,, | Performed by: PHYSICIAN ASSISTANT

## 2023-08-30 PROCEDURE — 1159F MED LIST DOCD IN RCRD: CPT | Mod: ,,, | Performed by: PHYSICIAN ASSISTANT

## 2023-08-30 PROCEDURE — 3077F SYST BP >= 140 MM HG: CPT | Mod: ,,, | Performed by: PHYSICIAN ASSISTANT

## 2023-08-30 PROCEDURE — 4010F ACE/ARB THERAPY RXD/TAKEN: CPT | Mod: ,,, | Performed by: PHYSICIAN ASSISTANT

## 2023-08-30 PROCEDURE — 4010F PR ACE/ARB THEARPY RXD/TAKEN: ICD-10-PCS | Mod: ,,, | Performed by: PHYSICIAN ASSISTANT

## 2023-08-30 PROCEDURE — 1159F PR MEDICATION LIST DOCUMENTED IN MEDICAL RECORD: ICD-10-PCS | Mod: ,,, | Performed by: PHYSICIAN ASSISTANT

## 2023-08-30 PROCEDURE — 3008F PR BODY MASS INDEX (BMI) DOCUMENTED: ICD-10-PCS | Mod: ,,, | Performed by: PHYSICIAN ASSISTANT

## 2023-08-30 PROCEDURE — 3079F PR MOST RECENT DIASTOLIC BLOOD PRESSURE 80-89 MM HG: ICD-10-PCS | Mod: ,,, | Performed by: PHYSICIAN ASSISTANT

## 2023-08-30 PROCEDURE — 99214 OFFICE O/P EST MOD 30 MIN: CPT | Mod: S$PBB,,, | Performed by: PHYSICIAN ASSISTANT

## 2023-08-30 PROCEDURE — 99214 OFFICE O/P EST MOD 30 MIN: CPT | Mod: PBBFAC | Performed by: PHYSICIAN ASSISTANT

## 2023-08-30 PROCEDURE — 99214 PR OFFICE/OUTPT VISIT, EST, LEVL IV, 30-39 MIN: ICD-10-PCS | Mod: S$PBB,,, | Performed by: PHYSICIAN ASSISTANT

## 2023-08-30 PROCEDURE — 3077F PR MOST RECENT SYSTOLIC BLOOD PRESSURE >= 140 MM HG: ICD-10-PCS | Mod: ,,, | Performed by: PHYSICIAN ASSISTANT

## 2023-11-02 ENCOUNTER — OFFICE VISIT (OUTPATIENT)
Dept: FAMILY MEDICINE | Facility: CLINIC | Age: 43
End: 2023-11-02
Payer: COMMERCIAL

## 2023-11-02 ENCOUNTER — PATIENT MESSAGE (OUTPATIENT)
Dept: FAMILY MEDICINE | Facility: CLINIC | Age: 43
End: 2023-11-02
Payer: COMMERCIAL

## 2023-11-02 VITALS — SYSTOLIC BLOOD PRESSURE: 166 MMHG | DIASTOLIC BLOOD PRESSURE: 97 MMHG | HEART RATE: 88 BPM

## 2023-11-02 DIAGNOSIS — I10 HYPERTENSION, UNSPECIFIED TYPE: Primary | ICD-10-CM

## 2023-11-02 DIAGNOSIS — E66.01 CLASS 3 OBESITY: ICD-10-CM

## 2023-11-02 PROBLEM — E66.813 CLASS 3 OBESITY: Status: ACTIVE | Noted: 2023-11-02

## 2023-11-02 PROCEDURE — 4010F PR ACE/ARB THEARPY RXD/TAKEN: ICD-10-PCS | Mod: 95,,, | Performed by: NURSE PRACTITIONER

## 2023-11-02 PROCEDURE — 3080F DIAST BP >= 90 MM HG: CPT | Mod: 95,,, | Performed by: NURSE PRACTITIONER

## 2023-11-02 PROCEDURE — 1160F PR REVIEW ALL MEDS BY PRESCRIBER/CLIN PHARMACIST DOCUMENTED: ICD-10-PCS | Mod: 95,,, | Performed by: NURSE PRACTITIONER

## 2023-11-02 PROCEDURE — 1159F PR MEDICATION LIST DOCUMENTED IN MEDICAL RECORD: ICD-10-PCS | Mod: 95,,, | Performed by: NURSE PRACTITIONER

## 2023-11-02 PROCEDURE — 99213 OFFICE O/P EST LOW 20 MIN: CPT | Mod: 95,,, | Performed by: NURSE PRACTITIONER

## 2023-11-02 PROCEDURE — 3080F PR MOST RECENT DIASTOLIC BLOOD PRESSURE >= 90 MM HG: ICD-10-PCS | Mod: 95,,, | Performed by: NURSE PRACTITIONER

## 2023-11-02 PROCEDURE — 3077F PR MOST RECENT SYSTOLIC BLOOD PRESSURE >= 140 MM HG: ICD-10-PCS | Mod: 95,,, | Performed by: NURSE PRACTITIONER

## 2023-11-02 PROCEDURE — 4010F ACE/ARB THERAPY RXD/TAKEN: CPT | Mod: 95,,, | Performed by: NURSE PRACTITIONER

## 2023-11-02 PROCEDURE — 99213 PR OFFICE/OUTPT VISIT, EST, LEVL III, 20-29 MIN: ICD-10-PCS | Mod: 95,,, | Performed by: NURSE PRACTITIONER

## 2023-11-02 PROCEDURE — 1159F MED LIST DOCD IN RCRD: CPT | Mod: 95,,, | Performed by: NURSE PRACTITIONER

## 2023-11-02 PROCEDURE — 1160F RVW MEDS BY RX/DR IN RCRD: CPT | Mod: 95,,, | Performed by: NURSE PRACTITIONER

## 2023-11-02 PROCEDURE — 3077F SYST BP >= 140 MM HG: CPT | Mod: 95,,, | Performed by: NURSE PRACTITIONER

## 2023-11-02 RX ORDER — LOSARTAN POTASSIUM 50 MG/1
50 TABLET ORAL DAILY
Qty: 90 TABLET | Refills: 3 | Status: SHIPPED | OUTPATIENT
Start: 2023-11-02 | End: 2024-11-01

## 2023-11-02 NOTE — PROGRESS NOTES
SHANNON Callaway        PATIENT NAME: Humera Guerrero  : 1980  DATE: 23  MRN: [id number]      Patient PCP Information       Provider PCP Type    Sultana SARAHY SHANNON Neil [facility]            Reason for Visit / Chief Complaint: Hypertension (/)     Visit type: AUDIOVISUAL   The patient location is: work  The chief complaint leading to consultation is:  hypertension    Face to Face time with patient: 10 minutes   minutes of total time spent on the encounter, which includes face to face time and non-face to face time preparing to see the patient (eg, review of tests), Obtaining and/or reviewing separately obtained history, Documenting clinical information in the electronic or other health record, Independently interpreting results (not separately reported) and communicating results to the patient/family/caregiver, or Care coordination (not separately reported).       History of Present Illness / Problem Focused Workflow     Humera Guerrero presents to the clinic with Hypertension (/)     Hypertension  This is a recurrent problem. The current episode started more than 1 month ago. The problem has been gradually improving since onset. The problem is controlled. Associated symptoms include anxiety and headaches. Pertinent negatives include no blurred vision, chest pain, malaise/fatigue, neck pain, orthopnea, palpitations, peripheral edema, PND, shortness of breath or sweats. Agents associated with hypertension include estrogens. Risk factors for coronary artery disease include dyslipidemia and obesity. Past treatments include lifestyle changes. The current treatment provides significant improvement. There are no compliance problems.          Patient is only taking cholcine prn   Zoloft 25 mg daily has been off of 2 mo    Review of Systems     Review of Systems   Constitutional:  Negative for malaise/fatigue.   Eyes:  Negative for blurred vision.   Respiratory:  Negative for shortness of breath.     Cardiovascular:  Negative for chest pain, palpitations, orthopnea and PND.   Musculoskeletal:  Negative for neck pain.   Neurological:  Positive for headaches.       Medical / Social / Family History     Past Medical History:   Diagnosis Date    Cerebral cyst     Other hyperlipidemia     Prediabetes     Vitamin D insufficiency        Past Surgical History:   Procedure Laterality Date     SECTION      HYSTERECTOMY Bilateral     INJECTION OF ANESTHETIC AGENT AROUND MEDIAL BRANCH NERVES INNERVATING LUMBAR FACET JOINT Bilateral 2023    Procedure: Bilateral L4-5,5-S1 MBB;  Surgeon: Carri Sanchez MD;  Location: [facility];  Service: Pain Management;  Laterality: Bilateral;    INJECTION OF ANESTHETIC AGENT AROUND MEDIAL BRANCH NERVES INNERVATING LUMBAR FACET JOINT Bilateral 2023    Procedure: Bilateral L4-5, 5-S1 medial branch block #2;  Surgeon: Carri Sanchez MD;  Location: [facility];  Service: Pain Management;  Laterality: Bilateral;    RADIOFREQUENCY ABLATION OF LUMBAR MEDIAL BRANCH NERVE AT SINGLE LEVEL Bilateral 2023    Procedure: Bilateral L4-5,5-S1 MB RFTC;  Surgeon: Carri Sanchez MD;  Location: [facility];  Service: Pain Management;  Laterality: Bilateral;    TUBAL LIGATION         Social History  Ms.  reports that she has quit smoking. Her smoking use included cigars. She has been exposed to tobacco smoke. She has never used smokeless tobacco. She reports current alcohol use. She reports that she does not use drugs.    Family History  Ms.'s family history includes Breast cancer in her paternal grandmother; Cancer in her paternal grandmother; Hypertension in her maternal grandmother and mother; Other in her mother.    Medications and Allergies     Medications  Outpatient Medications Marked as Taking for the 23 encounter (Office Visit) with Sultana Neil FNP   Medication Sig Dispense Refill    atorvastatin (LIPITOR) 10 MG tablet Take 1 tablet (10  mg total) by mouth every evening. 90 tablet 3    colchicine (COLCRYS) 0.6 mg tablet TAKE 1 TABLET BY MOUTH EVERY DAY 90 tablet 1    estradioL (ESTRACE) 1 MG tablet Take 1 mg by mouth once daily.         Allergies  Review of patient's allergies indicates:  No Known Allergies    Physical Examination     Vitals:    11/02/23 1459   BP: (!) 166/97   Patient Position: Sitting   Pulse: 88     Over the last two weeks how often have you been bothered by little interest or pleasure in doing things: 0  Over the last two weeks how often have you been bothered by feeling down, depressed or hopeless: 1  PHQ-2 Total Score: 1    As appeared on virtual  Physical Exam  Vitals reviewed.   Constitutional:       Appearance: Normal appearance. She is obese.   HENT:      Head: Normocephalic.      Right Ear: External ear normal.      Left Ear: External ear normal.   Eyes:      Extraocular Movements: Extraocular movements intact.   Musculoskeletal:         General: Normal range of motion.   Skin:     General: Skin is warm and dry.   Neurological:      General: No focal deficit present.      Mental Status: She is alert and oriented to person, place, and time.   Psychiatric:         Mood and Affect: Mood normal.         Behavior: Behavior normal.         Thought Content: Thought content normal.         Judgment: Judgment normal.           No visits with results within 14 Day(s) from this visit.   Latest known visit with results is:   Office Visit on 05/01/2023   Component Date Value Ref Range Status    POC Amphetamines 05/01/2023 Negative  Negative, Inconclusive Final    POC Barbiturates 05/01/2023 Negative  Negative, Inconclusive Final    POC Benzodiazepines 05/01/2023 Negative  Negative, Inconclusive Final    POC Cocaine 05/01/2023 Negative  Negative, Inconclusive Final    POC THC 05/01/2023 Negative  Negative, Inconclusive Final    POC Methadone 05/01/2023 Negative  Negative, Inconclusive Final    POC Methamphetamine 05/01/2023 Negative   Negative, Inconclusive Final    POC Opiates 05/01/2023 Negative  Negative, Inconclusive Final    POC Oxycodone 05/01/2023 Negative  Negative, Inconclusive Final    POC Phencyclidine 05/01/2023 Negative  Negative, Inconclusive Final    POC Methylenedioxymethamphetamine * 05/01/2023 Negative  Negative, Inconclusive Final    POC Tricyclic Antidepressants 05/01/2023 Negative  Negative, Inconclusive Final    POC Buprenorphine 05/01/2023 Negative   Final     Acceptable 05/01/2023 Yes   Final    POC Temperature (Urine) 05/01/2023 90   Final    pH, UA 05/01/2023 6.0  5.0 to 8.0 pH Units Final    Creatinine, Urine 05/01/2023 29  28 - 219 mg/dL Final    6-Acetylmorphine 05/01/2023 Negative  10 ng/mL Final    7-Aminoclonazepam 05/01/2023 Negative  Negative 25 ng/mL Final    a-Hydroxyalprazolam 05/01/2023 Negative  Negative 25 ng/mL Final    Acetyl Fentanyl 05/01/2023 Negative  2.5 ng/mL Final    Acetyl Norfentanyl Oxalate 05/01/2023 Negative  5 ng/mL Final    Benzoylecgonine 05/01/2023 Negative  100 ng/mL Final    Buprenorphine 05/01/2023 Negative  25 ng/mL Final    Codeine 05/01/2023 Negative  25 ng/mL Final    EDDP 05/01/2023 Negative  25 ng/mL Final    Fentanyl 05/01/2023 Negative  2.5 ng/mL Final    Hydrocodone 05/01/2023 Negative  25 ng/mL Final    Hydromorphone 05/01/2023 Negative  25 ng/mL Final    Lorazepam 05/01/2023 Negative  25 ng/mL Final    Morphine 05/01/2023 Negative  25 ng/mL Final    Norbuprenorphine 05/01/2023 Negative  25 ng/mL Final    Nordiazepam 05/01/2023 Negative  25 ng/mL Final    Norfentanyl Oxalate 05/01/2023 Negative  5 ng/mL Final    Norhydrocodone 05/01/2023 Negative  50 ng/mL Final    Noroxycodone HCL 05/01/2023 Negative  50 ng/mL Final    Oxazepam 05/01/2023 Negative  25 ng/mL Final    Oxymorphone 05/01/2023 Negative  25 ng/mL Final    Tapentadol 05/01/2023 Negative  25 ng/mL Final    Temazepam 05/01/2023 Negative  25 ng/mL Final    THC-COOH 05/01/2023 Negative  25 ng/mL Final     Tramadol 05/01/2023 Negative  100 ng/mL Final    Amphetamine, Urine 05/01/2023 Negative  Negative Final    Methamphetamines, Urine 05/01/2023 Negative  Negative Final    Methadone, Urine 05/01/2023 Negative  Negative 25 ng/mL Final    Oxycodone, Urine 05/01/2023 Negative  Negative 25 ng/mL Final    Specific Gravity, UA 05/01/2023 1.006  <=1.030 Final             Assessment and Plan (including Health Maintenance)         Plan:   Hypertension, unspecified type  -     losartan (COZAAR) 50 MG tablet; Take 1 tablet (50 mg total) by mouth once daily.  Dispense: 90 tablet; Refill: 3    Class 3 obesity  Comments:  heatlhy diet exercise recommended     3 mo follow up   Patient to message provider in 2 weeks with repeat blood pressure on new medication  There are no Patient Instructions on file for this visit.       Health Maintenance Due   Topic Date Due    COVID-19 Vaccine (1) Never done    Hemoglobin A1c (Prediabetes)  07/18/2023    Mammogram  11/04/2023       Most Recent Immunizations   Administered Date(s) Administered    Hepatitis B, Adult 01/10/2023    Influenza 10/20/2022    Tdap 10/25/2022    Varicella 01/10/2023        Problem List Items Addressed This Visit          Cardiac/Vascular    Hypertension - Primary    Relevant Medications    losartan (COZAAR) 50 MG tablet       Endocrine    Class 3 obesity       Health Maintenance Topics with due status: Not Due       Topic Last Completion Date    TETANUS VACCINE 10/25/2022       No future appointments.         Signature:  SHANNON Callaway  [facility]     Date of encounter: 11/2/23

## 2023-11-10 ENCOUNTER — HOSPITAL ENCOUNTER (OUTPATIENT)
Dept: RADIOLOGY | Facility: HOSPITAL | Age: 43
Discharge: HOME | End: 2023-11-10
Attending: NURSE PRACTITIONER
Payer: COMMERCIAL

## 2023-11-10 VITALS — WEIGHT: 270 LBS | BODY MASS INDEX: 43.58 KG/M2

## 2023-11-10 DIAGNOSIS — Z12.31 OTHER SCREENING MAMMOGRAM: ICD-10-CM

## 2023-11-10 PROCEDURE — 77067 SCR MAMMO BI INCL CAD: CPT | Mod: TC

## 2023-11-13 RX ORDER — ATORVASTATIN CALCIUM 10 MG/1
10 TABLET, FILM COATED ORAL NIGHTLY
Qty: 90 TABLET | Refills: 3 | Status: SHIPPED | OUTPATIENT
Start: 2023-11-13

## 2024-04-18 ENCOUNTER — OFFICE VISIT (OUTPATIENT)
Dept: FAMILY MEDICINE | Facility: CLINIC | Age: 44
End: 2024-04-18
Payer: COMMERCIAL

## 2024-04-18 VITALS
WEIGHT: 281.19 LBS | OXYGEN SATURATION: 95 % | HEIGHT: 66 IN | SYSTOLIC BLOOD PRESSURE: 110 MMHG | TEMPERATURE: 99 F | RESPIRATION RATE: 20 BRPM | DIASTOLIC BLOOD PRESSURE: 69 MMHG | BODY MASS INDEX: 45.19 KG/M2 | HEART RATE: 72 BPM

## 2024-04-18 DIAGNOSIS — R31.9 HEMATURIA, UNSPECIFIED TYPE: ICD-10-CM

## 2024-04-18 DIAGNOSIS — Z00.00 ROUTINE GENERAL MEDICAL EXAMINATION AT A HEALTH CARE FACILITY: Primary | ICD-10-CM

## 2024-04-18 DIAGNOSIS — R73.03 PREDIABETES: ICD-10-CM

## 2024-04-18 DIAGNOSIS — E66.01 CLASS 3 SEVERE OBESITY DUE TO EXCESS CALORIES WITH BODY MASS INDEX (BMI) OF 45.0 TO 49.9 IN ADULT, UNSPECIFIED WHETHER SERIOUS COMORBIDITY PRESENT: ICD-10-CM

## 2024-04-18 DIAGNOSIS — E55.9 VITAMIN D DEFICIENCY: ICD-10-CM

## 2024-04-18 DIAGNOSIS — R76.8 ANA POSITIVE: ICD-10-CM

## 2024-04-18 DIAGNOSIS — Z13.1 SCREENING FOR DIABETES MELLITUS: ICD-10-CM

## 2024-04-18 DIAGNOSIS — Z78.0 POST-MENOPAUSAL: ICD-10-CM

## 2024-04-18 DIAGNOSIS — M10.9 GOUT, UNSPECIFIED CAUSE, UNSPECIFIED CHRONICITY, UNSPECIFIED SITE: ICD-10-CM

## 2024-04-18 DIAGNOSIS — E78.5 HYPERLIPIDEMIA, UNSPECIFIED HYPERLIPIDEMIA TYPE: ICD-10-CM

## 2024-04-18 DIAGNOSIS — R60.9 FLUID RETENTION: ICD-10-CM

## 2024-04-18 DIAGNOSIS — I10 HYPERTENSION, UNSPECIFIED TYPE: ICD-10-CM

## 2024-04-18 DIAGNOSIS — M25.50 ARTHRALGIA, UNSPECIFIED JOINT: ICD-10-CM

## 2024-04-18 DIAGNOSIS — Z13.220 SCREENING FOR LIPID DISORDERS: ICD-10-CM

## 2024-04-18 PROBLEM — M53.3 DISORDER OF SACRUM: Chronic | Status: RESOLVED | Noted: 2023-08-30 | Resolved: 2024-04-18

## 2024-04-18 PROBLEM — M54.41 ACUTE BILATERAL LOW BACK PAIN WITH RIGHT-SIDED SCIATICA: Status: RESOLVED | Noted: 2023-04-12 | Resolved: 2024-04-18

## 2024-04-18 PROBLEM — M54.40 ACUTE LEFT-SIDED LOW BACK PAIN WITH SCIATICA: Status: RESOLVED | Noted: 2023-02-23 | Resolved: 2024-04-18

## 2024-04-18 PROBLEM — E66.813 CLASS 3 OBESITY: Status: RESOLVED | Noted: 2023-11-02 | Resolved: 2024-04-18
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
## 2024-04-18 PROBLEM — E66.813 CLASS 3 SEVERE OBESITY WITH BODY MASS INDEX (BMI) OF 45.0 TO 49.9 IN ADULT: Status: ACTIVE | Noted: 2022-05-20

## 2024-04-18 LAB
ALBUMIN SERPL BCP-MCNC: 3.9 G/DL (ref 3.5–5)
ALBUMIN/GLOB SERPL: 1 {RATIO}
ALP SERPL-CCNC: 81 U/L (ref 37–98)
ALT SERPL W P-5'-P-CCNC: 24 U/L (ref 13–56)
ANION GAP SERPL CALCULATED.3IONS-SCNC: 14 MMOL/L (ref 7–16)
AST SERPL W P-5'-P-CCNC: 6 U/L (ref 15–37)
BASOPHILS # BLD AUTO: 0.04 K/UL (ref 0–0.2)
BASOPHILS NFR BLD AUTO: 0.4 % (ref 0–1)
BILIRUB SERPL-MCNC: 0.3 MG/DL (ref ?–1.2)
BILIRUB SERPL-MCNC: NEGATIVE MG/DL
BLOOD URINE, POC: ABNORMAL
BUN SERPL-MCNC: 12 MG/DL (ref 7–18)
BUN/CREAT SERPL: 11 (ref 6–20)
CALCIUM SERPL-MCNC: 10.1 MG/DL (ref 8.5–10.1)
CHLORIDE SERPL-SCNC: 104 MMOL/L (ref 98–107)
CHOLEST SERPL-MCNC: 162 MG/DL (ref 0–200)
CHOLEST/HDLC SERPL: 3.2 {RATIO}
CO2 SERPL-SCNC: 26 MMOL/L (ref 21–32)
COLOR, POC UA: YELLOW
CREAT SERPL-MCNC: 1.1 MG/DL (ref 0.55–1.02)
DIFFERENTIAL METHOD BLD: ABNORMAL
EGFR (NO RACE VARIABLE) (RUSH/TITUS): 64 ML/MIN/1.73M2
EOSINOPHIL # BLD AUTO: 0.12 K/UL (ref 0–0.5)
EOSINOPHIL NFR BLD AUTO: 1.3 % (ref 1–4)
ERYTHROCYTE [DISTWIDTH] IN BLOOD BY AUTOMATED COUNT: 14.9 % (ref 11.5–14.5)
EST. AVERAGE GLUCOSE BLD GHB EST-MCNC: 123 MG/DL
GLOBULIN SER-MCNC: 3.9 G/DL (ref 2–4)
GLUCOSE SERPL-MCNC: 100 MG/DL (ref 74–106)
GLUCOSE UR QL STRIP: NEGATIVE
HBA1C MFR BLD HPLC: 5.9 % (ref 4.5–6.6)
HCT VFR BLD AUTO: 41.1 % (ref 38–47)
HDLC SERPL-MCNC: 50 MG/DL (ref 40–60)
HGB BLD-MCNC: 12.9 G/DL (ref 12–16)
IMM GRANULOCYTES # BLD AUTO: 0.02 K/UL (ref 0–0.04)
IMM GRANULOCYTES NFR BLD: 0.2 % (ref 0–0.4)
KETONES UR QL STRIP: NEGATIVE
LDLC SERPL CALC-MCNC: 62 MG/DL
LDLC/HDLC SERPL: 1.2 {RATIO}
LEUKOCYTE ESTERASE URINE, POC: NEGATIVE
LYMPHOCYTES # BLD AUTO: 3.27 K/UL (ref 1–4.8)
LYMPHOCYTES NFR BLD AUTO: 36.3 % (ref 27–41)
MCH RBC QN AUTO: 28 PG (ref 27–31)
MCHC RBC AUTO-ENTMCNC: 31.4 G/DL (ref 32–36)
MCV RBC AUTO: 89.2 FL (ref 80–96)
MONOCYTES # BLD AUTO: 0.44 K/UL (ref 0–0.8)
MONOCYTES NFR BLD AUTO: 4.9 % (ref 2–6)
MPC BLD CALC-MCNC: 10.6 FL (ref 9.4–12.4)
NEUTROPHILS # BLD AUTO: 5.12 K/UL (ref 1.8–7.7)
NEUTROPHILS NFR BLD AUTO: 56.9 % (ref 53–65)
NITRITE, POC UA: NEGATIVE
NONHDLC SERPL-MCNC: 112 MG/DL
NRBC # BLD AUTO: 0 X10E3/UL
NRBC, AUTO (.00): 0 %
PH, POC UA: 6.5
PLATELET # BLD AUTO: 348 K/UL (ref 150–400)
POTASSIUM SERPL-SCNC: 4.2 MMOL/L (ref 3.5–5.1)
PROT SERPL-MCNC: 7.8 G/DL (ref 6.4–8.2)
PROTEIN, POC: NEGATIVE
RBC # BLD AUTO: 4.61 M/UL (ref 4.2–5.4)
SODIUM SERPL-SCNC: 140 MMOL/L (ref 136–145)
SPECIFIC GRAVITY, POC UA: 1.02
TRIGL SERPL-MCNC: 248 MG/DL (ref 35–150)
TSH SERPL DL<=0.005 MIU/L-ACNC: 2.18 UIU/ML (ref 0.36–3.74)
URATE SERPL-MCNC: 6.6 MG/DL (ref 2.6–6)
UROBILINOGEN, POC UA: 0.2
VLDLC SERPL-MCNC: 50 MG/DL
WBC # BLD AUTO: 9.01 K/UL (ref 4.5–11)

## 2024-04-18 PROCEDURE — 80061 LIPID PANEL: CPT | Mod: ,,, | Performed by: CLINICAL MEDICAL LABORATORY

## 2024-04-18 PROCEDURE — 84550 ASSAY OF BLOOD/URIC ACID: CPT | Mod: ,,, | Performed by: CLINICAL MEDICAL LABORATORY

## 2024-04-18 PROCEDURE — 3074F SYST BP LT 130 MM HG: CPT | Mod: ,,, | Performed by: NURSE PRACTITIONER

## 2024-04-18 PROCEDURE — 3078F DIAST BP <80 MM HG: CPT | Mod: ,,, | Performed by: NURSE PRACTITIONER

## 2024-04-18 PROCEDURE — 1160F RVW MEDS BY RX/DR IN RCRD: CPT | Mod: ,,, | Performed by: NURSE PRACTITIONER

## 2024-04-18 PROCEDURE — 4010F ACE/ARB THERAPY RXD/TAKEN: CPT | Mod: ,,, | Performed by: NURSE PRACTITIONER

## 2024-04-18 PROCEDURE — 80050 GENERAL HEALTH PANEL: CPT | Mod: ,,, | Performed by: CLINICAL MEDICAL LABORATORY

## 2024-04-18 PROCEDURE — 99396 PREV VISIT EST AGE 40-64: CPT | Mod: ,,, | Performed by: NURSE PRACTITIONER

## 2024-04-18 PROCEDURE — 3008F BODY MASS INDEX DOCD: CPT | Mod: ,,, | Performed by: NURSE PRACTITIONER

## 2024-04-18 PROCEDURE — 81003 URINALYSIS AUTO W/O SCOPE: CPT | Mod: QW,,, | Performed by: NURSE PRACTITIONER

## 2024-04-18 PROCEDURE — 83036 HEMOGLOBIN GLYCOSYLATED A1C: CPT | Mod: ,,, | Performed by: CLINICAL MEDICAL LABORATORY

## 2024-04-18 PROCEDURE — 1159F MED LIST DOCD IN RCRD: CPT | Mod: ,,, | Performed by: NURSE PRACTITIONER

## 2024-04-18 RX ORDER — SPIRONOLACTONE 25 MG/1
25 TABLET ORAL DAILY
Qty: 30 TABLET | Refills: 11 | Status: SHIPPED | OUTPATIENT
Start: 2024-04-18 | End: 2025-04-18

## 2024-04-18 RX ORDER — COLCHICINE 0.6 MG/1
0.6 TABLET ORAL DAILY
Qty: 90 TABLET | Refills: 3 | Status: SHIPPED | OUTPATIENT
Start: 2024-04-18 | End: 2024-06-12 | Stop reason: ALTCHOICE

## 2024-04-18 RX ORDER — ESTRADIOL 1 MG/1
1 TABLET ORAL DAILY
Qty: 90 TABLET | Refills: 3 | Status: SHIPPED | OUTPATIENT
Start: 2024-04-18 | End: 2025-04-13

## 2024-04-18 RX ORDER — ATORVASTATIN CALCIUM 10 MG/1
10 TABLET, FILM COATED ORAL NIGHTLY
Qty: 90 TABLET | Refills: 3 | Status: SHIPPED | OUTPATIENT
Start: 2024-04-18 | End: 2024-06-11

## 2024-04-18 NOTE — LETTER
April 18, 2024    Humera Guerrero  [address] [place] [address] [place] [address]             [facility]  Family Medicine  [address] [place] [address] [address] [place] [address]  Phone: [phone]  Fax: [phone]   April 18, 2024     Patient: Humera Guerrero   YOB: 1980   Date of Visit: 4/18/2024       To Whom it May Concern:    Humera Guerrero was seen in my clinic on 4/18/2024. She may return to work on 4/18/2024 .    Please excuse her from any classes or work missed.    If you have any questions or concerns, please don't hesitate to call.    Sincerely,         Sultana Neil, CLEMENTINAP

## 2024-04-18 NOTE — PROGRESS NOTES
Subjective     Patient ID: Humera Guerrero is a 43 y.o. female.    Chief Complaint: Hypertension, Hyperlipidemia, Weight Check, and Healthy You (Patient presents to the clinic for healthy you)    Hypertension  Pertinent negatives include no chest pain, headaches, neck pain or palpitations.   Hyperlipidemia  Pertinent negatives include no chest pain.       Humera Guerrero is a 43 y.o. female who presents for routine wellness visit. Pt states she is doing well , continue joint pains. Also reports some urinary incontinence.  Hx of positive CORAL. Pt denies  FHx of breast, colon, or cervical cancer. Last colonoscopy was never with 45 year old recs. Last mammogram was 11/10/2023 scheduled for 11/15/2024. Last pap was 4/10/2023 repeat in 3 years. . Nonsmoker. Working on improving diet and exercise.  20-30 lb weight gain last year  Review of Systems   Constitutional:  Negative for activity change and unexpected weight change.   HENT:  Negative for hearing loss, rhinorrhea and trouble swallowing.    Eyes:  Negative for discharge and visual disturbance.   Respiratory:  Negative for chest tightness and wheezing.    Cardiovascular:  Negative for chest pain and palpitations.   Gastrointestinal:  Negative for blood in stool, constipation, diarrhea and vomiting.   Endocrine: Negative for polydipsia and polyuria.   Genitourinary:  Negative for difficulty urinating, dysuria, hematuria and menstrual problem.   Musculoskeletal:  Positive for arthralgias and joint swelling. Negative for neck pain.   Neurological:  Negative for weakness and headaches.   Psychiatric/Behavioral:  Negative for confusion and dysphoric mood.        Tobacco Use: Medium Risk (4/18/2024)    Patient History     Smoking Tobacco Use: Former     Smokeless Tobacco Use: Never     Passive Exposure: Past     Review of patient's allergies indicates:  No Known Allergies  Current Outpatient Medications   Medication Instructions    atorvastatin (LIPITOR) 10 mg, Oral,  Nightly    colchicine (COLCRYS) 0.6 mg, Oral, Daily    estradioL (ESTRACE) 1 mg, Oral, Daily    losartan (COZAAR) 50 mg, Oral, Daily    spironolactone (ALDACTONE) 25 mg, Oral, Daily     Medications Discontinued During This Encounter   Medication Reason    estradioL (ESTRACE) 1 MG tablet Reorder    colchicine (COLCRYS) 0.6 mg tablet Reorder    atorvastatin (LIPITOR) 10 MG tablet Reorder    sertraline (ZOLOFT) 25 MG tablet Patient no longer taking       Past Medical History:   Diagnosis Date    Cerebral cyst     Other hyperlipidemia     Prediabetes     Vitamin D insufficiency      Health Maintenance Topics with due status: Not Due       Topic Last Completion Date    TETANUS VACCINE 10/25/2022    Mammogram 11/10/2023     Immunization History   Administered Date(s) Administered    Hepatitis B, Adult 01/10/2023    Influenza 10/20/2022    Influenza - Quadrivalent - PF *Preferred* (6 months and older) 10/23/2023    MMR 10/23/2023    Tdap 10/25/2022    Varicella 01/10/2023     Past Surgical History:   Procedure Laterality Date     SECTION      HYSTERECTOMY Bilateral     INJECTION OF ANESTHETIC AGENT AROUND MEDIAL BRANCH NERVES INNERVATING LUMBAR FACET JOINT Bilateral 2023    Procedure: Bilateral L4-5,5-S1 MBB;  Surgeon: Carri Sanchez MD;  Location: [facility];  Service: Pain Management;  Laterality: Bilateral;    INJECTION OF ANESTHETIC AGENT AROUND MEDIAL BRANCH NERVES INNERVATING LUMBAR FACET JOINT Bilateral 2023    Procedure: Bilateral L4-5, 5-S1 medial branch block #2;  Surgeon: Carri Sanchez MD;  Location: [facility];  Service: Pain Management;  Laterality: Bilateral;    OOPHORECTOMY      RADIOFREQUENCY ABLATION OF LUMBAR MEDIAL BRANCH NERVE AT SINGLE LEVEL Bilateral 2023    Procedure: Bilateral L4-5,5-S1 MB RFTC;  Surgeon: Carri Sanchez MD;  Location: [facility];  Service: Pain Management;  Laterality: Bilateral;    TUBAL LIGATION         Objective     Body  "mass index is 45.39 kg/m².  Wt Readings from Last 3 Encounters:   04/18/24 127.6 kg (281 lb 3.2 oz)   11/10/23 122.5 kg (270 lb)   08/30/23 126.1 kg (278 lb)     Ht Readings from Last 3 Encounters:   04/18/24 5' 6" (1.676 m)   08/30/23 5' 6" (1.676 m)   08/17/23 5' 6" (1.676 m)     BP Readings from Last 3 Encounters:   04/18/24 110/69   11/02/23 (!) 166/97   08/30/23 (!) 144/81     Temp Readings from Last 3 Encounters:   04/18/24 98.5 °F (36.9 °C) (Oral)   08/17/23 98 °F (36.7 °C)   07/11/23 98.7 °F (37.1 °C) (Oral)     Pulse Readings from Last 3 Encounters:   04/18/24 72   11/02/23 88   08/30/23 86     Resp Readings from Last 3 Encounters:   04/18/24 20   08/30/23 16   08/17/23 17     PF Readings from Last 3 Encounters:   No data found for PF       Physical Exam  Vitals reviewed.   Constitutional:       Appearance: Normal appearance. She is obese.   HENT:      Head: Normocephalic.      Right Ear: External ear normal.      Left Ear: External ear normal.      Nose: Nose normal.      Mouth/Throat:      Mouth: Mucous membranes are moist.   Eyes:      Extraocular Movements: Extraocular movements intact.   Cardiovascular:      Rate and Rhythm: Normal rate and regular rhythm.      Pulses: Normal pulses.      Heart sounds: Normal heart sounds.   Pulmonary:      Effort: Pulmonary effort is normal. No respiratory distress.      Breath sounds: Normal breath sounds. No stridor. No wheezing, rhonchi or rales.   Chest:      Chest wall: No tenderness.   Abdominal:      General: Bowel sounds are normal.      Palpations: Abdomen is soft.   Musculoskeletal:         General: Normal range of motion.      Cervical back: Normal range of motion.   Skin:     General: Skin is warm and dry.      Capillary Refill: Capillary refill takes less than 2 seconds.   Neurological:      General: No focal deficit present.      Mental Status: She is alert and oriented to person, place, and time.   Psychiatric:         Mood and Affect: Mood normal.    "      Behavior: Behavior normal.         Thought Content: Thought content normal.         Judgment: Judgment normal.         Assessment and Plan     Problem List Items Addressed This Visit          Cardiac/Vascular    Hyperlipidemia     Continue atorvostatin         Relevant Medications    atorvastatin (LIPITOR) 10 MG tablet    Other Relevant Orders    Lipid Panel    Hypertension     Well controlled continue losartan, spironolactone added for fluid retention         Relevant Orders    Comprehensive Metabolic Panel    CBC Auto Differential       Endocrine    Class 3 severe obesity with body mass index (BMI) of 45.0 to 49.9 in adult    Relevant Orders    TSH    Prediabetes    Relevant Orders    Hemoglobin A1C    Vitamin D deficiency       Orthopedic    Gout     Colchicine daily         Relevant Medications    colchicine (COLCRYS) 0.6 mg tablet    Other Relevant Orders    Uric Acid     Other Visit Diagnoses       Routine general medical examination at a health care facility    -  Primary    Relevant Orders    Comprehensive Metabolic Panel    CBC Auto Differential    Hemoglobin A1C    Lipid Panel    Uric Acid    TSH    POCT URINALYSIS W/O SCOPE (Completed)    Screening for diabetes mellitus        Relevant Orders    Hemoglobin A1C    Screening for lipid disorders        Relevant Orders    Lipid Panel    CORAL positive        Relevant Orders    Ambulatory referral/consult to Rheumatology    Arthralgia, unspecified joint        Relevant Orders    Ambulatory referral/consult to Rheumatology    Fluid retention        Relevant Medications    spironolactone (ALDACTONE) 25 MG tablet    Post-menopausal        Relevant Medications    estradioL (ESTRACE) 1 MG tablet    Hematuria, unspecified type        Relevant Orders    Urine culture            Plan: exercise healthy diet lose weight      I have reviewed the medications, allergies, and problem list.     Goal Actions:    What type of visit is the patient here for today?: Healthy  You  Does the patient consent to enroll in Color Me Healthy?: No  Is this a Wellness Follow Up?: No  What is your overall wellness goal? (select at least one): Lose weight, Improve overall health  Choose 3: Biometric, Nutrition, Exercise  Biometric Actions: BMI>30 lose 1-2 lbs per week, SBP<120 and DBP<80  Nurtrition Actions: Eat heart healthy diet, Recommend weight loss, Eat a well-balanced diet, drink 8-10 glasses of water per day, Decrease intake of fast food  Exercise Actions: Recommend physical activity 30 minutes per day 3-5 times/week      Total time spent face-to-face and non-face-to-face coordinating care for this encounter was: >30 min    Chronic conditions status updated as per HPI.  Other than changes above, cont current medications and maintain follow up with specialists.  Return to clinic 1 year next wellness, 3 mo htn follow up.    Sultana HUTSON

## 2024-04-19 ENCOUNTER — PATIENT OUTREACH (OUTPATIENT)
Dept: ADMINISTRATIVE | Facility: HOSPITAL | Age: 44
End: 2024-04-19
Payer: COMMERCIAL

## 2024-04-19 NOTE — PROGRESS NOTES
Population Health Chart Review & Patient Outreach Details      Further Action Needed If Patient Returns Outreach:            Updates Requested / Reviewed:     []  Care Everywhere    []     []  External Sources ([facility], [facility], [facility], etc.)    [] [facility]   [] [facility]   [] Other:    []  Care Team Updated   []  Removed  or Duplicate Orders   []  Immunization Reconciliation Completed / Queried    [] [place]   [] [place]   [] [place]   [] [place]      Health Maintenance Topics Addressed and Outreach Outcomes / Actions Taken:             Breast Cancer Screening []  Mammogram Order Placed    []  Mammogram Screening Scheduled    []  External Records Requested & Care Team Updated if Applicable    []  External Records Uploaded & Care Team Updated if Applicable    []  Pt Declined Scheduling Mammogram    []  Pt Will Schedule with External Provider / Order Routed & Care Team Updated if Applicable              Cervical Cancer Screening []  Pap Smear Scheduled in Primary Care or OBGYN    []  External Records Requested & Care Team Updated if Applicable       []  External Records Uploaded, Care Team Updated, & History Updated if Applicable    []  Patient Declined Scheduling Pap Smear    []  Patient Will Schedule with External Provider & Care Team Updated if Applicable                  Colorectal Cancer Screening []  Colonoscopy Case Request / Referral / Home Test Order Placed    []  External Records Requested & Care Team Updated if Applicable    []  External Records Uploaded, Care Team Updated, & History Updated if Applicable    []  Patient Declined Completing Colon Cancer Screening    []  Patient Will Schedule with External Provider & Care Team Updated if Applicable    []  Fit Kit Mailed (add the SmartPhrase under additional notes)    []  Reminded Patient to Complete Home Test                Diabetic Eye Exam []  Eye Exam Screening Order Placed    []  Eye Camera Scheduled or Optometry/Ophthalmology Referral  Placed    []  External Records Requested & Care Team Updated if Applicable    []  External Records Uploaded, Care Team Updated, & History Updated if Applicable    []  Patient Declined Scheduling Eye Exam    []  Patient Will Schedule with External Provider & Care Team Updated if Applicable             Blood Pressure Control []  Primary Care Follow Up Visit Scheduled     []  Remote Blood Pressure Reading Captured    []  Patient Declined Remote Reading or Scheduling Appt - Escalated to PCP    []  Patient Will Call Back or Send Portal Message with Reading                 HbA1c & Other Labs []  Overdue Lab(s) Ordered    []  Overdue Lab(s) Scheduled    []  External Records Uploaded & Care Team Updated if Applicable    []  Primary Care Follow Up Visit Scheduled     []  Reminded Patient to Complete A1c Home Test    []  Patient Declined Scheduling Labs or Will Call Back to Schedule    []  Patient Will Schedule with External Provider / Order Routed, & Care Team Updated if Applicable           Primary Care Appointment []  Primary Care Appt Scheduled    []  Patient Declined Scheduling or Will Call Back to Schedule    []  Pt Established with External Provider, Updated Care Team, & Informed Pt to Notify Payor if Applicable           Medication Adherence /    Statin Use []  Primary Care Appointment Scheduled    []  Patient Reminded to  Prescription    []  Patient Declined, Provider Notified if Needed    []  Sent Provider Message to Review to Evaluate Pt for Statin, Add Exclusion Dx Codes, Document   Exclusion in Problem List, Change Statin Intensity Level to Moderate or High Intensity if Applicable                Osteoporosis Screening []  Dexa Order Placed    []  Dexa Appointment Scheduled    []  External Records Requested & Care Team Updated    []  External Records Uploaded, Care Team Updated, & History Updated if Applicable    []  Patient Declined Scheduling Dexa or Will Call Back to Schedule    []  Patient Will Schedule  with External Provider / Order Routed & Care Team Updated if Applicable       Additional Notes:.  Post visit Population Health review of encounter with date of service  4/18/24 with Rashaad.  All required HY components in encounter.    Followup appt for: Pt needs appt for 2025 Hy  sent to PES to schedule via staff message.

## 2024-06-06 ENCOUNTER — TELEPHONE (OUTPATIENT)
Dept: PAIN MEDICINE | Facility: CLINIC | Age: 44
End: 2024-06-06
Payer: COMMERCIAL

## 2024-06-06 NOTE — TELEPHONE ENCOUNTER
----- Message from Nikki Granda sent at 6/6/2024  8:51 AM CDT -----  Regarding: pt needs to speak with nurse  Pt is requesting a cammie from nurse please call pt back at [phone]    JENNIFER BIRD   06/06/2024   2:42 PM    Patient requesting earlier appt offered 6/20/24, this did not work for patient, placed on call list.

## 2024-06-11 DIAGNOSIS — E78.5 HYPERLIPIDEMIA, UNSPECIFIED HYPERLIPIDEMIA TYPE: ICD-10-CM

## 2024-06-11 RX ORDER — ATORVASTATIN CALCIUM 20 MG/1
20 TABLET, FILM COATED ORAL DAILY
Qty: 90 TABLET | Refills: 3 | Status: SHIPPED | OUTPATIENT
Start: 2024-06-11 | End: 2025-06-11

## 2024-06-12 RX ORDER — MELOXICAM 15 MG/1
15 TABLET ORAL DAILY
Qty: 90 TABLET | Refills: 3 | Status: SHIPPED | OUTPATIENT
Start: 2024-06-12 | End: 2025-06-07

## 2024-06-12 RX ORDER — ALLOPURINOL 300 MG/1
300 TABLET ORAL DAILY
Qty: 90 TABLET | Refills: 3 | Status: SHIPPED | OUTPATIENT
Start: 2024-06-12 | End: 2025-06-07

## 2024-06-13 ENCOUNTER — TELEPHONE (OUTPATIENT)
Dept: FAMILY MEDICINE | Facility: CLINIC | Age: 44
End: 2024-06-13
Payer: COMMERCIAL

## 2024-06-13 NOTE — TELEPHONE ENCOUNTER
----- Message from Tianna Paige sent at 6/13/2024  9:23 AM CDT -----  Returned call back [phone]

## 2024-06-13 NOTE — TELEPHONE ENCOUNTER
----- Message from Tianna Paige sent at 6/13/2024  7:45 AM CDT -----  Pt called and stated that sukumar changed her meds and she needs to know what she needs to do with the other.. finish it or stop taking it call back [phone]

## 2024-07-23 ENCOUNTER — OFFICE VISIT (OUTPATIENT)
Dept: FAMILY MEDICINE | Facility: CLINIC | Age: 44
End: 2024-07-23
Payer: COMMERCIAL

## 2024-07-23 VITALS
SYSTOLIC BLOOD PRESSURE: 118 MMHG | DIASTOLIC BLOOD PRESSURE: 73 MMHG | BODY MASS INDEX: 44.84 KG/M2 | RESPIRATION RATE: 20 BRPM | HEART RATE: 78 BPM | WEIGHT: 279 LBS | HEIGHT: 66 IN | OXYGEN SATURATION: 98 % | TEMPERATURE: 98 F

## 2024-07-23 DIAGNOSIS — R73.03 PREDIABETES: ICD-10-CM

## 2024-07-23 DIAGNOSIS — I10 HYPERTENSION, UNSPECIFIED TYPE: ICD-10-CM

## 2024-07-23 DIAGNOSIS — E66.01 CLASS 3 SEVERE OBESITY DUE TO EXCESS CALORIES WITH BODY MASS INDEX (BMI) OF 45.0 TO 49.9 IN ADULT, UNSPECIFIED WHETHER SERIOUS COMORBIDITY PRESENT: ICD-10-CM

## 2024-07-23 DIAGNOSIS — E78.5 HYPERLIPIDEMIA, UNSPECIFIED HYPERLIPIDEMIA TYPE: Primary | ICD-10-CM

## 2024-07-23 DIAGNOSIS — M10.9 GOUT, UNSPECIFIED CAUSE, UNSPECIFIED CHRONICITY, UNSPECIFIED SITE: ICD-10-CM

## 2024-07-23 DIAGNOSIS — R76.8 ANA POSITIVE: ICD-10-CM

## 2024-07-23 PROCEDURE — 3078F DIAST BP <80 MM HG: CPT | Mod: ,,, | Performed by: NURSE PRACTITIONER

## 2024-07-23 PROCEDURE — 3074F SYST BP LT 130 MM HG: CPT | Mod: ,,, | Performed by: NURSE PRACTITIONER

## 2024-07-23 PROCEDURE — 3008F BODY MASS INDEX DOCD: CPT | Mod: ,,, | Performed by: NURSE PRACTITIONER

## 2024-07-23 PROCEDURE — 4010F ACE/ARB THERAPY RXD/TAKEN: CPT | Mod: ,,, | Performed by: NURSE PRACTITIONER

## 2024-07-23 PROCEDURE — 1159F MED LIST DOCD IN RCRD: CPT | Mod: ,,, | Performed by: NURSE PRACTITIONER

## 2024-07-23 PROCEDURE — 1160F RVW MEDS BY RX/DR IN RCRD: CPT | Mod: ,,, | Performed by: NURSE PRACTITIONER

## 2024-07-23 PROCEDURE — 3044F HG A1C LEVEL LT 7.0%: CPT | Mod: ,,, | Performed by: NURSE PRACTITIONER

## 2024-07-23 PROCEDURE — 99212 OFFICE O/P EST SF 10 MIN: CPT | Mod: ,,, | Performed by: NURSE PRACTITIONER

## 2024-07-23 NOTE — PROGRESS NOTES
SHANNON Callaway        PATIENT NAME: Humera Guerrero  : 1980  DATE: 24  MRN: [id number]      Patient PCP Information       Provider PCP Type    Sultana SARAHY SHANNON Neil [facility]            Reason for Visit / Chief Complaint: Follow-up and Hypertension (Pt presents to the clinic for 3m f/u)       Update PCP  Update Chief Complaint         History of Present Illness / Problem Focused Workflow     Humera Guerrero presents to the clinic with Follow-up and Hypertension (Pt presents to the clinic for 3m f/u)     HPI  Ms Guerrero presents to clinic for follow up. Improvement noted in lower ext swelling and knee pain since starting fluid pill and switching to allopurinol. Patient BP stable. Has appt for Rheumatology in coming month. Patient with positive CORAL negative RF, chronic joint pain.       Medical / Social / Family History     Past Medical History:   Diagnosis Date    Cerebral cyst     Other hyperlipidemia     Prediabetes     Vitamin D insufficiency        Past Surgical History:   Procedure Laterality Date     SECTION      HYSTERECTOMY Bilateral     INJECTION OF ANESTHETIC AGENT AROUND MEDIAL BRANCH NERVES INNERVATING LUMBAR FACET JOINT Bilateral 2023    Procedure: Bilateral L4-5,5-S1 MBB;  Surgeon: Carri Sanchez MD;  [facility];  Service: Pain Management;  Laterality: Bilateral;    INJECTION OF ANESTHETIC AGENT AROUND MEDIAL BRANCH NERVES INNERVATING LUMBAR FACET JOINT Bilateral 2023    Procedure: Bilateral L4-5, 5-S1 medial branch block #2;  Surgeon: Carri Sanchez MD;  [facility]: [facility];  Service: Pain Management;  Laterality: Bilateral;    OOPHORECTOMY      RADIOFREQUENCY ABLATION OF LUMBAR MEDIAL BRANCH NERVE AT SINGLE LEVEL Bilateral 2023    Procedure: Bilateral L4-5,5-S1 MB RFTC;  Surgeon: Carri Sanchez MD;  [facility]: [facility];  Service: Pain Management;  Laterality: Bilateral;    TUBAL LIGATION         Social History  Ms.   "reports that she has quit smoking. Her smoking use included cigars. She has been exposed to tobacco smoke. She has never used smokeless tobacco. She reports current alcohol use. She reports that she does not use drugs.    Family History  Ms.'s family history includes Breast cancer in her paternal grandmother; Cancer in her paternal grandmother; Hypertension in her maternal grandmother and mother; Other in her mother.    Medications and Allergies     Medications  Outpatient Medications Marked as Taking for the 7/23/24 encounter (Office Visit) with Sultana Neil FNP   Medication Sig Dispense Refill    allopurinoL (ZYLOPRIM) 300 MG tablet Take 1 tablet (300 mg total) by mouth once daily. 90 tablet 3    atorvastatin (LIPITOR) 20 MG tablet Take 1 tablet (20 mg total) by mouth once daily. 90 tablet 3    estradioL (ESTRACE) 1 MG tablet Take 1 tablet (1 mg total) by mouth once daily. 90 tablet 3    losartan (COZAAR) 50 MG tablet Take 1 tablet (50 mg total) by mouth once daily. 90 tablet 3    meloxicam (MOBIC) 15 MG tablet Take 1 tablet (15 mg total) by mouth once daily. 90 tablet 3    spironolactone (ALDACTONE) 25 MG tablet Take 1 tablet (25 mg total) by mouth once daily. 30 tablet 11       Allergies  Review of patient's allergies indicates:  No Known Allergies    Physical Examination     Vitals:    07/23/24 1305   BP: 118/73   BP Location: Right arm   Patient Position: Sitting   BP Method: Large (Automatic)   Pulse: 78   Resp: 20   Temp: 97.9 °F (36.6 °C)   TempSrc: Oral   SpO2: 98%   Weight: 126.6 kg (279 lb)   Height: 5' 6" (1.676 m)       Physical Exam  Vitals reviewed.   Constitutional:       Appearance: Normal appearance.   HENT:      Head: Normocephalic.      Right Ear: Tympanic membrane, ear canal and external ear normal.      Left Ear: Tympanic membrane, ear canal and external ear normal.      Nose: Nose normal.      Mouth/Throat:      Mouth: Mucous membranes are moist.   Eyes:      Extraocular Movements: " Extraocular movements intact.   Cardiovascular:      Rate and Rhythm: Normal rate and regular rhythm.      Pulses: Normal pulses.      Heart sounds: Normal heart sounds.   Pulmonary:      Effort: Pulmonary effort is normal.   Musculoskeletal:         General: Normal range of motion.      Cervical back: Normal range of motion.   Skin:     General: Skin is warm.      Capillary Refill: Capillary refill takes less than 2 seconds.   Neurological:      General: No focal deficit present.      Mental Status: She is alert and oriented to person, place, and time.   Psychiatric:         Mood and Affect: Mood normal.         Behavior: Behavior normal.         Thought Content: Thought content normal.         Judgment: Judgment normal.           No visits with results within 14 Day(s) from this visit.   Latest known visit with results is:   Office Visit on 04/18/2024   Component Date Value Ref Range Status    Color, UA 04/18/2024 Yellow   Final    Spec Grav UA 04/18/2024 1.025   Final    pH, UA 04/18/2024 6.5   Final    WBC, UA 04/18/2024 Negative   Final    Nitrite, UA 04/18/2024 Negative   Final    Protein, POC 04/18/2024 Negative   Final    Glucose, UA 04/18/2024 Negative   Final    Ketones, UA 04/18/2024 Negative   Final    Bilirubin, POC 04/18/2024 Negative   Final    Urobilinogen, UA 04/18/2024 0.2   Final    Blood, UA 04/18/2024 Moderate   Final    Sodium 04/18/2024 140  136 - 145 mmol/L Final    Potassium 04/18/2024 4.2  3.5 - 5.1 mmol/L Final    Chloride 04/18/2024 104  98 - 107 mmol/L Final    CO2 04/18/2024 26  21 - 32 mmol/L Final    Anion Gap 04/18/2024 14  7 - 16 mmol/L Final    Glucose 04/18/2024 100  74 - 106 mg/dL Final    BUN 04/18/2024 12  7 - 18 mg/dL Final    Creatinine 04/18/2024 1.10 (H)  0.55 - 1.02 mg/dL Final    BUN/Creatinine Ratio 04/18/2024 11  6 - 20 Final    Calcium 04/18/2024 10.1  8.5 - 10.1 mg/dL Final    Total Protein 04/18/2024 7.8  6.4 - 8.2 g/dL Final    Albumin 04/18/2024 3.9  3.5 - 5.0 g/dL  Final    Globulin 04/18/2024 3.9  2.0 - 4.0 g/dL Final    A/G Ratio 04/18/2024 1.0   Final    Bilirubin, Total 04/18/2024 0.3  >0.0 - 1.2 mg/dL Final    Alk Phos 04/18/2024 81  37 - 98 U/L Final    ALT 04/18/2024 24  13 - 56 U/L Final    AST 04/18/2024 6 (L)  15 - 37 U/L Final    eGFR 04/18/2024 64  >=60 mL/min/1.73m2 Final    Hemoglobin A1C 04/18/2024 5.9  4.5 - 6.6 % Final      Normal:               <5.7%  Pre-Diabetic:       5.7% to 6.4%  Diabetic:             >6.4%  Diabetic Goal:     <7%    Estimated Average Glucose 04/18/2024 123  mg/dL Final    Triglycerides 04/18/2024 248 (H)  35 - 150 mg/dL Final      Normal:  <150 mg/dL  Borderline High: 150-199 mg/dL  High:   200-499 mg/dL  Very High:  >=500    Cholesterol 04/18/2024 162  0 - 200 mg/dL Final      <200 mg/dL:  Desirable  200-240 mg/dL: Borderline High  >240 mg/dL:  High    HDL Cholesterol 04/18/2024 50  40 - 60 mg/dL Final      <40 mg/dL: Low HDL  40-60 mg/dL: Normal  >60 mg/dL: Desirable    Cholesterol/HDL Ratio (Risk Factor) 04/18/2024 3.2   Final    Non-HDL 04/18/2024 112  mg/dL Final    LDL Calculated 04/18/2024 62  mg/dL Final    Unable to calculate due to one of the following values:  Cholesterol <5  HDL Cholesterol <5  Triglycerides <10 or >400    LDL/HDL 04/18/2024 1.2   Final    Unable to calculate due to one of the following values:  Cholesterol <5  HDL Cholesterol <5  Triglycerides <10 or >400    VLDL 04/18/2024 50  mg/dL Final    Uric Acid 04/18/2024 6.6 (H)  2.6 - 6.0 mg/dL Final    TSH 04/18/2024 2.180  0.358 - 3.740 uIU/mL Final    WBC 04/18/2024 9.01  4.50 - 11.00 K/uL Final    RBC 04/18/2024 4.61  4.20 - 5.40 M/uL Final    Hemoglobin 04/18/2024 12.9  12.0 - 16.0 g/dL Final    Hematocrit 04/18/2024 41.1  38.0 - 47.0 % Final    MCV 04/18/2024 89.2  80.0 - 96.0 fL Final    MCH 04/18/2024 28.0  27.0 - 31.0 pg Final    MCHC 04/18/2024 31.4 (L)  32.0 - 36.0 g/dL Final    RDW 04/18/2024 14.9 (H)  11.5 - 14.5 % Final    Platelet Count 04/18/2024  348  150 - 400 K/uL Final    MPV 04/18/2024 10.6  9.4 - 12.4 fL Final    Neutrophils % 04/18/2024 56.9  53.0 - 65.0 % Final    Lymphocytes % 04/18/2024 36.3  27.0 - 41.0 % Final    Monocytes % 04/18/2024 4.9  2.0 - 6.0 % Final    Eosinophils % 04/18/2024 1.3  1.0 - 4.0 % Final    Basophils % 04/18/2024 0.4  0.0 - 1.0 % Final    Immature Granulocytes % 04/18/2024 0.2  0.0 - 0.4 % Final    nRBC, Auto 04/18/2024 0.0  <=0.0 % Final    Neutrophils, Abs 04/18/2024 5.12  1.80 - 7.70 K/uL Final    Lymphocytes, Absolute 04/18/2024 3.27  1.00 - 4.80 K/uL Final    Monocytes, Absolute 04/18/2024 0.44  0.00 - 0.80 K/uL Final    Eosinophils, Absolute 04/18/2024 0.12  0.00 - 0.50 K/uL Final    Basophils, Absolute 04/18/2024 0.04  0.00 - 0.20 K/uL Final    Immature Granulocytes, Absolute 04/18/2024 0.02  0.00 - 0.04 K/uL Final    nRBC, Absolute 04/18/2024 0.00  <=0.00 x10e3/uL Final    Diff Type 04/18/2024 Auto   Final             Assessment and Plan (including Health Maintenance)      Problem List  Smart Sets  Document Outside HM   :    Plan:     1. Hyperlipidemia, unspecified hyperlipidemia type  Assessment & Plan:  Continue atorvostatin      2. Hypertension, unspecified type  Assessment & Plan:  Well controlled continue losartan, spironolactone for fluid retention      3. Class 3 severe obesity due to excess calories with body mass index (BMI) of 45.0 to 49.9 in adult, unspecified whether serious comorbidity present  Assessment & Plan:  Healthy diet, exercise and weight loss recommended      4. Prediabetes  Assessment & Plan:  ADA diet       5. Gout, unspecified cause, unspecified chronicity, unspecified site  Assessment & Plan:  Colchicine daily      6. CORAL positive  Overview:  Joint pains, negative RF, rheumatology eval pending         RTC in 3 mo  There are no Patient Instructions on file for this visit.       Health Maintenance Due   Topic Date Due    COVID-19 Vaccine (1 - 2023-24 season) Never done       Most Recent  Immunizations   Administered Date(s) Administered    Hepatitis B, Adult 01/10/2023    Influenza 10/20/2022    Influenza - Quadrivalent - PF *Preferred* (6 months and older) 10/23/2023    MMR 10/23/2023    Tdap 10/25/2022    Varicella 01/10/2023            Health Maintenance Topics with due status: Not Due       Topic Last Completion Date    TETANUS VACCINE 10/25/2022    Influenza Vaccine 10/23/2023    Mammogram 11/10/2023    Hemoglobin A1c (Prediabetes) 04/18/2024       Future Appointments   Date Time Provider Department Center   10/23/2024  7:45 AM Sultana Neil FNP [facility]   11/15/2024  8:20 AM [facility]   4/22/2025  7:15 AM Sultana Neil FNP [facility]            Signature:  SHANNON [facility]     Date of encounter: 7/23/24

## 2024-10-09 DIAGNOSIS — I10 HYPERTENSION, UNSPECIFIED TYPE: ICD-10-CM

## 2024-10-11 RX ORDER — LOSARTAN POTASSIUM 50 MG/1
50 TABLET ORAL
Qty: 90 TABLET | Refills: 3 | Status: SHIPPED | OUTPATIENT
Start: 2024-10-11

## 2024-10-23 ENCOUNTER — OFFICE VISIT (OUTPATIENT)
Dept: FAMILY MEDICINE | Facility: CLINIC | Age: 44
End: 2024-10-23
Payer: COMMERCIAL

## 2024-10-23 VITALS
BODY MASS INDEX: 45.96 KG/M2 | WEIGHT: 286 LBS | SYSTOLIC BLOOD PRESSURE: 127 MMHG | HEART RATE: 89 BPM | TEMPERATURE: 98 F | HEIGHT: 66 IN | OXYGEN SATURATION: 95 % | DIASTOLIC BLOOD PRESSURE: 84 MMHG | RESPIRATION RATE: 20 BRPM

## 2024-10-23 DIAGNOSIS — E78.5 HYPERLIPIDEMIA, UNSPECIFIED HYPERLIPIDEMIA TYPE: ICD-10-CM

## 2024-10-23 DIAGNOSIS — E66.813 CLASS 3 SEVERE OBESITY DUE TO EXCESS CALORIES WITH BODY MASS INDEX (BMI) OF 45.0 TO 49.9 IN ADULT, UNSPECIFIED WHETHER SERIOUS COMORBIDITY PRESENT: Primary | ICD-10-CM

## 2024-10-23 DIAGNOSIS — M46.1 SACROILIITIS: ICD-10-CM

## 2024-10-23 DIAGNOSIS — M10.9 GOUT, UNSPECIFIED CAUSE, UNSPECIFIED CHRONICITY, UNSPECIFIED SITE: ICD-10-CM

## 2024-10-23 DIAGNOSIS — E66.01 CLASS 3 SEVERE OBESITY DUE TO EXCESS CALORIES WITH BODY MASS INDEX (BMI) OF 45.0 TO 49.9 IN ADULT, UNSPECIFIED WHETHER SERIOUS COMORBIDITY PRESENT: Primary | ICD-10-CM

## 2024-10-23 DIAGNOSIS — R73.03 PREDIABETES: ICD-10-CM

## 2024-10-23 DIAGNOSIS — I10 HYPERTENSION, UNSPECIFIED TYPE: ICD-10-CM

## 2024-10-23 RX ORDER — GABAPENTIN 100 MG/1
100 CAPSULE ORAL 2 TIMES DAILY
Qty: 60 CAPSULE | Refills: 2 | Status: SHIPPED | OUTPATIENT
Start: 2024-10-23

## 2024-10-23 NOTE — LETTER
October 23, 2024      [facility]  [address] [place] [address]  Phone: [phone]  Fax: [phone]       Patient: Humera Guerrero   YOB: 1980  Date of Visit: 10/23/2024    To Whom It May Concern:    SHAJI Guerrero  was at [facility] on 10/23/2024. The patient may return to work/school on 10/24/2024 with no restrictions. If you have any questions or concerns, or if I can be of further assistance, please do not hesitate to contact me.    Sincerely,    Zehra Tejeda MA

## 2024-10-23 NOTE — PROGRESS NOTES
"   SHANNON Callaway        PATIENT NAME: Humera Guerrero  : 1980  DATE: 10/23/24  MRN: [id number]      Patient PCP Information       Provider PCP Type    SHANNON Callaawy [facility]            Reason for Visit / Chief Complaint: Hypertension (Pt presents to the clinic for a 3m f/u) and Follow-up       Update PCP  Update Chief Complaint         History of Present Illness / Problem Focused Workflow     Humera Guerrero presents to the clinic with Hypertension (Pt presents to the clinic for a 3m f/u) and Follow-up     HPI  Patient presents to clinic for follow up. Recently saw Rheumatology due to hx of joint pain/back pain. CORAL prev positive. Reflex RF and labs negative. [facility] NP discussed plaquenil, pt decided to hold off for now work on weight loss. Patient states sacroilitis has reflared. Patient received injection at Rheum appt scheduled to go back 10/25 for repeat injection. Patient states she had "nerves burned" per pain tx approx year ago which worked well up until last week. Patient has also tried gabapentin in past. Will restart Neurontin and refer back to pain treatment for repeat eval.       Medical / Social / Family History     Past Medical History:   Diagnosis Date    Cerebral cyst     Other hyperlipidemia     Prediabetes     Vitamin D insufficiency        Past Surgical History:   Procedure Laterality Date     SECTION      HYSTERECTOMY Bilateral     INJECTION OF ANESTHETIC AGENT AROUND MEDIAL BRANCH NERVES INNERVATING LUMBAR FACET JOINT Bilateral 2023    Procedure: Bilateral L4-5,5-S1 MBB;  Surgeon: Carri Sanchez MD;  Location: [facility];  Service: Pain Management;  Laterality: Bilateral;    INJECTION OF ANESTHETIC AGENT AROUND MEDIAL BRANCH NERVES INNERVATING LUMBAR FACET JOINT Bilateral 2023    Procedure: Bilateral L4-5, 5-S1 medial branch block #2;  Surgeon: Carri Sanchez MD;  Location: [facility];  Service: Pain Management;  Laterality: " "Bilateral;    OOPHORECTOMY      RADIOFREQUENCY ABLATION OF LUMBAR MEDIAL BRANCH NERVE AT SINGLE LEVEL Bilateral 08/17/2023    Procedure: Bilateral L4-5,5-S1 MB RFTC;  Surgeon: Carri Sanchez MD;  Location: [facility];  Service: Pain Management;  Laterality: Bilateral;    TUBAL LIGATION         Social History  Ms.  reports that she has quit smoking. Her smoking use included cigars. She has been exposed to tobacco smoke. She has never used smokeless tobacco. She reports current alcohol use. She reports that she does not use drugs.    Family History  Ms.'s family history includes Breast cancer in her paternal grandmother; Cancer in her paternal grandmother; Hypertension in her maternal grandmother and mother; Other in her mother.    Medications and Allergies     Medications  Outpatient Medications Marked as Taking for the 10/23/24 encounter (Office Visit) with Sultana Neil FNP   Medication Sig Dispense Refill    allopurinoL (ZYLOPRIM) 300 MG tablet Take 1 tablet (300 mg total) by mouth once daily. 90 tablet 3    atorvastatin (LIPITOR) 20 MG tablet Take 1 tablet (20 mg total) by mouth once daily. 90 tablet 3    estradioL (ESTRACE) 1 MG tablet Take 1 tablet (1 mg total) by mouth once daily. 90 tablet 3    losartan (COZAAR) 50 MG tablet TAKE 1 TABLET BY MOUTH EVERY DAY 90 tablet 3    meloxicam (MOBIC) 15 MG tablet Take 1 tablet (15 mg total) by mouth once daily. 90 tablet 3    spironolactone (ALDACTONE) 25 MG tablet Take 1 tablet (25 mg total) by mouth once daily. 30 tablet 11       Allergies  Review of patient's allergies indicates:  No Known Allergies    Physical Examination     Vitals:    10/23/24 0756   BP: 127/84   BP Location: Right arm   Patient Position: Sitting   Pulse: 89   Resp: 20   Temp: 98.3 °F (36.8 °C)   TempSrc: Oral   SpO2: 95%   Weight: 129.7 kg (286 lb)   Height: 5' 6" (1.676 m)       Physical Exam  Vitals reviewed.   Constitutional:       Appearance: Normal appearance. She is obese. "   HENT:      Head: Normocephalic.      Right Ear: External ear normal.      Left Ear: External ear normal.      Nose: Nose normal.      Mouth/Throat:      Mouth: Mucous membranes are moist.   Eyes:      Extraocular Movements: Extraocular movements intact.   Cardiovascular:      Rate and Rhythm: Normal rate.      Pulses: Normal pulses.      Heart sounds: Normal heart sounds.   Pulmonary:      Effort: Pulmonary effort is normal. No respiratory distress.      Breath sounds: Normal breath sounds. No stridor. No wheezing, rhonchi or rales.   Chest:      Chest wall: No tenderness.   Musculoskeletal:         General: Normal range of motion.      Cervical back: Normal range of motion.      Comments: Back pain radiation to left Lateral hip   Skin:     General: Skin is warm and dry.   Neurological:      General: No focal deficit present.      Mental Status: She is alert.   Psychiatric:         Mood and Affect: Mood normal.         Behavior: Behavior normal.         Thought Content: Thought content normal.         Judgment: Judgment normal.           No visits with results within 14 Day(s) from this visit.   Latest known visit with results is:   Office Visit on 04/18/2024   Component Date Value Ref Range Status    Color, UA 04/18/2024 Yellow   Final    Spec Grav UA 04/18/2024 1.025   Final    pH, UA 04/18/2024 6.5   Final    WBC, UA 04/18/2024 Negative   Final    Nitrite, UA 04/18/2024 Negative   Final    Protein, POC 04/18/2024 Negative   Final    Glucose, UA 04/18/2024 Negative   Final    Ketones, UA 04/18/2024 Negative   Final    Bilirubin, POC 04/18/2024 Negative   Final    Urobilinogen, UA 04/18/2024 0.2   Final    Blood, UA 04/18/2024 Moderate   Final    Sodium 04/18/2024 140  136 - 145 mmol/L Final    Potassium 04/18/2024 4.2  3.5 - 5.1 mmol/L Final    Chloride 04/18/2024 104  98 - 107 mmol/L Final    CO2 04/18/2024 26  21 - 32 mmol/L Final    Anion Gap 04/18/2024 14  7 - 16 mmol/L Final    Glucose 04/18/2024 100  74 -  106 mg/dL Final    BUN 04/18/2024 12  7 - 18 mg/dL Final    Creatinine 04/18/2024 1.10 (H)  0.55 - 1.02 mg/dL Final    BUN/Creatinine Ratio 04/18/2024 11  6 - 20 Final    Calcium 04/18/2024 10.1  8.5 - 10.1 mg/dL Final    Total Protein 04/18/2024 7.8  6.4 - 8.2 g/dL Final    Albumin 04/18/2024 3.9  3.5 - 5.0 g/dL Final    Globulin 04/18/2024 3.9  2.0 - 4.0 g/dL Final    A/G Ratio 04/18/2024 1.0   Final    Bilirubin, Total 04/18/2024 0.3  >0.0 - 1.2 mg/dL Final    Alk Phos 04/18/2024 81  37 - 98 U/L Final    ALT 04/18/2024 24  13 - 56 U/L Final    AST 04/18/2024 6 (L)  15 - 37 U/L Final    eGFR 04/18/2024 64  >=60 mL/min/1.73m2 Final    Hemoglobin A1C 04/18/2024 5.9  4.5 - 6.6 % Final      Normal:               <5.7%  Pre-Diabetic:       5.7% to 6.4%  Diabetic:             >6.4%  Diabetic Goal:     <7%    Estimated Average Glucose 04/18/2024 123  mg/dL Final    Triglycerides 04/18/2024 248 (H)  35 - 150 mg/dL Final      Normal:  <150 mg/dL  Borderline High: 150-199 mg/dL  High:   200-499 mg/dL  Very High:  >=500    Cholesterol 04/18/2024 162  0 - 200 mg/dL Final      <200 mg/dL:  Desirable  200-240 mg/dL: Borderline High  >240 mg/dL:  High    HDL Cholesterol 04/18/2024 50  40 - 60 mg/dL Final      <40 mg/dL: Low HDL  40-60 mg/dL: Normal  >60 mg/dL: Desirable    Cholesterol/HDL Ratio (Risk Factor) 04/18/2024 3.2   Final    Non-HDL 04/18/2024 112  mg/dL Final    LDL Calculated 04/18/2024 62  mg/dL Final    Unable to calculate due to one of the following values:  Cholesterol <5  HDL Cholesterol <5  Triglycerides <10 or >400    LDL/HDL 04/18/2024 1.2   Final    Unable to calculate due to one of the following values:  Cholesterol <5  HDL Cholesterol <5  Triglycerides <10 or >400    VLDL 04/18/2024 50  mg/dL Final    Uric Acid 04/18/2024 6.6 (H)  2.6 - 6.0 mg/dL Final    TSH 04/18/2024 2.180  0.358 - 3.740 uIU/mL Final    WBC 04/18/2024 9.01  4.50 - 11.00 K/uL Final    RBC 04/18/2024 4.61  4.20 - 5.40 M/uL Final     Hemoglobin 04/18/2024 12.9  12.0 - 16.0 g/dL Final    Hematocrit 04/18/2024 41.1  38.0 - 47.0 % Final    MCV 04/18/2024 89.2  80.0 - 96.0 fL Final    MCH 04/18/2024 28.0  27.0 - 31.0 pg Final    MCHC 04/18/2024 31.4 (L)  32.0 - 36.0 g/dL Final    RDW 04/18/2024 14.9 (H)  11.5 - 14.5 % Final    Platelet Count 04/18/2024 348  150 - 400 K/uL Final    MPV 04/18/2024 10.6  9.4 - 12.4 fL Final    Neutrophils % 04/18/2024 56.9  53.0 - 65.0 % Final    Lymphocytes % 04/18/2024 36.3  27.0 - 41.0 % Final    Monocytes % 04/18/2024 4.9  2.0 - 6.0 % Final    Eosinophils % 04/18/2024 1.3  1.0 - 4.0 % Final    Basophils % 04/18/2024 0.4  0.0 - 1.0 % Final    Immature Granulocytes % 04/18/2024 0.2  0.0 - 0.4 % Final    nRBC, Auto 04/18/2024 0.0  <=0.0 % Final    Neutrophils, Abs 04/18/2024 5.12  1.80 - 7.70 K/uL Final    Lymphocytes, Absolute 04/18/2024 3.27  1.00 - 4.80 K/uL Final    Monocytes, Absolute 04/18/2024 0.44  0.00 - 0.80 K/uL Final    Eosinophils, Absolute 04/18/2024 0.12  0.00 - 0.50 K/uL Final    Basophils, Absolute 04/18/2024 0.04  0.00 - 0.20 K/uL Final    Immature Granulocytes, Absolute 04/18/2024 0.02  0.00 - 0.04 K/uL Final    nRBC, Absolute 04/18/2024 0.00  <=0.00 x10e3/uL Final    Diff Type 04/18/2024 Auto   Final             Assessment and Plan (including Health Maintenance)      Problem List  Smart Sets  Document Outside HM   :    Plan:     1. Class 3 severe obesity due to excess calories with body mass index (BMI) of 45.0 to 49.9 in adult, unspecified whether serious comorbidity present  Assessment & Plan:  Healthy diet, exercise and weight loss recommended      2. Hypertension, unspecified type  Assessment & Plan:  Well controlled continue losartan, spironolactone for fluid retention      3. Hyperlipidemia, unspecified hyperlipidemia type  Assessment & Plan:  Continue atorvostatin      4. Gout, unspecified cause, unspecified chronicity, unspecified site  Assessment & Plan:  Allopurinol daily      5.  Prediabetes  Assessment & Plan:  ADA diet       6. Sacroiliitis  -     gabapentin (NEURONTIN) 100 MG capsule; Take 1 capsule (100 mg total) by mouth 2 (two) times daily.  Dispense: 60 capsule; Refill: 2  -     Ambulatory referral/consult to Pain Clinic; Future; Expected date: 10/30/2024       reviewed  RTC in 3 mo  There are no Patient Instructions on file for this visit.       Health Maintenance Due   Topic Date Due    Influenza Vaccine (1) 09/01/2024    COVID-19 Vaccine (1 - 2024-25 season) Never done    Mammogram  11/10/2024       Most Recent Immunizations   Administered Date(s) Administered    Hepatitis B, Adult 01/10/2023    Influenza 10/20/2022    Influenza - Quadrivalent - PF *Preferred* (6 months and older) 10/23/2023    MMR 10/23/2023    Tdap 10/25/2022    Varicella 01/10/2023            Health Maintenance Topics with due status: Not Due       Topic Last Completion Date    TETANUS VACCINE 10/25/2022    Hemoglobin A1c (Prediabetes) 04/18/2024    RSV Vaccine (Age 60+ and Pregnant patients) Not Due       Future Appointments   Date Time Provider Department Center   11/15/2024  8:20 AM [facility]   12/2/2024  8:45 AM [facility]   1/23/2025  8:15 AM Sultana Neil FNP [facility]   4/22/2025  7:15 [facility]            Signature:  [facility]ANN[facility]     Date of encounter: 10/23/24

## 2024-11-15 ENCOUNTER — HOSPITAL ENCOUNTER (OUTPATIENT)
Dept: RADIOLOGY | Facility: HOSPITAL | Age: 44
Discharge: HOME | End: 2024-11-15
Attending: NURSE PRACTITIONER
Payer: COMMERCIAL

## 2024-11-15 VITALS — BODY MASS INDEX: 45 KG/M2 | HEIGHT: 66 IN | WEIGHT: 280 LBS

## 2024-11-15 DIAGNOSIS — Z12.31 OTHER SCREENING MAMMOGRAM: ICD-10-CM

## 2024-11-15 PROCEDURE — 77063 BREAST TOMOSYNTHESIS BI: CPT | Mod: 26,,, | Performed by: RADIOLOGY

## 2024-11-15 PROCEDURE — 77067 SCR MAMMO BI INCL CAD: CPT | Mod: TC

## 2024-11-15 PROCEDURE — 77067 SCR MAMMO BI INCL CAD: CPT | Mod: 26,,, | Performed by: RADIOLOGY

## 2024-12-02 ENCOUNTER — OFFICE VISIT (OUTPATIENT)
Dept: PAIN MEDICINE | Facility: CLINIC | Age: 44
End: 2024-12-02
Payer: COMMERCIAL

## 2024-12-02 VITALS
SYSTOLIC BLOOD PRESSURE: 164 MMHG | DIASTOLIC BLOOD PRESSURE: 87 MMHG | HEART RATE: 85 BPM | HEIGHT: 66 IN | BODY MASS INDEX: 46.93 KG/M2 | WEIGHT: 292 LBS | RESPIRATION RATE: 18 BRPM

## 2024-12-02 DIAGNOSIS — M47.817 LUMBOSACRAL SPONDYLOSIS WITHOUT MYELOPATHY: Primary | ICD-10-CM

## 2024-12-02 DIAGNOSIS — M46.1 SACROILIITIS: ICD-10-CM

## 2024-12-02 PROCEDURE — 3077F SYST BP >= 140 MM HG: CPT | Mod: ,,, | Performed by: PAIN MEDICINE

## 2024-12-02 PROCEDURE — 99999 PR PBB SHADOW E&M-EST. PATIENT-LVL V: CPT | Mod: PBBFAC,,, | Performed by: PAIN MEDICINE

## 2024-12-02 PROCEDURE — 99215 OFFICE O/P EST HI 40 MIN: CPT | Mod: PBBFAC | Performed by: PAIN MEDICINE

## 2024-12-02 PROCEDURE — 3044F HG A1C LEVEL LT 7.0%: CPT | Mod: ,,, | Performed by: PAIN MEDICINE

## 2024-12-02 PROCEDURE — 99214 OFFICE O/P EST MOD 30 MIN: CPT | Mod: S$PBB,,, | Performed by: PAIN MEDICINE

## 2024-12-02 PROCEDURE — 3008F BODY MASS INDEX DOCD: CPT | Mod: ,,, | Performed by: PAIN MEDICINE

## 2024-12-02 PROCEDURE — 3079F DIAST BP 80-89 MM HG: CPT | Mod: ,,, | Performed by: PAIN MEDICINE

## 2024-12-02 PROCEDURE — 4010F ACE/ARB THERAPY RXD/TAKEN: CPT | Mod: ,,, | Performed by: PAIN MEDICINE

## 2024-12-02 RX ORDER — ACETAMINOPHEN AND CODEINE PHOSPHATE 300; 30 MG/1; MG/1
1 TABLET ORAL 2 TIMES DAILY PRN
Qty: 30 TABLET | Refills: 0 | Status: SHIPPED | OUTPATIENT
Start: 2024-12-02 | End: 2024-12-17

## 2024-12-02 NOTE — PATIENT INSTRUCTIONS
Bilateral L4-S1 RFTC scheduled for 12/12/24 @ 11:00am      Procedure Instructions:    Nothing to eat or drink for 8 hours or after midnight including gum, candy, mints, or tobacco products.  If you are scheduled for 1:30 or later nothing to eat or drink after 5 a.m. the morning of the procedure, including gum, candy, mints, or tobacco products.  Must have a  at least 18 yrs of age to stay present at all times  No Diabetic medications the morning of procedure, check blood sugar the morning of procedure, if it is greater than 200 call the office at [phone]  If you are started on antibiotics or have been prescribed antibiotics, have a fever, or have any other type of infection call to reschedule procedure.  If you take blood pressure medications you can take it at your regular scheduled time with a small sip of WATER!  Dentures are to be removed prior to procedure or we can provide you with a denture cup to remove them prior to being taken back for procedure.   False eyelashes are to be removed before the morning of procedure.    Contacts will have to be removed prior to procedure.  No jewelry is to be worn to the procedure.     HOLD ASPIRIN AND ASPIRIN PRODUCTS  (ASPIRIN, BC POWDER ETC. ) FOR 7 DAYS  PRIOR TO PROCEDURE  HOLD NSAIDS( ibuprofen, mobic, meloxicam, advil, diclofenac, naproxen, relafen, celebrex,  methotrexate, aleve etc....)  FOR 3 DAYS   PRIOR TO PROCEDURE      Begin holding Mobic on 12/9/24

## 2024-12-02 NOTE — PROGRESS NOTES
She Disclaimer: This note has been generated using voice-recognition software. There may be typographical errors that have been missed during proof-reading        Patient ID: Humera Guerrero is a 44 y.o. female.      Chief Complaint: Low-back Pain, Hip Pain (Left hip/groin), and Leg Pain (left)      44-year-old female returns for re-evaluation of recurrent lower back, left hip and groin pain.  She received a lumbar medial branch RFTC August 17, 2023 and experienced greater than 90% pain relief until the summer 2024.  She was evaluated by her primary care physician and referred to rheumatology.  Her  lower back and groin pain is sharp and affects her ability to walk.  MRI of the lumbar spine revealed multilevel degenerative changes, disc bulges, facet hypertrophy and ligamentum flavum hypertrophy from L3-S1.  She returns today to discuss repeating the lumbar medial branch radiofrequency procedure.  Lumbar epidural steroid injection was also discussed for radicular symptoms.                    Pain Assessment  Pain Assessment: 0-10  Pain Score:   9  Pain Location: Back (LBP left hip/groin and LLE pain)  Pain Descriptors: Constant, Aching, Burning, Dull, Sharp, Radiating  Aggravating Factors: Bending, Stretching  Pain Intervention(s): Rest, Other (Comment) (no weight baring)      A's of Opioid Risk Assessment  Activity:Patient has difficulty performing ADL.   Analgesia:  None  Adverse Effects: Patient denies constipation or sedation.  Aberrant Behavior:  reviewed with no aberrant drug seeking/taking behavior.      Patient denies any suicidal or homicidal ideations    Physical Therapy/Home Exercise: yes          Review of Systems   Constitutional: Negative.    HENT: Negative.     Eyes: Negative.    Respiratory: Negative.     Cardiovascular: Negative.    Gastrointestinal: Negative.    Endocrine: Negative.    Genitourinary: Negative.    Musculoskeletal:  Positive for back pain and gait problem.   Integumentary:   Negative.   Hematological: Negative.    Psychiatric/Behavioral:  Positive for sleep disturbance.              Past Medical History:   Diagnosis Date    Cerebral cyst     Other hyperlipidemia     Prediabetes     Vitamin D insufficiency      Past Surgical History:   Procedure Laterality Date     SECTION      HYSTERECTOMY Bilateral     INJECTION OF ANESTHETIC AGENT AROUND MEDIAL BRANCH NERVES INNERVATING LUMBAR FACET JOINT Bilateral 2023    Procedure: Bilateral L4-5,5-S1 MBB;  Surgeon: Carri Sanchez MD;  Location: [facility];  Service: Pain Management;  Laterality: Bilateral;    INJECTION OF ANESTHETIC AGENT AROUND MEDIAL BRANCH NERVES INNERVATING LUMBAR FACET JOINT Bilateral 2023    Procedure: Bilateral L4-5, 5-S1 medial branch block #2;  Surgeon: Carri Sanchez MD;  Location: [facility];  Service: Pain Management;  Laterality: Bilateral;    OOPHORECTOMY      RADIOFREQUENCY ABLATION OF LUMBAR MEDIAL BRANCH NERVE AT SINGLE LEVEL Bilateral 2023    Procedure: Bilateral L4-5,5-S1 MB RFTC;  Surgeon: Carri Sanchez MD;  Location: [facility];  Service: Pain Management;  Laterality: Bilateral;    TUBAL LIGATION       Social History     Socioeconomic History    Marital status:     Number of children: 3    Highest education level: Master's degree (e.g., MA, MS, Maurice, MEd, MSW, LILIANA)   Occupational History     Comment:  and therapist   Tobacco Use    Smoking status: Former     Types: Cigars     Passive exposure: Past    Smokeless tobacco: Never   Substance and Sexual Activity    Alcohol use: Yes     Comment: 1 mixed drink ocassionally    Drug use: Never    Sexual activity: Yes   Social History Narrative    Lives with  2 daughters and 1 adult son    No Pets.    No smoking in the home.     Social Drivers of Health     Financial Resource Strain: Low Risk  (2024)    Overall Financial Resource Strain (CARDIA)     Difficulty of Paying Living  Expenses: Not hard at all   Food Insecurity: No Food Insecurity (4/16/2024)    Hunger Vital Sign     Worried About Running Out of Food in the Last Year: Never true     Ran Out of Food in the Last Year: Never true   Transportation Needs: No Transportation Needs (4/16/2024)    PRAPARE - Transportation     Lack of Transportation (Medical): No     Lack of Transportation (Non-Medical): No   Physical Activity: Insufficiently Active (4/16/2024)    Exercise Vital Sign     Days of Exercise per Week: 1 day     Minutes of Exercise per Session: 10 min   Stress: No Stress Concern Present (4/16/2024)    [employer] [place] [employer] - Occupational Stress Questionnaire     Feeling of Stress : Only a little   Housing Stability: Unknown (4/16/2024)    Housing Stability Vital Sign     Unable to Pay for Housing in the Last Year: No     Family History   Problem Relation Name Age of Onset    Other Mother      Hypertension Mother      Hypertension Maternal Grandmother      Breast cancer Paternal Grandmother      Cancer Paternal Grandmother       Review of patient's allergies indicates:  No Known Allergies  has a current medication list which includes the following prescription(s): allopurinol, atorvastatin, estradiol, losartan, meloxicam, spironolactone, acetaminophen-codeine 300-30mg, and gabapentin.      Objective:  Vitals:    12/02/24 0920   BP: (!) 164/87   Pulse: 85   Resp: 18        Physical Exam  Vitals and nursing note reviewed.   Constitutional:       General: She is not in acute distress.     Appearance: Normal appearance. She is obese. She is not ill-appearing, toxic-appearing or diaphoretic.   HENT:      Head: Normocephalic and atraumatic.      Nose: Nose normal.      Mouth/Throat:      Mouth: Mucous membranes are moist.   Eyes:      Extraocular Movements: Extraocular movements intact.      Pupils: Pupils are equal, round, and reactive to light.   Cardiovascular:      Rate and Rhythm: Normal rate and regular  rhythm.      Heart sounds: Normal heart sounds.   Pulmonary:      Effort: Pulmonary effort is normal. No respiratory distress.      Breath sounds: Normal breath sounds. No stridor. No wheezing or rhonchi.   Abdominal:      General: Bowel sounds are normal.      Palpations: Abdomen is soft.   Musculoskeletal:         General: No swelling or deformity.      Cervical back: Normal and normal range of motion. No spasms or tenderness. No pain with movement. Normal range of motion.      Thoracic back: Normal.      Lumbar back: Tenderness and bony tenderness present. No spasms. Decreased range of motion. Negative right straight leg raise test and negative left straight leg raise test. No scoliosis.      Right lower leg: No edema.      Left lower leg: No edema.      Comments: Lumbar pain with flexion, extension lateral rotation.  Lumbar spinous and paraspinous process tenderness to palpation.  Facet tenderness to  palpation from L4-S1.   Skin:     General: Skin is warm.   Neurological:      General: No focal deficit present.      Mental Status: She is alert and oriented to person, place, and time. Mental status is at baseline.      Cranial Nerves: No cranial nerve deficit.      Sensory: Sensation is intact. No sensory deficit.      Motor: No weakness.      Coordination: Coordination normal.      Gait: Gait abnormal.      Deep Tendon Reflexes: Reflexes are normal and symmetric.      Reflex Scores:       Patellar reflexes are 2+ on the right side and 2+ on the left side.       Achilles reflexes are 2+ on the right side and 2+ on the left side.  Psychiatric:         Mood and Affect: Mood normal.         Behavior: Behavior normal.           Assessment:      1. Lumbosacral spondylosis without myelopathy    2. Sacroiliitis          Plan:  1. reviewed  2. Tylenol No. 3 quantity of 30 b.i.d. for acute pain  3.Indication: The patient has clinical and radiologic findings suggestive of recurrent facet mediated pain at the same  anatomic site.  The patient is s/p bilateral lumbar L4/5 and L5/S1 RFA with over 90% consistent improvement of their pain lasting at least 6 months since the prior non-pulsed waved high heat RF treatment.      3.Indications for this procedure for this specific patient include the following   - Pt has had symptoms for three months with moderate to severe pain with functional impairment rated of 7/10 pain.   - Pain non-responsive to conservative care.    - Pain predominately axial and not associated with radiculopathy or claudication.    - No non-facet pathology as source of pain.    - Clinical assessment implicates facet joint as putative pain source.    - Pain is exacerbated by extension or prolonged sitting/standing and relieved by rest.    - No unexplained neurologic deficit.    - No history of coagulopathy, infection or unstable medical conditions.    - Pain is causing significant functional limitation resulting in diminished quality of life and impaired age appropriate ADL's.   - Clinical assessment implicates facet joint as putative source of pain  - Repeat injections not done prior to 7 days   - no more than 2 levels will be done per side      4.Monitored Anesthesia Care medical necessity authorization request:    Monitor anesthesia request is medically indicated for the scheduled nerve block procedure due to:  - needle phobia and anxiety, placing  the patient at risk during the provided service.  -patient has a BMI greater than 40  -patient has an ASA class greater than 3 and requires constant presence of an anesthesiologist during the procedure:  -patient has severe problems with muscles and muscle spasticity that makes it hard to lie still  -patient suffers from chronic pain and is unable to function due to  diminished ADLs    5.The planned medically necessary  surgical procedure is performed in a hospital outpatient department and not in an ambulatory surgical center due to:     -there is no geographically  assessable ambulatory surgery center that has the  necessary equipment and fluoroscopy needed for the procedure     -there is no geographically assessable ambulatory surgical center available at which the physician has privileges     -an ASC's  specific  guideline regarding the individuals weight or health conditions that prevent the use of an ASC         report:  Reviewed and consistent with medication use as prescribed.      The total time spent for evaluation and management on 12/02/2024 including reviewing separately obtained history, performing a medically appropriate exam and evaluation, documenting clinical information in the health record, independently interpreting results and communicating them to the patient/family/caregiver, and ordering medications/tests/procedures was between 15-29 minutes.    The above plan and management options were discussed at length with patient. Patient is in agreement with the above and verbalized understanding. It will be communicated with the referring physician via electronic record, fax, or mail.

## 2024-12-02 NOTE — H&P (VIEW-ONLY)
She Disclaimer: This note has been generated using voice-recognition software. There may be typographical errors that have been missed during proof-reading        Patient ID: Humera Guerrero is a 44 y.o. female.      Chief Complaint: Low-back Pain, Hip Pain (Left hip/groin), and Leg Pain (left)      44-year-old female returns for re-evaluation of recurrent lower back, left hip and groin pain.  She received a lumbar medial branch RFTC August 17, 2023 and experienced greater than 90% pain relief until the summer 2024.  She was evaluated by her primary care physician and referred to rheumatology.  Her  lower back and groin pain is sharp and affects her ability to walk.  MRI of the lumbar spine revealed multilevel degenerative changes, disc bulges, facet hypertrophy and ligamentum flavum hypertrophy from L3-S1.  She returns today to discuss repeating the lumbar medial branch radiofrequency procedure.  Lumbar epidural steroid injection was also discussed for radicular symptoms.                    Pain Assessment  Pain Assessment: 0-10  Pain Score:   9  Pain Location: Back (LBP left hip/groin and LLE pain)  Pain Descriptors: Constant, Aching, Burning, Dull, Sharp, Radiating  Aggravating Factors: Bending, Stretching  Pain Intervention(s): Rest, Other (Comment) (no weight baring)      A's of Opioid Risk Assessment  Activity:Patient has difficulty performing ADL.   Analgesia:  None  Adverse Effects: Patient denies constipation or sedation.  Aberrant Behavior:  reviewed with no aberrant drug seeking/taking behavior.      Patient denies any suicidal or homicidal ideations    Physical Therapy/Home Exercise: yes          Review of Systems   Constitutional: Negative.    HENT: Negative.     Eyes: Negative.    Respiratory: Negative.     Cardiovascular: Negative.    Gastrointestinal: Negative.    Endocrine: Negative.    Genitourinary: Negative.    Musculoskeletal:  Positive for back pain and gait problem.   Integumentary:   Negative.   Hematological: Negative.    Psychiatric/Behavioral:  Positive for sleep disturbance.              Past Medical History:   Diagnosis Date    Cerebral cyst     Other hyperlipidemia     Prediabetes     Vitamin D insufficiency      Past Surgical History:   Procedure Laterality Date     SECTION      HYSTERECTOMY Bilateral     INJECTION OF ANESTHETIC AGENT AROUND MEDIAL BRANCH NERVES INNERVATING LUMBAR FACET JOINT Bilateral 2023    Procedure: Bilateral L4-5,5-S1 MBB;  Surgeon: Carri Sanchez MD;  Location: [facility];  Service: Pain Management;  Laterality: Bilateral;    INJECTION OF ANESTHETIC AGENT AROUND MEDIAL BRANCH NERVES INNERVATING LUMBAR FACET JOINT Bilateral 2023    Procedure: Bilateral L4-5, 5-S1 medial branch block #2;  Surgeon: Carri Sanchez MD;  Location: [facility];  Service: Pain Management;  Laterality: Bilateral;    OOPHORECTOMY      RADIOFREQUENCY ABLATION OF LUMBAR MEDIAL BRANCH NERVE AT SINGLE LEVEL Bilateral 2023    Procedure: Bilateral L4-5,5-S1 MB RFTC;  Surgeon: Carri Sanchez MD;  Location: [facility];  Service: Pain Management;  Laterality: Bilateral;    TUBAL LIGATION       Social History     Socioeconomic History    Marital status:     Number of children: 3    Highest education level: Master's degree (e.g., MA, MS, Maurice, MEd, MSW, LILIANA)   Occupational History     Comment:  and therapist   Tobacco Use    Smoking status: Former     Types: Cigars     Passive exposure: Past    Smokeless tobacco: Never   Substance and Sexual Activity    Alcohol use: Yes     Comment: 1 mixed drink ocassionally    Drug use: Never    Sexual activity: Yes   Social History Narrative    Lives with  2 daughters and 1 adult son    No Pets.    No smoking in the home.     Social Drivers of Health     Financial Resource Strain: Low Risk  (2024)    Overall Financial Resource Strain (CARDIA)     Difficulty of Paying Living  Expenses: Not hard at all   Food Insecurity: No Food Insecurity (4/16/2024)    Hunger Vital Sign     Worried About Running Out of Food in the Last Year: Never true     Ran Out of Food in the Last Year: Never true   Transportation Needs: No Transportation Needs (4/16/2024)    PRAPARE - Transportation     Lack of Transportation (Medical): No     Lack of Transportation (Non-Medical): No   Physical Activity: Insufficiently Active (4/16/2024)    Exercise Vital Sign     Days of Exercise per Week: 1 day     Minutes of Exercise per Session: 10 min   Stress: No Stress Concern Present (4/16/2024)    [employer] [place] [employer] - Occupational Stress Questionnaire     Feeling of Stress : Only a little   Housing Stability: Unknown (4/16/2024)    Housing Stability Vital Sign     Unable to Pay for Housing in the Last Year: No     Family History   Problem Relation Name Age of Onset    Other Mother      Hypertension Mother      Hypertension Maternal Grandmother      Breast cancer Paternal Grandmother      Cancer Paternal Grandmother       Review of patient's allergies indicates:  No Known Allergies  has a current medication list which includes the following prescription(s): allopurinol, atorvastatin, estradiol, losartan, meloxicam, spironolactone, acetaminophen-codeine 300-30mg, and gabapentin.      Objective:  Vitals:    12/02/24 0920   BP: (!) 164/87   Pulse: 85   Resp: 18        Physical Exam  Vitals and nursing note reviewed.   Constitutional:       General: She is not in acute distress.     Appearance: Normal appearance. She is obese. She is not ill-appearing, toxic-appearing or diaphoretic.   HENT:      Head: Normocephalic and atraumatic.      Nose: Nose normal.      Mouth/Throat:      Mouth: Mucous membranes are moist.   Eyes:      Extraocular Movements: Extraocular movements intact.      Pupils: Pupils are equal, round, and reactive to light.   Cardiovascular:      Rate and Rhythm: Normal rate and regular  rhythm.      Heart sounds: Normal heart sounds.   Pulmonary:      Effort: Pulmonary effort is normal. No respiratory distress.      Breath sounds: Normal breath sounds. No stridor. No wheezing or rhonchi.   Abdominal:      General: Bowel sounds are normal.      Palpations: Abdomen is soft.   Musculoskeletal:         General: No swelling or deformity.      Cervical back: Normal and normal range of motion. No spasms or tenderness. No pain with movement. Normal range of motion.      Thoracic back: Normal.      Lumbar back: Tenderness and bony tenderness present. No spasms. Decreased range of motion. Negative right straight leg raise test and negative left straight leg raise test. No scoliosis.      Right lower leg: No edema.      Left lower leg: No edema.      Comments: Lumbar pain with flexion, extension lateral rotation.  Lumbar spinous and paraspinous process tenderness to palpation.  Facet tenderness to  palpation from L4-S1.   Skin:     General: Skin is warm.   Neurological:      General: No focal deficit present.      Mental Status: She is alert and oriented to person, place, and time. Mental status is at baseline.      Cranial Nerves: No cranial nerve deficit.      Sensory: Sensation is intact. No sensory deficit.      Motor: No weakness.      Coordination: Coordination normal.      Gait: Gait abnormal.      Deep Tendon Reflexes: Reflexes are normal and symmetric.      Reflex Scores:       Patellar reflexes are 2+ on the right side and 2+ on the left side.       Achilles reflexes are 2+ on the right side and 2+ on the left side.  Psychiatric:         Mood and Affect: Mood normal.         Behavior: Behavior normal.           Assessment:      1. Lumbosacral spondylosis without myelopathy    2. Sacroiliitis          Plan:  1. reviewed  2. Tylenol No. 3 quantity of 30 b.i.d. for acute pain  3.Indication: The patient has clinical and radiologic findings suggestive of recurrent facet mediated pain at the same  anatomic site.  The patient is s/p bilateral lumbar L4/5 and L5/S1 RFA with over 90% consistent improvement of their pain lasting at least 6 months since the prior non-pulsed waved high heat RF treatment.      3.Indications for this procedure for this specific patient include the following   - Pt has had symptoms for three months with moderate to severe pain with functional impairment rated of 7/10 pain.   - Pain non-responsive to conservative care.    - Pain predominately axial and not associated with radiculopathy or claudication.    - No non-facet pathology as source of pain.    - Clinical assessment implicates facet joint as putative pain source.    - Pain is exacerbated by extension or prolonged sitting/standing and relieved by rest.    - No unexplained neurologic deficit.    - No history of coagulopathy, infection or unstable medical conditions.    - Pain is causing significant functional limitation resulting in diminished quality of life and impaired age appropriate ADL's.   - Clinical assessment implicates facet joint as putative source of pain  - Repeat injections not done prior to 7 days   - no more than 2 levels will be done per side      4.Monitored Anesthesia Care medical necessity authorization request:    Monitor anesthesia request is medically indicated for the scheduled nerve block procedure due to:  - needle phobia and anxiety, placing  the patient at risk during the provided service.  -patient has a BMI greater than 40  -patient has an ASA class greater than 3 and requires constant presence of an anesthesiologist during the procedure:  -patient has severe problems with muscles and muscle spasticity that makes it hard to lie still  -patient suffers from chronic pain and is unable to function due to  diminished ADLs    5.The planned medically necessary  surgical procedure is performed in a hospital outpatient department and not in an ambulatory surgical center due to:     -there is no geographically  assessable ambulatory surgery center that has the  necessary equipment and fluoroscopy needed for the procedure     -there is no geographically assessable ambulatory surgical center available at which the physician has privileges     -an ASC's  specific  guideline regarding the individuals weight or health conditions that prevent the use of an ASC         report:  Reviewed and consistent with medication use as prescribed.      The total time spent for evaluation and management on 12/02/2024 including reviewing separately obtained history, performing a medically appropriate exam and evaluation, documenting clinical information in the health record, independently interpreting results and communicating them to the patient/family/caregiver, and ordering medications/tests/procedures was between 15-29 minutes.    The above plan and management options were discussed at length with patient. Patient is in agreement with the above and verbalized understanding. It will be communicated with the referring physician via electronic record, fax, or mail.

## 2024-12-12 ENCOUNTER — ANESTHESIA (OUTPATIENT)
Dept: PAIN MEDICINE | Facility: HOSPITAL | Age: 44
End: 2024-12-12
Payer: COMMERCIAL

## 2024-12-12 ENCOUNTER — HOSPITAL ENCOUNTER (OUTPATIENT)
Facility: HOSPITAL | Age: 44
Discharge: HOME | End: 2024-12-12
Attending: PAIN MEDICINE | Admitting: PAIN MEDICINE
Payer: COMMERCIAL

## 2024-12-12 ENCOUNTER — ANESTHESIA EVENT (OUTPATIENT)
Dept: PAIN MEDICINE | Facility: HOSPITAL | Age: 44
End: 2024-12-12
Payer: COMMERCIAL

## 2024-12-12 VITALS
TEMPERATURE: 98 F | BODY MASS INDEX: 45.51 KG/M2 | DIASTOLIC BLOOD PRESSURE: 74 MMHG | RESPIRATION RATE: 11 BRPM | WEIGHT: 283.19 LBS | HEART RATE: 73 BPM | HEIGHT: 66 IN | SYSTOLIC BLOOD PRESSURE: 118 MMHG | OXYGEN SATURATION: 100 %

## 2024-12-12 DIAGNOSIS — M47.817 SPONDYLOSIS OF LUMBOSACRAL REGION WITHOUT MYELOPATHY OR RADICULOPATHY: ICD-10-CM

## 2024-12-12 PROCEDURE — 37000008 HC ANESTHESIA 1ST 15 MINUTES: Performed by: PAIN MEDICINE

## 2024-12-12 PROCEDURE — 63600175 PHARM REV CODE 636 W HCPCS: Performed by: PAIN MEDICINE

## 2024-12-12 PROCEDURE — 37000009 HC ANESTHESIA EA ADD 15 MINS: Performed by: PAIN MEDICINE

## 2024-12-12 PROCEDURE — 27201423 OPTIME MED/SURG SUP & DEVICES STERILE SUPPLY: Performed by: PAIN MEDICINE

## 2024-12-12 PROCEDURE — 64636 DESTROY L/S FACET JNT ADDL: CPT | Mod: 50 | Performed by: PAIN MEDICINE

## 2024-12-12 PROCEDURE — 63600175 PHARM REV CODE 636 W HCPCS: Performed by: NURSE ANESTHETIST, CERTIFIED REGISTERED

## 2024-12-12 PROCEDURE — 27000284 HC CANNULA NASAL: Performed by: NURSE ANESTHETIST, CERTIFIED REGISTERED

## 2024-12-12 PROCEDURE — 64636 DESTROY L/S FACET JNT ADDL: CPT | Mod: 50,,, | Performed by: PAIN MEDICINE

## 2024-12-12 PROCEDURE — 64635 DESTROY LUMB/SAC FACET JNT: CPT | Mod: 50 | Performed by: PAIN MEDICINE

## 2024-12-12 PROCEDURE — 64635 DESTROY LUMB/SAC FACET JNT: CPT | Mod: 50,,, | Performed by: PAIN MEDICINE

## 2024-12-12 RX ORDER — TRIAMCINOLONE ACETONIDE 40 MG/ML
INJECTION, SUSPENSION INTRA-ARTICULAR; INTRAMUSCULAR CODE/TRAUMA/SEDATION MEDICATION
Status: DISCONTINUED | OUTPATIENT
Start: 2024-12-12 | End: 2024-12-12 | Stop reason: HOSPADM

## 2024-12-12 RX ORDER — SODIUM CHLORIDE 9 MG/ML
INJECTION, SOLUTION INTRAVENOUS CONTINUOUS
Status: DISCONTINUED | OUTPATIENT
Start: 2024-12-12 | End: 2024-12-12 | Stop reason: HOSPADM

## 2024-12-12 RX ORDER — PROPOFOL 10 MG/ML
INJECTION, EMULSION INTRAVENOUS
Status: DISCONTINUED | OUTPATIENT
Start: 2024-12-12 | End: 2024-12-12

## 2024-12-12 RX ORDER — LIDOCAINE HYDROCHLORIDE 20 MG/ML
INJECTION, SOLUTION EPIDURAL; INFILTRATION; INTRACAUDAL; PERINEURAL
Status: DISCONTINUED | OUTPATIENT
Start: 2024-12-12 | End: 2024-12-12

## 2024-12-12 RX ORDER — BUPIVACAINE HYDROCHLORIDE 2.5 MG/ML
INJECTION, SOLUTION INFILTRATION; PERINEURAL CODE/TRAUMA/SEDATION MEDICATION
Status: DISCONTINUED | OUTPATIENT
Start: 2024-12-12 | End: 2024-12-12 | Stop reason: HOSPADM

## 2024-12-12 RX ADMIN — PROPOFOL 50 MG: 10 INJECTION, EMULSION INTRAVENOUS at 12:12

## 2024-12-12 RX ADMIN — PROPOFOL 100 MG: 10 INJECTION, EMULSION INTRAVENOUS at 12:12

## 2024-12-12 RX ADMIN — LIDOCAINE HYDROCHLORIDE 50 MG: 20 INJECTION, SOLUTION EPIDURAL; INFILTRATION; INTRACAUDAL; PERINEURAL at 12:12

## 2024-12-12 NOTE — OP NOTE
Procedure Note    Procedure Date: 12/12/2024    Procedure Performed:  Radiofrequency ablation of the bilateral  L4-5,L5-S1 medial branch nerves utilizing fluoroscopy    Indications: Patient has failed conservative therapy.      Pre-op diagnosis: Lumbosacral Spondylosis    Post-op diagnosis: same    Physician: WIN Sanchez MD    Anesthesia:MAC    Medications injected:  Pre-lesion, 2ml of 1% lidocaine at each level.  From a mixture of  0.25% bupivacaine and  40mg of kenalog 1ml of this solution was injected at each level post-lesion.    Local anesthetic used: 1% Lidocaine, 10 ml     Estimated Blood Loss:Less than 1cc    IVF:Per Anesthesia    Complications: None    Technique:  The patient was interviewed in the holding area and Risks/Benefits were discussed, including, but not limited to  the possibility of new or different pain, bleeding or infection.   All questions were answered.  The patient understood and accepted risks.  The area of treatment was marked. Consent was reviewed and signed.  A time out was taken to identify the patient, procedure and side of the procedure. The patient was placed in a prone position, then prepped and draped in the usual sterile fashion using ChloraPrep and sterile towels.  The levels were determined under fluoroscopic guidance.   AP and oblique fluoroscopy were used to identify and jessee the junctions between the superior articular processes and transverse processes at  bilateral  L L4-5, L5-S1.  The bilateral sacral ala was also identified and marked.  The skin and subcutaneous tissues in these identified areas were anesthetized with 1% lidocaine. A 20-gauge 150 mm [employer] RF needle was advanced towards each of these points under fluoroscopic guidance such that the tip of the needle was positioned posterior to the Neuro-foramen on lateral fluoroscopic view. Each needle was positioned such that, on stimulation, the patient had an appropriate pain response between 0.3-0.5 V, with no motor  response, other than Lumbar Paraspinal Muscles up to 2.0V.  One mL of 2% lidocaine was then injected at each level prior to lesioning, which was performed for 90 seconds at 80°C.  Once the lesion was complete, 1 mL of a solution from a  mixture of 0.25% bupivacaine 3 cc and 40mg kenalog  was injected through each probe. The probes were removed and a sterile Band-Aid dressing was applied to the puncture site.  The patient tolerated the procedure well and was monitored after the procedure.  Patient was given post procedure and discharge instructions to follow at home.  The patient was discharged in a stable condition and accompanied by an adult.

## 2024-12-12 NOTE — PLAN OF CARE
Plan:1325  D/c pt via wheelchair at   Informed pt if does not void in 8 hours to go to ER. Notify if redness, drainage, from injection site or fever over next 3-4 days. Rest and drink plenty of fluids for the remainder of the day. No lifting over 5 lbs. For the remainder of the day. Continue regular medications as prescribed. May take pain medications as prescribed.     Pain is about 10 after the procedure.

## 2024-12-12 NOTE — TRANSFER OF CARE
"Anesthesia Transfer of Care Note    Patient: Humera Guerrero    Procedure(s) Performed: Procedure(s) (LRB):  Bilateral L4-5, L5-S1 MB RFTC under fluoroscopy (Bilateral)    Patient location: PACU    Anesthesia Type: MAC    Transport from OR: Transported from OR on room air with adequate spontaneous ventilation    Post pain: adequate analgesia    Post assessment: no apparent anesthetic complications    Post vital signs: stable    Level of consciousness: sedated    Nausea/Vomiting: no nausea/vomiting    Complications: none    Transfer of care protocol was followed      Last vitals: Visit Vitals  /74   Pulse 89   Temp 36.7 °C (98 °F) (Skin)   Resp 16   Ht 5' 6" (1.676 m)   Wt 128.5 kg (283 lb 3.2 oz)   LMP  (LMP Unknown)   SpO2 100%   BMI 45.71 kg/m²     "

## 2024-12-12 NOTE — BRIEF OP NOTE
The  Discharge Note  Short Stay    Admit Date: 12/12/2024    Discharge Date: 12/12/2024    Attending Physician: Carri Sanchez     Discharge Provider: Carri Sanchez    Diagnosis:  Lumbosacral spondylosis without myelopathy    Procedure performed:  Bilateral L4-5, L5-S1 medial branch RFTC under fluoroscopy    Findings: Procedure tolerated well and without complications. Consistent with diagnosis.    EBL: 0cc    Specimens: None    Discharged Condition: Good    Final Diagnoses: Lumbosacral spondylosis without myelopathy [M47.817]    Disposition: Home or Self Care    Hospital Course: No complications, uneventful    Outcome of Hospitalization, Treatment, Procedure, or Surgery:  Patient was admitted for outpatient interventional pain management procedure. The patient tolerated the procedure well with no complications.    Follow up/Patient Instructions:  Follow up as scheduled in Pain Management office in 3-4 weeks.  Patient has received instructions and follow up date and time.    Medications:  Continue previous medications

## 2024-12-12 NOTE — DISCHARGE SUMMARY
[facility] - Pain Management  Discharge Note  Short Stay    Procedure(s) (LRB):  Bilateral L4-5, L5-S1 MB RFTC under fluoroscopy (Bilateral)      OUTCOME: Patient tolerated treatment/procedure well without complication and is now ready for discharge.    DISPOSITION: Home or Self Care    FINAL DIAGNOSIS:  Lumbosacral spondylosis without myelopathy    FOLLOWUP: In clinic    DISCHARGE INSTRUCTIONS:  See nurse's notes     TIME SPENT ON DISCHARGE: 5 minutes

## 2024-12-12 NOTE — ANESTHESIA PREPROCEDURE EVALUATION
12/12/2024  Humera Guerrero is a 44 y.o., female.      Pre-op Assessment    I have reviewed the Patient Summary Reports.    I have reviewed the NPO Status.   I have reviewed the Medications.     Review of Systems  Anesthesia Hx:  No problems with previous Anesthesia             Family Hx of Anesthesia complications:   Personal Hx of Anesthesia complications                    Social:  Non-Smoker       Hematology/Oncology:  Hematology Normal   Oncology Normal                                   EENT/Dental:  EENT/Dental Normal           Cardiovascular:     Hypertension           hyperlipidemia                               Pulmonary:  Pulmonary Normal                       Renal/:  Renal/ Normal                 Hepatic/GI:  Hepatic/GI Normal                    Musculoskeletal:  Arthritis          Spine Disorders: lumbar Disc disease, Degenerative disease and Chronic Pain           Neurological:    Neuromuscular Disease,  Headaches           Chronic Pain Syndrome                         Endocrine:        Morbid Obesity / BMI > 40  Dermatological:  Skin Normal    Psych:  Psychiatric History anxiety                 Physical Exam  General: Well nourished, Cooperative, Alert and Oriented    Airway:  Mallampati: II   Mouth Opening: Normal  TM Distance: Normal  Tongue: Normal  Neck ROM: Normal ROM    Chest/Lungs:  Clear to auscultation, Normal Respiratory Rate    Heart:  Rate: Normal  Rhythm: Regular Rhythm    Abdomen:  Normal, Soft        Anesthesia Plan  Type of Anesthesia, risks & benefits discussed:    Anesthesia Type: MAC  Intra-op Monitoring Plan: Standard ASA Monitors  Post Op Pain Control Plan: multimodal analgesia  Induction:  IV  Informed Consent: Informed consent signed with the Patient and all parties understand the risks and agree with anesthesia plan.  All questions answered. Patient consented to  blood products? Yes  ASA Score: 3  Day of Surgery Review of History & Physical: H&P Update referred to the surgeon/provider.I have interviewed and examined the patient. I have reviewed the patient's H&P dated: There are no significant changes.     Ready For Surgery From Anesthesia Perspective.     .

## 2024-12-12 NOTE — ANESTHESIA POSTPROCEDURE EVALUATION
Anesthesia Post Evaluation    Patient: Humera Guerrero    Procedure(s) Performed: Procedure(s) (LRB):  Bilateral L4-5, L5-S1 MB RFTC under fluoroscopy (Bilateral)    Final Anesthesia Type: MAC      Patient location during evaluation: PACU  Patient participation: Yes- Able to Participate  Level of consciousness: awake and alert  Post-procedure vital signs: reviewed and stable  Pain management: adequate  Airway patency: patent    PONV status at discharge: No PONV  Anesthetic complications: no      Cardiovascular status: blood pressure returned to baseline  Respiratory status: unassisted  Hydration status: euvolemic  Follow-up not needed.              Vitals Value Taken Time   /71 12/12/24 1304   Temp 36.7 °C (98 °F) 12/12/24 1249   Pulse 70 12/12/24 1304   Resp 10 12/12/24 1304   SpO2 100 % 12/12/24 1304   Vitals shown include unfiled device data.      No case tracking events are documented in the log.      Pain/Ana Score: Ana Score: 8 (12/12/2024 12:45 PM)

## 2025-01-07 NOTE — PROGRESS NOTES
Subjective:         Patient ID: Humera Guerrero is a 44 y.o. female.    Chief Complaint: Low-back Pain      Pain  This is a chronic problem. The current episode started more than 1 year ago. The problem occurs daily. The problem has been gradually improving. Associated symptoms include arthralgias. Pertinent negatives include no anorexia, change in bowel habit, chest pain, chills, coughing, diaphoresis, fever, neck pain, rash, sore throat, swollen glands, urinary symptoms, vertigo or vomiting.     Review of Systems   Constitutional:  Negative for activity change, appetite change, chills, diaphoresis, fever and unexpected weight change.   HENT:  Negative for drooling, ear discharge, ear pain, facial swelling, nosebleeds, sore throat, trouble swallowing, voice change and goiter.    Eyes:  Negative for photophobia, pain, discharge, redness and visual disturbance.   Respiratory:  Negative for apnea, cough, choking, chest tightness, shortness of breath, wheezing and stridor.    Cardiovascular:  Negative for chest pain, palpitations and leg swelling.   Gastrointestinal:  Negative for abdominal distention, anorexia, change in bowel habit, diarrhea, rectal pain, vomiting and fecal incontinence.   Endocrine: Negative for cold intolerance, heat intolerance, polydipsia, polyphagia and polyuria.   Genitourinary:  Negative for bladder incontinence, dysuria, flank pain, frequency and hot flashes.   Musculoskeletal:  Positive for arthralgias, back pain and leg pain. Negative for neck pain.   Integumentary:  Negative for color change, pallor and rash.   Allergic/Immunologic: Negative for immunocompromised state.   Neurological:  Negative for dizziness, vertigo, seizures, syncope, facial asymmetry, speech difficulty, light-headedness, memory loss and coordination difficulties.   Hematological:  Negative for adenopathy. Does not bruise/bleed easily.   Psychiatric/Behavioral:  Negative for agitation, behavioral problems, confusion,  decreased concentration, dysphoric mood, hallucinations, self-injury and suicidal ideas. The patient is not nervous/anxious and is not hyperactive.            Past Medical History:   Diagnosis Date    Cerebral cyst     Other hyperlipidemia     Prediabetes     Vitamin D insufficiency      Past Surgical History:   Procedure Laterality Date     SECTION      HYSTERECTOMY Bilateral     INJECTION OF ANESTHETIC AGENT AROUND MEDIAL BRANCH NERVES INNERVATING LUMBAR FACET JOINT Bilateral 2023    Procedure: Bilateral L4-5,5-S1 MBB;  Surgeon: Carri Sanchez MD;  Location: [facility];  Service: Pain Management;  Laterality: Bilateral;    INJECTION OF ANESTHETIC AGENT AROUND MEDIAL BRANCH NERVES INNERVATING LUMBAR FACET JOINT Bilateral 2023    Procedure: Bilateral L4-5, 5-S1 medial branch block #2;  Surgeon: Carri Sanchez MD;  Location: [facility];  Service: Pain Management;  Laterality: Bilateral;    OOPHORECTOMY      RADIOFREQUENCY ABLATION OF LUMBAR MEDIAL BRANCH NERVE AT SINGLE LEVEL Bilateral 2023    Procedure: Bilateral L4-5,5-S1 MB RFTC;  Surgeon: Carri Sanchez MD;  Location: [facility];  Service: Pain Management;  Laterality: Bilateral;    RADIOFREQUENCY ABLATION OF LUMBAR MEDIAL BRANCH NERVE AT SINGLE LEVEL Bilateral 2024    Procedure: Bilateral L4-5, L5-S1 MB RFTC under fluoroscopy;  Surgeon: Carri Sanchez MD;  Location: [facility];  Service: Pain Management;  Laterality: Bilateral;    TUBAL LIGATION       Social History     Socioeconomic History    Marital status:     Number of children: 3    Highest education level: Master's degree (e.g., MA, MS, Maurice, MEd, MSW, LILIANA)   Occupational History     Comment:  and therapist   Tobacco Use    Smoking status: Former     Types: Cigars     Passive exposure: Past    Smokeless tobacco: Never   Substance and Sexual Activity    Alcohol use: Yes     Comment: 1 mixed drink ocassionally     "Drug use: Never    Sexual activity: Yes   Social History Narrative    Lives with  2 daughters and 1 adult son    No Pets.    No smoking in the home.     Social Drivers of Health     Financial Resource Strain: Low Risk  (4/16/2024)    Overall Financial Resource Strain (CARDIA)     Difficulty of Paying Living Expenses: Not hard at all   Food Insecurity: No Food Insecurity (4/16/2024)    Hunger Vital Sign     Worried About Running Out of Food in the Last Year: Never true     Ran Out of Food in the Last Year: Never true   Transportation Needs: No Transportation Needs (4/16/2024)    PRAPARE - Transportation     Lack of Transportation (Medical): No     Lack of Transportation (Non-Medical): No   Physical Activity: Insufficiently Active (4/16/2024)    Exercise Vital Sign     Days of Exercise per Week: 1 day     Minutes of Exercise per Session: 10 min   Stress: No Stress Concern Present (4/16/2024)    [place] [place] [employer] - Occupational Stress Questionnaire     Feeling of Stress : Only a little   Housing Stability: Unknown (4/16/2024)    Housing Stability Vital Sign     Unable to Pay for Housing in the Last Year: No     Family History   Problem Relation Name Age of Onset    Other Mother      Hypertension Mother      Hypertension Maternal Grandmother      Breast cancer Paternal Grandmother      Cancer Paternal Grandmother       Review of patient's allergies indicates:  No Known Allergies     Objective:  Vitals:    01/10/25 0759   BP: (!) 141/90   Pulse: 90   Resp: 16   Weight: 125.2 kg (276 lb)   Height: 5' 6" (1.676 m)   PainSc:   4           Physical Exam  Vitals and nursing note reviewed. Exam conducted with a chaperone present.   Constitutional:       General: She is awake. She is not in acute distress.     Appearance: Normal appearance. She is not ill-appearing, toxic-appearing or diaphoretic.   HENT:      Head: Normocephalic and atraumatic.      Nose: Nose normal.      Mouth/Throat:      " Mouth: Mucous membranes are moist.      Pharynx: Oropharynx is clear.   Eyes:      Conjunctiva/sclera: Conjunctivae normal.      Pupils: Pupils are equal, round, and reactive to light.   Cardiovascular:      Rate and Rhythm: Normal rate.   Pulmonary:      Effort: Pulmonary effort is normal. No respiratory distress.   Abdominal:      Palpations: Abdomen is soft.      Tenderness: There is no guarding.   Musculoskeletal:         General: Normal range of motion.      Cervical back: Normal range of motion and neck supple. No rigidity.   Skin:     General: Skin is warm and dry.      Coloration: Skin is not jaundiced or pale.   Neurological:      General: No focal deficit present.      Mental Status: She is alert and oriented to person, place, and time. Mental status is at baseline.      Cranial Nerves: No cranial nerve deficit (II-XII).   Psychiatric:         Mood and Affect: Mood normal.         Behavior: Behavior normal. Behavior is cooperative.         Thought Content: Thought content normal.           [place] Fluoro for Pain Management  See OP Notes for results.     IMPRESSION: See OP Notes for results.     This procedure was auto-finalized by: Virtual Radiologist       No visits with results within 6 Month(s) from this visit.   Latest known visit with results is:   Office Visit on 04/18/2024   Component Date Value Ref Range Status    Color, UA 04/18/2024 Yellow   Final    Spec Grav UA 04/18/2024 1.025   Final    pH, UA 04/18/2024 6.5   Final    WBC, UA 04/18/2024 Negative   Final    Nitrite, UA 04/18/2024 Negative   Final    Protein, POC 04/18/2024 Negative   Final    Glucose, UA 04/18/2024 Negative   Final    Ketones, UA 04/18/2024 Negative   Final    Bilirubin, POC 04/18/2024 Negative   Final    Urobilinogen, UA 04/18/2024 0.2   Final    Blood, UA 04/18/2024 Moderate   Final    Sodium 04/18/2024 140  136 - 145 mmol/L Final    Potassium 04/18/2024 4.2  3.5 - 5.1 mmol/L Final    Chloride 04/18/2024 104  98 - 107 mmol/L  Final    CO2 04/18/2024 26  21 - 32 mmol/L Final    Anion Gap 04/18/2024 14  7 - 16 mmol/L Final    Glucose 04/18/2024 100  74 - 106 mg/dL Final    BUN 04/18/2024 12  7 - 18 mg/dL Final    Creatinine 04/18/2024 1.10 (H)  0.55 - 1.02 mg/dL Final    BUN/Creatinine Ratio 04/18/2024 11  6 - 20 Final    Calcium 04/18/2024 10.1  8.5 - 10.1 mg/dL Final    Total Protein 04/18/2024 7.8  6.4 - 8.2 g/dL Final    Albumin 04/18/2024 3.9  3.5 - 5.0 g/dL Final    Globulin 04/18/2024 3.9  2.0 - 4.0 g/dL Final    A/G Ratio 04/18/2024 1.0   Final    Bilirubin, Total 04/18/2024 0.3  >0.0 - 1.2 mg/dL Final    Alk Phos 04/18/2024 81  37 - 98 U/L Final    ALT 04/18/2024 24  13 - 56 U/L Final    AST 04/18/2024 6 (L)  15 - 37 U/L Final    eGFR 04/18/2024 64  >=60 mL/min/1.73m2 Final    Hemoglobin A1C 04/18/2024 5.9  4.5 - 6.6 % Final    Estimated Average Glucose 04/18/2024 123  mg/dL Final    Triglycerides 04/18/2024 248 (H)  35 - 150 mg/dL Final    Cholesterol 04/18/2024 162  0 - 200 mg/dL Final    HDL Cholesterol 04/18/2024 50  40 - 60 mg/dL Final    Cholesterol/HDL Ratio (Risk Factor) 04/18/2024 3.2   Final    Non-HDL 04/18/2024 112  mg/dL Final    LDL Calculated 04/18/2024 62  mg/dL Final    LDL/HDL 04/18/2024 1.2   Final    VLDL 04/18/2024 50  mg/dL Final    Uric Acid 04/18/2024 6.6 (H)  2.6 - 6.0 mg/dL Final    TSH 04/18/2024 2.180  0.358 - 3.740 uIU/mL Final    WBC 04/18/2024 9.01  4.50 - 11.00 K/uL Final    RBC 04/18/2024 4.61  4.20 - 5.40 M/uL Final    Hemoglobin 04/18/2024 12.9  12.0 - 16.0 g/dL Final    Hematocrit 04/18/2024 41.1  38.0 - 47.0 % Final    MCV 04/18/2024 89.2  80.0 - 96.0 fL Final    MCH 04/18/2024 28.0  27.0 - 31.0 pg Final    MCHC 04/18/2024 31.4 (L)  32.0 - 36.0 g/dL Final    RDW 04/18/2024 14.9 (H)  11.5 - 14.5 % Final    Platelet Count 04/18/2024 348  150 - 400 K/uL Final    MPV 04/18/2024 10.6  9.4 - 12.4 fL Final    Neutrophils % 04/18/2024 56.9  53.0 - 65.0 % Final    Lymphocytes % 04/18/2024 36.3  27.0 -  41.0 % Final    Monocytes % 04/18/2024 4.9  2.0 - 6.0 % Final    Eosinophils % 04/18/2024 1.3  1.0 - 4.0 % Final    Basophils % 04/18/2024 0.4  0.0 - 1.0 % Final    Immature Granulocytes % 04/18/2024 0.2  0.0 - 0.4 % Final    nRBC, Auto 04/18/2024 0.0  <=0.0 % Final    Neutrophils, Abs 04/18/2024 5.12  1.80 - 7.70 K/uL Final    Lymphocytes, Absolute 04/18/2024 3.27  1.00 - 4.80 K/uL Final    Monocytes, Absolute 04/18/2024 0.44  0.00 - 0.80 K/uL Final    Eosinophils, Absolute 04/18/2024 0.12  0.00 - 0.50 K/uL Final    Basophils, Absolute 04/18/2024 0.04  0.00 - 0.20 K/uL Final    Immature Granulocytes, Absolute 04/18/2024 0.02  0.00 - 0.04 K/uL Final    nRBC, Absolute 04/18/2024 0.00  <=0.00 x10e3/uL Final    Diff Type 04/18/2024 Auto   Final         No orders of the defined types were placed in this encounter.      Requested Prescriptions     Signed Prescriptions Disp Refills    ketorolac (TORADOL) 10 mg tablet 40 tablet 0     Sig: Take 1 tablet (10 mg total) by mouth every 8 (eight) hours as needed for Pain.       Assessment:     1. Trochanteric bursitis, left hip    2. Lumbosacral spondylosis without myelopathy    3. Disorder of sacrum                 Plan:.    Will need Narcan if refills Tylenol # 3    Not using narcotics from our office    Follows rheumatology ARM inflammatory polyarthropathy    Follow-up after bilateral lumbar L4 through S1 RF TC December 12, 2024  She states she had 90% relief after procedure,   the procedure did help improve her level function    Procedure notes/fluoro images reviewed    Complaint left lateral thigh pain some lower back pain    She understands recovery phase 6-8 weeks after lumbar procedure     In regards her left lateral thigh pain appears to be trochanteric bursitis     She states her rheumatologist has given her injections for this in the past it did help     She is considering left trochanteric injection     After discussing options today she would like to proceed with      Toradol 60 mg IM, tolerated well     Toradol 10 mg 1 p.o. q.8 hours as needed    X-ray sacroiliac joint [facility] May 1, 2023 no fracture noted no evidence for sacroiliitis    MRI lumbar spine [facility] April 12, 2023 multiple level degenerative changes epidural lipomatosis noted L4/5, L5/S1    She states at this point she would like to continue conservative management    Continue home exercise program as directed     Follow-up as needed, patient request     Dr. Sanchez, December 2025    Bring original prescription medication bottles/container/box with labels to each visit

## 2025-01-10 ENCOUNTER — OFFICE VISIT (OUTPATIENT)
Dept: PAIN MEDICINE | Facility: CLINIC | Age: 45
End: 2025-01-10
Payer: COMMERCIAL

## 2025-01-10 VITALS
HEART RATE: 90 BPM | SYSTOLIC BLOOD PRESSURE: 141 MMHG | RESPIRATION RATE: 16 BRPM | BODY MASS INDEX: 44.36 KG/M2 | DIASTOLIC BLOOD PRESSURE: 90 MMHG | HEIGHT: 66 IN | WEIGHT: 276 LBS

## 2025-01-10 DIAGNOSIS — M70.62 TROCHANTERIC BURSITIS, LEFT HIP: Primary | Chronic | ICD-10-CM

## 2025-01-10 DIAGNOSIS — M47.817 LUMBOSACRAL SPONDYLOSIS WITHOUT MYELOPATHY: Chronic | ICD-10-CM

## 2025-01-10 DIAGNOSIS — M53.3 DISORDER OF SACRUM: Chronic | ICD-10-CM

## 2025-01-10 PROCEDURE — 99214 OFFICE O/P EST MOD 30 MIN: CPT | Mod: S$PBB,25,, | Performed by: PHYSICIAN ASSISTANT

## 2025-01-10 PROCEDURE — 1159F MED LIST DOCD IN RCRD: CPT | Mod: ,,, | Performed by: PHYSICIAN ASSISTANT

## 2025-01-10 PROCEDURE — 96372 THER/PROPH/DIAG INJ SC/IM: CPT | Mod: PBBFAC | Performed by: PHYSICIAN ASSISTANT

## 2025-01-10 PROCEDURE — 99999 PR PBB SHADOW E&M-EST. PATIENT-LVL IV: CPT | Mod: PBBFAC,,, | Performed by: PHYSICIAN ASSISTANT

## 2025-01-10 PROCEDURE — 3077F SYST BP >= 140 MM HG: CPT | Mod: ,,, | Performed by: PHYSICIAN ASSISTANT

## 2025-01-10 PROCEDURE — 99999PBSHW PR PBB SHADOW TECHNICAL ONLY FILED TO HB: Mod: PBBFAC,,,

## 2025-01-10 PROCEDURE — 3008F BODY MASS INDEX DOCD: CPT | Mod: ,,, | Performed by: PHYSICIAN ASSISTANT

## 2025-01-10 PROCEDURE — 4010F ACE/ARB THERAPY RXD/TAKEN: CPT | Mod: ,,, | Performed by: PHYSICIAN ASSISTANT

## 2025-01-10 PROCEDURE — 3080F DIAST BP >= 90 MM HG: CPT | Mod: ,,, | Performed by: PHYSICIAN ASSISTANT

## 2025-01-10 PROCEDURE — 99214 OFFICE O/P EST MOD 30 MIN: CPT | Mod: PBBFAC | Performed by: PHYSICIAN ASSISTANT

## 2025-01-10 RX ORDER — KETOROLAC TROMETHAMINE 30 MG/ML
60 INJECTION, SOLUTION INTRAMUSCULAR; INTRAVENOUS
Status: COMPLETED | OUTPATIENT
Start: 2025-01-10 | End: 2025-01-10

## 2025-01-10 RX ORDER — KETOROLAC TROMETHAMINE 10 MG/1
10 TABLET, FILM COATED ORAL EVERY 8 HOURS PRN
Qty: 40 TABLET | Refills: 0 | Status: SHIPPED | OUTPATIENT
Start: 2025-01-10

## 2025-01-10 RX ADMIN — KETOROLAC TROMETHAMINE 60 MG: 30 INJECTION, SOLUTION INTRAMUSCULAR at 08:01

## 2025-01-23 ENCOUNTER — OFFICE VISIT (OUTPATIENT)
Dept: FAMILY MEDICINE | Facility: CLINIC | Age: 45
End: 2025-01-23
Payer: COMMERCIAL

## 2025-01-23 VITALS
BODY MASS INDEX: 44.68 KG/M2 | HEART RATE: 81 BPM | WEIGHT: 278 LBS | HEIGHT: 66 IN | SYSTOLIC BLOOD PRESSURE: 133 MMHG | OXYGEN SATURATION: 99 % | DIASTOLIC BLOOD PRESSURE: 85 MMHG | RESPIRATION RATE: 20 BRPM | TEMPERATURE: 99 F

## 2025-01-23 DIAGNOSIS — R60.9 FLUID RETENTION: ICD-10-CM

## 2025-01-23 DIAGNOSIS — I10 HYPERTENSION, UNSPECIFIED TYPE: ICD-10-CM

## 2025-01-23 DIAGNOSIS — Z78.0 POST-MENOPAUSAL: ICD-10-CM

## 2025-01-23 DIAGNOSIS — E78.5 HYPERLIPIDEMIA, UNSPECIFIED HYPERLIPIDEMIA TYPE: Primary | ICD-10-CM

## 2025-01-23 DIAGNOSIS — R73.03 PREDIABETES: ICD-10-CM

## 2025-01-23 PROBLEM — E66.01 CLASS 3 SEVERE OBESITY WITH BODY MASS INDEX (BMI) OF 45.0 TO 49.9 IN ADULT: Status: RESOLVED | Noted: 2022-05-20 | Resolved: 2025-01-23

## 2025-01-23 PROBLEM — E66.813 CLASS 3 SEVERE OBESITY WITH BODY MASS INDEX (BMI) OF 45.0 TO 49.9 IN ADULT: Status: RESOLVED | Noted: 2022-05-20 | Resolved: 2025-01-23

## 2025-01-23 PROCEDURE — 3008F BODY MASS INDEX DOCD: CPT | Mod: ,,, | Performed by: NURSE PRACTITIONER

## 2025-01-23 PROCEDURE — 1160F RVW MEDS BY RX/DR IN RCRD: CPT | Mod: ,,, | Performed by: NURSE PRACTITIONER

## 2025-01-23 PROCEDURE — 99213 OFFICE O/P EST LOW 20 MIN: CPT | Mod: ,,, | Performed by: NURSE PRACTITIONER

## 2025-01-23 PROCEDURE — 1159F MED LIST DOCD IN RCRD: CPT | Mod: ,,, | Performed by: NURSE PRACTITIONER

## 2025-01-23 PROCEDURE — 3079F DIAST BP 80-89 MM HG: CPT | Mod: ,,, | Performed by: NURSE PRACTITIONER

## 2025-01-23 PROCEDURE — 3075F SYST BP GE 130 - 139MM HG: CPT | Mod: ,,, | Performed by: NURSE PRACTITIONER

## 2025-01-23 PROCEDURE — 4010F ACE/ARB THERAPY RXD/TAKEN: CPT | Mod: ,,, | Performed by: NURSE PRACTITIONER

## 2025-01-23 RX ORDER — SPIRONOLACTONE 25 MG/1
25 TABLET ORAL DAILY
Qty: 30 TABLET | Refills: 11 | Status: SHIPPED | OUTPATIENT
Start: 2025-01-23 | End: 2026-01-23

## 2025-01-23 RX ORDER — ESTRADIOL 1 MG/1
1 TABLET ORAL DAILY
Qty: 90 TABLET | Refills: 3 | Status: SHIPPED | OUTPATIENT
Start: 2025-01-23 | End: 2026-01-18

## 2025-01-23 NOTE — PROGRESS NOTES
SHANNON Callaway        PATIENT NAME: Humera Guerrero  : 1980  DATE: 25  MRN: [id number]      Patient PCP Information       Provider PCP Type    Sultana SARAHY SHANNON Neil [facility]            Reason for Visit / Chief Complaint: Follow-up and Hypertension (Pt presents to the clinic for 3m f/u)       Update PCP  Update Chief Complaint         History of Present Illness / Problem Focused Workflow     Humera Guerrero presents to the clinic with Follow-up and Hypertension (Pt presents to the clinic for 3m f/u)     HPI    Patient presents to clinic HTN, HLD and prediabetes. BP stable.   Medical / Social / Family History     Past Medical History:   Diagnosis Date    Cerebral cyst     Other hyperlipidemia     Prediabetes     Vitamin D insufficiency        Past Surgical History:   Procedure Laterality Date     SECTION      HYSTERECTOMY Bilateral     INJECTION OF ANESTHETIC AGENT AROUND MEDIAL BRANCH NERVES INNERVATING LUMBAR FACET JOINT Bilateral 2023    Procedure: Bilateral L4-5,5-S1 MBB;  Surgeon: Carri Sanchez MD;  Location: [facility];  Service: Pain Management;  Laterality: Bilateral;    INJECTION OF ANESTHETIC AGENT AROUND MEDIAL BRANCH NERVES INNERVATING LUMBAR FACET JOINT Bilateral 2023    Procedure: Bilateral L4-5, 5-S1 medial branch block #2;  Surgeon: Carri Sanchez MD;  Location: [facility];  Service: Pain Management;  Laterality: Bilateral;    OOPHORECTOMY      RADIOFREQUENCY ABLATION OF LUMBAR MEDIAL BRANCH NERVE AT SINGLE LEVEL Bilateral 2023    Procedure: Bilateral L4-5,5-S1 MB RFTC;  Surgeon: Carri Sanchez MD;  Location: [facility];  Service: Pain Management;  Laterality: Bilateral;    RADIOFREQUENCY ABLATION OF LUMBAR MEDIAL BRANCH NERVE AT SINGLE LEVEL Bilateral 2024    Procedure: Bilateral L4-5, L5-S1 MB RFTC under fluoroscopy;  Surgeon: Carri Sanchez MD;  Location: [facility];  Service: Pain Management;   "Laterality: Bilateral;    TUBAL LIGATION         Social History  Ms.  reports that she has quit smoking. Her smoking use included cigars. She has been exposed to tobacco smoke. She has never used smokeless tobacco. She reports current alcohol use. She reports that she does not use drugs.    Family History  Ms.'s family history includes Breast cancer in her paternal grandmother; Cancer in her paternal grandmother; Hypertension in her maternal grandmother and mother; Other in her mother.    Medications and Allergies     Medications  Outpatient Medications Marked as Taking for the 1/23/25 encounter (Office Visit) with Sultana Neil FNP   Medication Sig Dispense Refill    atorvastatin (LIPITOR) 20 MG tablet Take 1 tablet (20 mg total) by mouth once daily. 90 tablet 3    ketorolac (TORADOL) 10 mg tablet Take 1 tablet (10 mg total) by mouth every 8 (eight) hours as needed for Pain. 40 tablet 0    losartan (COZAAR) 50 MG tablet TAKE 1 TABLET BY MOUTH EVERY DAY 90 tablet 3    [DISCONTINUED] estradioL (ESTRACE) 1 MG tablet Take 1 tablet (1 mg total) by mouth once daily. 90 tablet 3       Allergies  Review of patient's allergies indicates:  No Known Allergies    Physical Examination     Vitals:    01/23/25 0812   BP: 133/85   BP Location: Left arm   Patient Position: Sitting   Pulse: 81   Resp: 20   Temp: 98.7 °F (37.1 °C)   TempSrc: Oral   SpO2: 99%   Weight: 126.1 kg (278 lb)   Height: 5' 6" (1.676 m)       Physical Exam  Vitals reviewed.   Constitutional:       Appearance: Normal appearance. She is obese.   HENT:      Head: Normocephalic.      Right Ear: External ear normal.      Left Ear: External ear normal.      Nose: Nose normal.      Mouth/Throat:      Mouth: Mucous membranes are moist.   Eyes:      Extraocular Movements: Extraocular movements intact.   Cardiovascular:      Rate and Rhythm: Normal rate and regular rhythm.      Pulses: Normal pulses.      Heart sounds: Normal heart sounds.   Pulmonary:      " Effort: Pulmonary effort is normal. No respiratory distress.      Breath sounds: Normal breath sounds. No stridor. No wheezing, rhonchi or rales.   Chest:      Chest wall: No tenderness.   Musculoskeletal:         General: Normal range of motion.      Cervical back: Normal range of motion.   Skin:     General: Skin is warm and dry.      Capillary Refill: Capillary refill takes less than 2 seconds.   Neurological:      General: No focal deficit present.      Mental Status: She is alert and oriented to person, place, and time.   Psychiatric:         Mood and Affect: Mood normal.         Behavior: Behavior normal.         Thought Content: Thought content normal.         Judgment: Judgment normal.           No visits with results within 14 Day(s) from this visit.   Latest known visit with results is:   Office Visit on 04/18/2024   Component Date Value Ref Range Status    Color, UA 04/18/2024 Yellow   Final    Spec Grav UA 04/18/2024 1.025   Final    pH, UA 04/18/2024 6.5   Final    WBC, UA 04/18/2024 Negative   Final    Nitrite, UA 04/18/2024 Negative   Final    Protein, POC 04/18/2024 Negative   Final    Glucose, UA 04/18/2024 Negative   Final    Ketones, UA 04/18/2024 Negative   Final    Bilirubin, POC 04/18/2024 Negative   Final    Urobilinogen, UA 04/18/2024 0.2   Final    Blood, UA 04/18/2024 Moderate   Final    Sodium 04/18/2024 140  136 - 145 mmol/L Final    Potassium 04/18/2024 4.2  3.5 - 5.1 mmol/L Final    Chloride 04/18/2024 104  98 - 107 mmol/L Final    CO2 04/18/2024 26  21 - 32 mmol/L Final    Anion Gap 04/18/2024 14  7 - 16 mmol/L Final    Glucose 04/18/2024 100  74 - 106 mg/dL Final    BUN 04/18/2024 12  7 - 18 mg/dL Final    Creatinine 04/18/2024 1.10 (H)  0.55 - 1.02 mg/dL Final    BUN/Creatinine Ratio 04/18/2024 11  6 - 20 Final    Calcium 04/18/2024 10.1  8.5 - 10.1 mg/dL Final    Total Protein 04/18/2024 7.8  6.4 - 8.2 g/dL Final    Albumin 04/18/2024 3.9  3.5 - 5.0 g/dL Final    Globulin 04/18/2024  3.9  2.0 - 4.0 g/dL Final    A/G Ratio 04/18/2024 1.0   Final    Bilirubin, Total 04/18/2024 0.3  >0.0 - 1.2 mg/dL Final    Alk Phos 04/18/2024 81  37 - 98 U/L Final    ALT 04/18/2024 24  13 - 56 U/L Final    AST 04/18/2024 6 (L)  15 - 37 U/L Final    eGFR 04/18/2024 64  >=60 mL/min/1.73m2 Final    Hemoglobin A1C 04/18/2024 5.9  4.5 - 6.6 % Final      Normal:               <5.7%  Pre-Diabetic:       5.7% to 6.4%  Diabetic:             >6.4%  Diabetic Goal:     <7%    Estimated Average Glucose 04/18/2024 123  mg/dL Final    Triglycerides 04/18/2024 248 (H)  35 - 150 mg/dL Final      Normal:  <150 mg/dL  Borderline High: 150-199 mg/dL  High:   200-499 mg/dL  Very High:  >=500    Cholesterol 04/18/2024 162  0 - 200 mg/dL Final      <200 mg/dL:  Desirable  200-240 mg/dL: Borderline High  >240 mg/dL:  High    HDL Cholesterol 04/18/2024 50  40 - 60 mg/dL Final      <40 mg/dL: Low HDL  40-60 mg/dL: Normal  >60 mg/dL: Desirable    Cholesterol/HDL Ratio (Risk Factor) 04/18/2024 3.2   Final    Non-HDL 04/18/2024 112  mg/dL Final    LDL Calculated 04/18/2024 62  mg/dL Final    Unable to calculate due to one of the following values:  Cholesterol <5  HDL Cholesterol <5  Triglycerides <10 or >400    LDL/HDL 04/18/2024 1.2   Final    Unable to calculate due to one of the following values:  Cholesterol <5  HDL Cholesterol <5  Triglycerides <10 or >400    VLDL 04/18/2024 50  mg/dL Final    Uric Acid 04/18/2024 6.6 (H)  2.6 - 6.0 mg/dL Final    TSH 04/18/2024 2.180  0.358 - 3.740 uIU/mL Final    WBC 04/18/2024 9.01  4.50 - 11.00 K/uL Final    RBC 04/18/2024 4.61  4.20 - 5.40 M/uL Final    Hemoglobin 04/18/2024 12.9  12.0 - 16.0 g/dL Final    Hematocrit 04/18/2024 41.1  38.0 - 47.0 % Final    MCV 04/18/2024 89.2  80.0 - 96.0 fL Final    MCH 04/18/2024 28.0  27.0 - 31.0 pg Final    MCHC 04/18/2024 31.4 (L)  32.0 - 36.0 g/dL Final    RDW 04/18/2024 14.9 (H)  11.5 - 14.5 % Final    Platelet Count 04/18/2024 348  150 - 400 K/uL Final     MPV 04/18/2024 10.6  9.4 - 12.4 fL Final    Neutrophils % 04/18/2024 56.9  53.0 - 65.0 % Final    Lymphocytes % 04/18/2024 36.3  27.0 - 41.0 % Final    Monocytes % 04/18/2024 4.9  2.0 - 6.0 % Final    Eosinophils % 04/18/2024 1.3  1.0 - 4.0 % Final    Basophils % 04/18/2024 0.4  0.0 - 1.0 % Final    Immature Granulocytes % 04/18/2024 0.2  0.0 - 0.4 % Final    nRBC, Auto 04/18/2024 0.0  <=0.0 % Final    Neutrophils, Abs 04/18/2024 5.12  1.80 - 7.70 K/uL Final    Lymphocytes, Absolute 04/18/2024 3.27  1.00 - 4.80 K/uL Final    Monocytes, Absolute 04/18/2024 0.44  0.00 - 0.80 K/uL Final    Eosinophils, Absolute 04/18/2024 0.12  0.00 - 0.50 K/uL Final    Basophils, Absolute 04/18/2024 0.04  0.00 - 0.20 K/uL Final    Immature Granulocytes, Absolute 04/18/2024 0.02  0.00 - 0.04 K/uL Final    nRBC, Absolute 04/18/2024 0.00  <=0.00 x10e3/uL Final    Diff Type 04/18/2024 Auto   Final             Assessment and Plan (including Health Maintenance)      Problem List  Smart Sets  Document Outside HM   :    Plan:     1. Hyperlipidemia, unspecified hyperlipidemia type  Assessment & Plan:  Continue atorvostatin      2. Fluid retention  -     spironolactone (ALDACTONE) 25 MG tablet; Take 1 tablet (25 mg total) by mouth once daily.  Dispense: 30 tablet; Refill: 11    3. Post-menopausal  -     estradioL (ESTRACE) 1 MG tablet; Take 1 tablet (1 mg total) by mouth once daily.  Dispense: 90 tablet; Refill: 3    4. Hypertension, unspecified type  Assessment & Plan:  Well controlled continue losartan, spironolactone for fluid retention      5. Prediabetes  Assessment & Plan:  ADA diet   Hemoglobin A1C   Date Value Ref Range Status   04/18/2024 5.9 4.5 - 6.6 % Final     Comment:       Normal:               <5.7%  Pre-Diabetic:       5.7% to 6.4%  Diabetic:             >6.4%  Diabetic Goal:     <7%   07/18/2022 5.6 4.5 - 6.6 % Final     Comment:       Normal:               <5.7%  Pre-Diabetic:       5.7% to 6.4%  Diabetic:              >6.4%  Diabetic Goal:     <7%   04/08/2022 5.8 4.5 - 6.6 % Final     Comment:       Normal:               <5.7%  Pre-Diabetic:       5.7% to 6.4%  Diabetic:             >6.4%  Diabetic Goal:     <7%             RTC as scheduled   There are no Patient Instructions on file for this visit.       Health Maintenance Due   Topic Date Due    COVID-19 Vaccine (1 - 2024-25 season) Never done       Most Recent Immunizations   Administered Date(s) Administered    Hepatitis B, Adult 01/10/2023    Influenza 10/20/2022    Influenza - Quadrivalent - PF *Preferred* (6 months and older) 10/23/2023    MMR 10/23/2023    Tdap 10/25/2022    Varicella 01/10/2023            Health Maintenance Topics with due status: Not Due       Topic Last Completion Date    TETANUS VACCINE 10/25/2022    Hemoglobin A1c (Prediabetes) 04/18/2024    Mammogram 11/15/2024    RSV Vaccine (Age 60+ and Pregnant patients) Not Due       Future Appointments   Date Time Provider Department Center   4/21/2025  9:40 AM Kenny Benítez, NP [facility]   11/21/2025  9:15 AM [facility]            Signature:  SHANNON Callaway  [facility]     Date of encounter: 1/23/25

## 2025-01-23 NOTE — ASSESSMENT & PLAN NOTE
ADA diet   Hemoglobin A1C   Date Value Ref Range Status   04/18/2024 5.9 4.5 - 6.6 % Final     Comment:       Normal:               <5.7%  Pre-Diabetic:       5.7% to 6.4%  Diabetic:             >6.4%  Diabetic Goal:     <7%   07/18/2022 5.6 4.5 - 6.6 % Final     Comment:       Normal:               <5.7%  Pre-Diabetic:       5.7% to 6.4%  Diabetic:             >6.4%  Diabetic Goal:     <7%   04/08/2022 5.8 4.5 - 6.6 % Final     Comment:       Normal:               <5.7%  Pre-Diabetic:       5.7% to 6.4%  Diabetic:             >6.4%  Diabetic Goal:     <7%

## 2025-04-02 ENCOUNTER — HOSPITAL ENCOUNTER (EMERGENCY)
Facility: HOSPITAL | Age: 45
Discharge: HOME | End: 2025-04-02
Payer: COMMERCIAL

## 2025-04-02 VITALS
BODY MASS INDEX: 44.68 KG/M2 | HEART RATE: 85 BPM | SYSTOLIC BLOOD PRESSURE: 154 MMHG | DIASTOLIC BLOOD PRESSURE: 96 MMHG | TEMPERATURE: 98 F | RESPIRATION RATE: 18 BRPM | WEIGHT: 278 LBS | HEIGHT: 66 IN | OXYGEN SATURATION: 99 %

## 2025-04-02 DIAGNOSIS — R07.9 CHEST PAIN: ICD-10-CM

## 2025-04-02 DIAGNOSIS — R42 DIZZINESS: ICD-10-CM

## 2025-04-02 DIAGNOSIS — R20.2 PARESTHESIAS: Primary | ICD-10-CM

## 2025-04-02 LAB
ALBUMIN SERPL BCP-MCNC: 4 G/DL (ref 3.5–5)
ALBUMIN/GLOB SERPL: 1.1 {RATIO}
ALP SERPL-CCNC: 74 U/L (ref 40–150)
ALT SERPL W P-5'-P-CCNC: 17 U/L
ANION GAP SERPL CALCULATED.3IONS-SCNC: 12 MMOL/L (ref 7–16)
AST SERPL W P-5'-P-CCNC: 37 U/L (ref 11–45)
BACTERIA #/AREA URNS HPF: ABNORMAL /HPF
BASOPHILS # BLD AUTO: 0.03 K/UL (ref 0–0.2)
BASOPHILS NFR BLD AUTO: 0.3 % (ref 0–1)
BILIRUB SERPL-MCNC: 0.3 MG/DL
BILIRUB UR QL STRIP: NEGATIVE
BUN SERPL-MCNC: 12 MG/DL (ref 7–19)
BUN/CREAT SERPL: 11 (ref 6–20)
CALCIUM SERPL-MCNC: 9.8 MG/DL (ref 8.4–10.2)
CHLORIDE SERPL-SCNC: 103 MMOL/L (ref 98–107)
CLARITY UR: CLEAR
CO2 SERPL-SCNC: 24 MMOL/L (ref 22–29)
COLOR UR: COLORLESS
CREAT SERPL-MCNC: 1.05 MG/DL (ref 0.55–1.02)
DIFFERENTIAL METHOD BLD: ABNORMAL
EGFR (NO RACE VARIABLE) (RUSH/TITUS): 68 ML/MIN/1.73M2
EOSINOPHIL # BLD AUTO: 0.16 K/UL (ref 0–0.5)
EOSINOPHIL NFR BLD AUTO: 1.4 % (ref 1–4)
ERYTHROCYTE [DISTWIDTH] IN BLOOD BY AUTOMATED COUNT: 14.6 % (ref 11.5–14.5)
GLOBULIN SER-MCNC: 3.5 G/DL (ref 2–4)
GLUCOSE SERPL-MCNC: 88 MG/DL (ref 74–100)
GLUCOSE UR STRIP-MCNC: NORMAL MG/DL
HCT VFR BLD AUTO: 40.8 % (ref 38–47)
HGB BLD-MCNC: 12.9 G/DL (ref 12–16)
IMM GRANULOCYTES # BLD AUTO: 0.04 K/UL (ref 0–0.04)
IMM GRANULOCYTES NFR BLD: 0.3 % (ref 0–0.4)
INFLUENZA A MOLECULAR (OHS): NEGATIVE
INFLUENZA B MOLECULAR (OHS): NEGATIVE
KETONES UR STRIP-SCNC: NEGATIVE MG/DL
LEUKOCYTE ESTERASE UR QL STRIP: NEGATIVE
LYMPHOCYTES # BLD AUTO: 3.59 K/UL (ref 1–4.8)
LYMPHOCYTES NFR BLD AUTO: 30.4 % (ref 27–41)
MAGNESIUM SERPL-MCNC: 2.1 MG/DL (ref 1.6–2.6)
MCH RBC QN AUTO: 28.1 PG (ref 27–31)
MCHC RBC AUTO-ENTMCNC: 31.6 G/DL (ref 32–36)
MCV RBC AUTO: 88.9 FL (ref 80–96)
MONOCYTES # BLD AUTO: 0.66 K/UL (ref 0–0.8)
MONOCYTES NFR BLD AUTO: 5.6 % (ref 2–6)
MPC BLD CALC-MCNC: 10 FL (ref 9.4–12.4)
MUCOUS, UA: ABNORMAL /LPF
NEUTROPHILS # BLD AUTO: 7.34 K/UL (ref 1.8–7.7)
NEUTROPHILS NFR BLD AUTO: 62 % (ref 53–65)
NITRITE UR QL STRIP: NEGATIVE
NRBC # BLD AUTO: 0 X10E3/UL
NRBC, AUTO (.00): 0 %
PH UR STRIP: 6 PH UNITS
PLATELET # BLD AUTO: 336 K/UL (ref 150–400)
POTASSIUM SERPL-SCNC: 3.6 MMOL/L (ref 3.5–5.1)
PROT SERPL-MCNC: 7.5 G/DL (ref 6.4–8.3)
PROT UR QL STRIP: NEGATIVE
RBC # BLD AUTO: 4.59 M/UL (ref 4.2–5.4)
RBC # UR STRIP: ABNORMAL /UL
RBC #/AREA URNS HPF: 13 /HPF
SARS-COV-2 RDRP RESP QL NAA+PROBE: NEGATIVE
SODIUM SERPL-SCNC: 135 MMOL/L (ref 136–145)
SP GR UR STRIP: 1.01
SQUAMOUS #/AREA URNS LPF: ABNORMAL /HPF
TROPONIN I SERPL HS-MCNC: <2.7 NG/L
TSH SERPL DL<=0.005 MIU/L-ACNC: 2.71 UIU/ML (ref 0.35–4.94)
UROBILINOGEN UR STRIP-ACNC: NORMAL MG/DL
WBC # BLD AUTO: 11.82 K/UL (ref 4.5–11)
WBC #/AREA URNS HPF: 1 /HPF

## 2025-04-02 PROCEDURE — 81003 URINALYSIS AUTO W/O SCOPE: CPT | Performed by: NURSE PRACTITIONER

## 2025-04-02 PROCEDURE — 93005 ELECTROCARDIOGRAM TRACING: CPT

## 2025-04-02 PROCEDURE — 99285 EMERGENCY DEPT VISIT HI MDM: CPT | Mod: 25

## 2025-04-02 PROCEDURE — 84484 ASSAY OF TROPONIN QUANT: CPT | Performed by: NURSE PRACTITIONER

## 2025-04-02 PROCEDURE — 36415 COLL VENOUS BLD VENIPUNCTURE: CPT | Performed by: NURSE PRACTITIONER

## 2025-04-02 PROCEDURE — 80053 COMPREHEN METABOLIC PANEL: CPT | Performed by: NURSE PRACTITIONER

## 2025-04-02 PROCEDURE — 83735 ASSAY OF MAGNESIUM: CPT | Performed by: NURSE PRACTITIONER

## 2025-04-02 PROCEDURE — 85025 COMPLETE CBC W/AUTO DIFF WBC: CPT | Performed by: NURSE PRACTITIONER

## 2025-04-02 PROCEDURE — 87635 SARS-COV-2 COVID-19 AMP PRB: CPT | Performed by: NURSE PRACTITIONER

## 2025-04-02 PROCEDURE — 96372 THER/PROPH/DIAG INJ SC/IM: CPT | Performed by: NURSE PRACTITIONER

## 2025-04-02 PROCEDURE — 93010 ELECTROCARDIOGRAM REPORT: CPT | Mod: ,,, | Performed by: HOSPITALIST

## 2025-04-02 PROCEDURE — 87502 INFLUENZA DNA AMP PROBE: CPT | Performed by: NURSE PRACTITIONER

## 2025-04-02 PROCEDURE — 63600175 PHARM REV CODE 636 W HCPCS: Performed by: NURSE PRACTITIONER

## 2025-04-02 RX ORDER — GABAPENTIN 100 MG/1
100 CAPSULE ORAL 3 TIMES DAILY
Qty: 90 CAPSULE | Refills: 0 | Status: SHIPPED | OUTPATIENT
Start: 2025-04-02 | End: 2025-05-02

## 2025-04-02 RX ORDER — PREDNISONE 50 MG/1
50 TABLET ORAL DAILY
Qty: 5 TABLET | Refills: 0 | Status: SHIPPED | OUTPATIENT
Start: 2025-04-02 | End: 2025-04-07

## 2025-04-02 RX ORDER — DEXAMETHASONE SODIUM PHOSPHATE 4 MG/ML
4 INJECTION, SOLUTION INTRA-ARTICULAR; INTRALESIONAL; INTRAMUSCULAR; INTRAVENOUS; SOFT TISSUE
Status: COMPLETED | OUTPATIENT
Start: 2025-04-02 | End: 2025-04-02

## 2025-04-02 RX ORDER — KETOROLAC TROMETHAMINE 30 MG/ML
30 INJECTION, SOLUTION INTRAMUSCULAR; INTRAVENOUS
Status: COMPLETED | OUTPATIENT
Start: 2025-04-02 | End: 2025-04-02

## 2025-04-02 RX ORDER — IBUPROFEN 800 MG/1
800 TABLET ORAL EVERY 6 HOURS PRN
Qty: 20 TABLET | Refills: 0 | Status: SHIPPED | OUTPATIENT
Start: 2025-04-02

## 2025-04-02 RX ADMIN — KETOROLAC TROMETHAMINE 30 MG: 30 INJECTION, SOLUTION INTRAMUSCULAR at 11:04

## 2025-04-02 RX ADMIN — DEXAMETHASONE SODIUM PHOSPHATE 4 MG: 4 INJECTION, SOLUTION INTRA-ARTICULAR; INTRALESIONAL; INTRAMUSCULAR; INTRAVENOUS; SOFT TISSUE at 11:04

## 2025-04-03 ENCOUNTER — TELEPHONE (OUTPATIENT)
Dept: EMERGENCY MEDICINE | Facility: HOSPITAL | Age: 45
End: 2025-04-03
Payer: COMMERCIAL

## 2025-04-04 LAB
OHS QRS DURATION: 76 MS
OHS QTC CALCULATION: 427 MS

## 2025-04-04 NOTE — ED PROVIDER NOTES
"Encounter Date: 2025       History     Chief Complaint   Patient presents with    Numbness     Pt c/o right arm and hand numbness and tingling that started today, pt also reports feeling "fullness" in chest, HA, dizziness and sinus pressure      44 year old female presents to ED with complaint of right arm/hand numbness/tingling, dizziness, headache, and chest pressure. Patient states she woke up this morning with her hand tingling and thought she may have slept wrong. She states the feeling continued throughout the day and she noted feeling going up into her arm. She states she works from home and stood up to stretch and became dizzy and had to stand a minute to gather herself to prevent falling. She reports having chest pressure and felt she may need to be evaluated to rule out serious issues. Denies fall or recent injury. Denies heavy lifting/pulling. Patient reports having sciatica to lower legs due to L4-L5 spondylosis and states pain is somewhat the same but different. Denies cough, fever, chills, shortness of breath, nausea/vomiting. She reports other PMH of HTN, HLD; reports use of estradiol after having hysterectomy for HRT.     The history is provided by the patient.     Review of patient's allergies indicates:  No Known Allergies  Past Medical History:   Diagnosis Date    Cerebral cyst     Other hyperlipidemia     Prediabetes     Vitamin D insufficiency      Past Surgical History:   Procedure Laterality Date     SECTION      HYSTERECTOMY Bilateral     INJECTION OF ANESTHETIC AGENT AROUND MEDIAL BRANCH NERVES INNERVATING LUMBAR FACET JOINT Bilateral 2023    Procedure: Bilateral L4-5,5-S1 MBB;  Surgeon: Carri Sanchez MD;  Location: [facility];  Service: Pain Management;  Laterality: Bilateral;    INJECTION OF ANESTHETIC AGENT AROUND MEDIAL BRANCH NERVES INNERVATING LUMBAR FACET JOINT Bilateral 2023    Procedure: Bilateral L4-5, 5-S1 medial branch block #2;  Surgeon: Carri" KIRIT Sanchez MD;  Location: [facility];  Service: Pain Management;  Laterality: Bilateral;    OOPHORECTOMY      RADIOFREQUENCY ABLATION OF LUMBAR MEDIAL BRANCH NERVE AT SINGLE LEVEL Bilateral 08/17/2023    Procedure: Bilateral L4-5,5-S1 MB RFTC;  Surgeon: Carri Sanchez MD;  Location: [facility];  Service: Pain Management;  Laterality: Bilateral;    RADIOFREQUENCY ABLATION OF LUMBAR MEDIAL BRANCH NERVE AT SINGLE LEVEL Bilateral 12/12/2024    Procedure: Bilateral L4-5, L5-S1 MB RFTC under fluoroscopy;  Surgeon: Carri Sanchez MD;  Location: [facility];  Service: Pain Management;  Laterality: Bilateral;    TUBAL LIGATION       Family History   Problem Relation Name Age of Onset    Other Mother      Hypertension Mother      Hypertension Maternal Grandmother      Breast cancer Paternal Grandmother      Cancer Paternal Grandmother       Social History[1]  Review of Systems   Constitutional:  Negative for chills and fever.   Eyes:  Negative for photophobia and visual disturbance.   Respiratory:  Positive for chest tightness. Negative for cough and shortness of breath.    Cardiovascular:  Positive for chest pain. Negative for palpitations.   Gastrointestinal:  Negative for nausea and vomiting.   Musculoskeletal:  Positive for arthralgias. Negative for myalgias.   Skin:  Negative for color change and wound.   Neurological:  Positive for weakness and numbness.   Hematological:  Negative for adenopathy. Does not bruise/bleed easily.   Psychiatric/Behavioral:  Negative for agitation and confusion.    All other systems reviewed and are negative.      Physical Exam     Initial Vitals [04/02/25 2002]   BP Pulse Resp Temp SpO2   (!) 154/96 85 18 98.1 °F (36.7 °C) 99 %      MAP       --         Physical Exam    Nursing note and vitals reviewed.  Constitutional: She appears well-developed and well-nourished.   HENT:   Head: Normocephalic and atraumatic.   Eyes: EOM are normal. Pupils are equal, round, and  reactive to light.   Neck: Neck supple.   Normal range of motion.  Cardiovascular:  Normal rate and regular rhythm.           No murmur heard.  Pulmonary/Chest: She has no wheezes. She has no rhonchi.   Abdominal: Abdomen is soft. She exhibits no distension. There is no abdominal tenderness.   Musculoskeletal:         General: No tenderness or edema.      Cervical back: Normal range of motion and neck supple.     Lymphadenopathy:     She has no cervical adenopathy.   Neurological: She is alert and oriented to person, place, and time. No cranial nerve deficit or sensory deficit.   Skin: Skin is warm and dry. Capillary refill takes less than 2 seconds.   Psychiatric: She has a normal mood and affect. Thought content normal.         Medical Screening Exam   See Full Note    ED Course   Procedures  Labs Reviewed   COMPREHENSIVE METABOLIC PANEL - Abnormal       Result Value    Sodium 135 (*)     Potassium 3.6      Chloride 103      CO2 24      Anion Gap 12      Glucose 88      BUN 12      Creatinine 1.05 (*)     BUN/Creatinine Ratio 11      Calcium 9.8      Total Protein 7.5      Albumin 4.0      Globulin 3.5      A/G Ratio 1.1      Bilirubin, Total 0.3      Alk Phos 74      ALT 17      AST 37      eGFR 68     URINALYSIS, REFLEX TO URINE CULTURE - Abnormal    Color, UA Colorless      Clarity, UA Clear      pH, UA 6.0      Leukocytes, UA Negative      Nitrites, UA Negative      Protein, UA Negative      Glucose, UA Normal      Ketones, UA Negative      Urobilinogen, UA Normal      Bilirubin, UA Negative      Blood, UA Moderate (*)     Specific Gravity, UA 1.010     CBC WITH DIFFERENTIAL - Abnormal    WBC 11.82 (*)     RBC 4.59      Hemoglobin 12.9      Hematocrit 40.8      MCV 88.9      MCH 28.1      MCHC 31.6 (*)     RDW 14.6 (*)     Platelet Count 336      MPV 10.0      Neutrophils % 62.0      Lymphocytes % 30.4      Monocytes % 5.6      Eosinophils % 1.4      Basophils % 0.3      Immature Granulocytes % 0.3      nRBC,  Auto 0.0      Neutrophils, Abs 7.34      Lymphocytes, Absolute 3.59      Monocytes, Absolute 0.66      Eosinophils, Absolute 0.16      Basophils, Absolute 0.03      Immature Granulocytes, Absolute 0.04      nRBC, Absolute 0.00      Diff Type Auto     URINALYSIS, MICROSCOPIC - Abnormal    WBC, UA 1      RBC, UA 13 (*)     Bacteria, UA Occasional (*)     Squamous Epithelial Cells, UA Occasional (*)     Mucous Occasional (*)    INFLUENZA A & B BY MOLECULAR - Normal    INFLUENZA A MOLECULAR Negative      INFLUENZA B MOLECULAR  Negative     MAGNESIUM - Normal    Magnesium 2.1     TSH - Normal    TSH 2.715     TROPONIN I - Normal    Troponin I High Sensitivity <2.7     SARS-COV-2 RNA AMPLIFICATION, QUAL - Normal    SARS COV-2 Molecular Negative      Narrative:     Negative SARS-CoV results should not be used as the sole basis for treatment or patient management decisions; negative results should be considered in the context of a patient's recent exposures, history and the presene of clinical signs and symptoms consistent with COVID-19.  Negative results should be treated as presumptive and confirmed by molecular assay, if necessary for patient management.   CBC W/ AUTO DIFFERENTIAL    Narrative:     The following orders were created for panel order CBC auto differential.  Procedure                               Abnormality         Status                     ---------                               -----------         ------                     CBC with Differential[[id number]]       Abnormal            Final result                 Please view results for these tests on the individual orders.   EXTRA TUBES    Narrative:     The following orders were created for panel order EXTRA TUBES.  Procedure                               Abnormality         Status                     ---------                               -----------         ------                     Light Blue Top Hold[[id number]]                             In  process                   Please view results for these tests on the individual orders.   LIGHT BLUE TOP HOLD          Imaging Results              CT Head Without Contrast (Final result)  Result time 04/02/25 21:38:34      Final result by Mik Lackey MD (04/02/25 21:38:34)                   Impression:      1. No acute intracranial process with no detrimental change.  2. 2.4 cm arachnoid cyst or encephalomalacia in the right posterior occipital cortex/cortical margin, minimally improved from the prior studies. No detrimental change. No edema.      Electronically signed by: Mik Lackey  Date:    04/02/2025  Time:    21:38               Narrative:    EXAMINATION:  CT HEAD WITHOUT CONTRAST    CLINICAL HISTORY:  Neuro deficit, acute, stroke suspected;Dizziness, persistent/recurrent, cardiac or vascular cause suspected;    TECHNIQUE:  Low dose axial CT images obtained throughout the head without intravenous contrast. Sagittal and coronal reconstructions were performed.    COMPARISON:  11/17/2022, 11/04/2018    FINDINGS:  Intracranial compartment:    Ventricles and sulci are normal in size for age without evidence of hydrocephalus. No extra-axial blood or fluid collections.    2.4 cm arachnoid cyst or encephalomalacia in the right posterior occipital cortex/cortical margin, minimally improved from the prior studies.  No detrimental change.  No edema.    No parenchymal mass, hemorrhage, edema or major vascular distribution infarct.    Skull/extracranial contents (limited evaluation): No fracture. Mastoid air cells and paranasal sinuses are essentially clear.                                       X-Ray Thoracic Spine AP Lateral (Final result)  Result time 04/03/25 08:24:28      Final result by Brian Bridges MD (04/03/25 08:24:28)                   Impression:      Mild multilevel degenerative disc disease.      Electronically signed by: Brian Bridges  Date:    04/03/2025  Time:    08:24                Narrative:    EXAMINATION:  XR THORACIC SPINE AP LATERAL    CLINICAL HISTORY:  paresthesia;    TECHNIQUE:  AP, lateral and swimmer's views of the thoracic spine.    COMPARISON:  None    FINDINGS:  The thoracic vertebral bodies are normal in height and alignment.  No acute fracture or subluxation.    There is mild multilevel degenerative disc disease.    Visualized lungs are clear.                                       X-Ray Chest 1 View (Final result)  Result time 04/03/25 08:23:55   Procedure changed from X-Ray Chest AP Portable     Final result by Brian Bridges MD (04/03/25 08:23:55)                   Impression:      No acute chest disease.      Electronically signed by: Brian Bridges  Date:    04/03/2025  Time:    08:23               Narrative:    EXAMINATION:  XR CHEST 1 VIEW    CLINICAL HISTORY:  numbness; Chest pain, unspecified    TECHNIQUE:  Single frontal view of the chest was performed.    COMPARISON:  11/04/2018.    FINDINGS:  Lungs are clear.  No focal consolidation.  Heart size top-normal.  Mediastinal contours unremarkable.  Trachea midline.    Bony thorax intact.                                       Medications   dexAMETHasone injection 4 mg (4 mg Intramuscular Given 4/2/25 2091)   ketorolac injection 30 mg (30 mg Intramuscular Given 4/2/25 0901)     Medical Decision Making  44 year old female presents to ED with complaint of right arm/hand numbness/tingling, dizziness, headache, and chest pressure. Patient states she woke up this morning with her hand tingling and thought she may have slept wrong. She states the feeling continued throughout the day and she noted feeling going up into her arm. She states she works from home and stood up to stretch and became dizzy and had to stand a minute to gather herself to prevent falling. She reports having chest pressure and felt she may need to be evaluated to rule out serious issues. Denies fall or recent injury. Denies heavy lifting/pulling. Patient  reports having sciatica to lower legs due to L4-L5 spondylosis and states pain is somewhat the same but different. Denies cough, fever, chills, shortness of breath, nausea/vomiting. She reports other PMH of HTN, HLD; reports use of estradiol after having hysterectomy for HRT.     Labs, diagnostics ordered and reviewed  Radiologist/independent interpretation     EKG ordered and reviewed  EKG significant for no ST elevation  Rate 77  Rhythm SR; low voltage QRS  Interpreted by ED physician    IM Toradol, Decadron administered  Prescriptions provided  Discussed thoracic degenerative disc noted on independent interpretation and possibility for radiculopathy symptoms to upper extremities        Amount and/or Complexity of Data Reviewed  Labs: ordered.  Radiology: ordered.    Risk  Prescription drug management.                                      Clinical Impression:   Final diagnoses:  [R42] Dizziness  [R07.9] Chest pain  [R20.2] Paresthesias (Primary)        ED Disposition Condition    Discharge Stable          ED Prescriptions       Medication Sig Dispense Start Date End Date Auth. Provider    predniSONE (DELTASONE) 50 MG Tab Take 1 tablet (50 mg total) by mouth once daily. for 5 days 5 tablet 4/2/2025 4/7/2025 Mary Jane Albrecht FNP    ibuprofen (ADVIL,MOTRIN) 800 MG tablet Take 1 tablet (800 mg total) by mouth every 6 (six) hours as needed for Pain. 20 tablet 4/2/2025 -- Mary Jane Albrecht FNP    gabapentin (NEURONTIN) 100 MG capsule Take 1 capsule (100 mg total) by mouth 3 (three) times daily. 90 capsule 4/2/2025 5/2/2025 Mary Jane Albrecht FNP          Follow-up Information    None            [1]   Social History  Tobacco Use    Smoking status: Former     Types: Cigars     Passive exposure: Past    Smokeless tobacco: Never   Substance Use Topics    Alcohol use: Yes     Comment: 1 mixed drink ocassionally    Drug use: Never        Mary Jane Albrecht FNP  04/03/25 1835

## 2025-04-15 ENCOUNTER — OFFICE VISIT (OUTPATIENT)
Dept: FAMILY MEDICINE | Facility: CLINIC | Age: 45
End: 2025-04-15
Payer: COMMERCIAL

## 2025-04-15 VITALS
HEART RATE: 76 BPM | HEIGHT: 66 IN | SYSTOLIC BLOOD PRESSURE: 139 MMHG | TEMPERATURE: 98 F | DIASTOLIC BLOOD PRESSURE: 81 MMHG | BODY MASS INDEX: 45.45 KG/M2 | OXYGEN SATURATION: 97 % | WEIGHT: 282.81 LBS

## 2025-04-15 DIAGNOSIS — R73.03 PREDIABETES: ICD-10-CM

## 2025-04-15 DIAGNOSIS — I10 HYPERTENSION, UNSPECIFIED TYPE: ICD-10-CM

## 2025-04-15 DIAGNOSIS — G89.29 CHRONIC LEFT-SIDED LOW BACK PAIN WITH LEFT-SIDED SCIATICA: ICD-10-CM

## 2025-04-15 DIAGNOSIS — Z78.0 POST-MENOPAUSAL: ICD-10-CM

## 2025-04-15 DIAGNOSIS — M47.817 LUMBOSACRAL SPONDYLOSIS WITHOUT MYELOPATHY: Chronic | ICD-10-CM

## 2025-04-15 DIAGNOSIS — E55.9 VITAMIN D DEFICIENCY: ICD-10-CM

## 2025-04-15 DIAGNOSIS — E66.01 CLASS 3 SEVERE OBESITY DUE TO EXCESS CALORIES WITH BODY MASS INDEX (BMI) OF 45.0 TO 49.9 IN ADULT, UNSPECIFIED WHETHER SERIOUS COMORBIDITY PRESENT: ICD-10-CM

## 2025-04-15 DIAGNOSIS — Z00.00 ROUTINE GENERAL MEDICAL EXAMINATION AT A HEALTH CARE FACILITY: Primary | ICD-10-CM

## 2025-04-15 DIAGNOSIS — E78.5 HYPERLIPIDEMIA, UNSPECIFIED HYPERLIPIDEMIA TYPE: ICD-10-CM

## 2025-04-15 DIAGNOSIS — E66.813 CLASS 3 SEVERE OBESITY DUE TO EXCESS CALORIES WITH BODY MASS INDEX (BMI) OF 45.0 TO 49.9 IN ADULT, UNSPECIFIED WHETHER SERIOUS COMORBIDITY PRESENT: ICD-10-CM

## 2025-04-15 DIAGNOSIS — E53.8 VITAMIN B12 DEFICIENCY: ICD-10-CM

## 2025-04-15 DIAGNOSIS — Z90.710 HISTORY OF TOTAL HYSTERECTOMY: ICD-10-CM

## 2025-04-15 DIAGNOSIS — Z13.29 SCREENING FOR ENDOCRINE DISORDER: ICD-10-CM

## 2025-04-15 DIAGNOSIS — M10.9 GOUT, UNSPECIFIED CAUSE, UNSPECIFIED CHRONICITY, UNSPECIFIED SITE: ICD-10-CM

## 2025-04-15 DIAGNOSIS — M54.42 CHRONIC LEFT-SIDED LOW BACK PAIN WITH LEFT-SIDED SCIATICA: ICD-10-CM

## 2025-04-15 LAB
25(OH)D3 SERPL-MCNC: 28.9 NG/ML (ref 30–80)
ALBUMIN SERPL BCP-MCNC: 4 G/DL (ref 3.5–5)
ALBUMIN/GLOB SERPL: 1 {RATIO}
ALP SERPL-CCNC: 68 U/L (ref 40–150)
ALT SERPL W P-5'-P-CCNC: 12 U/L
ANION GAP SERPL CALCULATED.3IONS-SCNC: 11 MMOL/L (ref 7–16)
AST SERPL W P-5'-P-CCNC: 18 U/L (ref 11–45)
BASOPHILS # BLD AUTO: 0.04 K/UL (ref 0–0.2)
BASOPHILS NFR BLD AUTO: 0.4 % (ref 0–1)
BILIRUB SERPL-MCNC: 0.5 MG/DL
BILIRUB SERPL-MCNC: ABNORMAL MG/DL
BLOOD URINE, POC: ABNORMAL
BUN SERPL-MCNC: 14 MG/DL (ref 7–19)
BUN/CREAT SERPL: 12 (ref 6–20)
CALCIUM SERPL-MCNC: 9.6 MG/DL (ref 8.4–10.2)
CHLORIDE SERPL-SCNC: 104 MMOL/L (ref 98–107)
CHOLEST SERPL-MCNC: 145 MG/DL
CHOLEST/HDLC SERPL: 2.3 {RATIO}
CLARITY, UA: CLEAR
CO2 SERPL-SCNC: 28 MMOL/L (ref 22–29)
COLOR, UA: YELLOW
CREAT SERPL-MCNC: 1.18 MG/DL (ref 0.55–1.02)
DIFFERENTIAL METHOD BLD: ABNORMAL
EGFR (NO RACE VARIABLE) (RUSH/TITUS): 59 ML/MIN/1.73M2
EOSINOPHIL # BLD AUTO: 0.19 K/UL (ref 0–0.5)
EOSINOPHIL NFR BLD AUTO: 1.7 % (ref 1–4)
ERYTHROCYTE [DISTWIDTH] IN BLOOD BY AUTOMATED COUNT: 15.5 % (ref 11.5–14.5)
EST. AVERAGE GLUCOSE BLD GHB EST-MCNC: 131 MG/DL
FOLATE SERPL-MCNC: 5.8 NG/ML (ref 7–31.4)
GLOBULIN SER-MCNC: 3.9 G/DL (ref 2–4)
GLUCOSE SERPL-MCNC: 73 MG/DL (ref 74–100)
GLUCOSE UR QL STRIP: ABNORMAL
HBA1C MFR BLD HPLC: 6.2 %
HCT VFR BLD AUTO: 43.8 % (ref 38–47)
HDLC SERPL-MCNC: 62 MG/DL (ref 35–60)
HGB BLD-MCNC: 13.5 G/DL (ref 12–16)
IMM GRANULOCYTES # BLD AUTO: 0.04 K/UL (ref 0–0.04)
IMM GRANULOCYTES NFR BLD: 0.4 % (ref 0–0.4)
KETONES UR QL STRIP: ABNORMAL
LDLC SERPL CALC-MCNC: 58 MG/DL
LEUKOCYTE ESTERASE URINE, POC: ABNORMAL
LYMPHOCYTES # BLD AUTO: 3.67 K/UL (ref 1–4.8)
LYMPHOCYTES NFR BLD AUTO: 33.6 % (ref 27–41)
MCH RBC QN AUTO: 28 PG (ref 27–31)
MCHC RBC AUTO-ENTMCNC: 30.8 G/DL (ref 32–36)
MCV RBC AUTO: 90.7 FL (ref 80–96)
MONOCYTES # BLD AUTO: 0.52 K/UL (ref 0–0.8)
MONOCYTES NFR BLD AUTO: 4.8 % (ref 2–6)
MPC BLD CALC-MCNC: 10 FL (ref 9.4–12.4)
NEUTROPHILS # BLD AUTO: 6.46 K/UL (ref 1.8–7.7)
NEUTROPHILS NFR BLD AUTO: 59.1 % (ref 53–65)
NITRITE, POC UA: ABNORMAL
NONHDLC SERPL-MCNC: 83 MG/DL
NRBC # BLD AUTO: 0 X10E3/UL
NRBC, AUTO (.00): 0 %
PH, POC UA: 6.5
PLATELET # BLD AUTO: 377 K/UL (ref 150–400)
POTASSIUM SERPL-SCNC: 4.7 MMOL/L (ref 3.5–5.1)
PROT SERPL-MCNC: 7.9 G/DL (ref 6.4–8.3)
PROTEIN, POC: ABNORMAL
RBC # BLD AUTO: 4.83 M/UL (ref 4.2–5.4)
SODIUM SERPL-SCNC: 138 MMOL/L (ref 136–145)
SPECIFIC GRAVITY, POC UA: 1.02
T4 FREE SERPL-MCNC: 1.01 NG/DL (ref 0.7–1.48)
TRIGL SERPL-MCNC: 124 MG/DL (ref 37–140)
TSH SERPL DL<=0.005 MIU/L-ACNC: 1.49 UIU/ML (ref 0.35–4.94)
URATE SERPL-MCNC: 8.3 MG/DL (ref 2.6–6)
UROBILINOGEN, POC UA: 0.2
VIT B12 SERPL-MCNC: 204 PG/ML (ref 213–816)
VLDLC SERPL-MCNC: 25 MG/DL
WBC # BLD AUTO: 10.92 K/UL (ref 4.5–11)

## 2025-04-15 PROCEDURE — 3079F DIAST BP 80-89 MM HG: CPT | Mod: CPTII,,,

## 2025-04-15 PROCEDURE — 80061 LIPID PANEL: CPT | Mod: ,,, | Performed by: CLINICAL MEDICAL LABORATORY

## 2025-04-15 PROCEDURE — 3044F HG A1C LEVEL LT 7.0%: CPT | Mod: CPTII,,,

## 2025-04-15 PROCEDURE — 1159F MED LIST DOCD IN RCRD: CPT | Mod: CPTII,,,

## 2025-04-15 PROCEDURE — 80050 GENERAL HEALTH PANEL: CPT | Mod: ,,, | Performed by: CLINICAL MEDICAL LABORATORY

## 2025-04-15 PROCEDURE — 82607 VITAMIN B-12: CPT | Mod: ,,, | Performed by: CLINICAL MEDICAL LABORATORY

## 2025-04-15 PROCEDURE — 82306 VITAMIN D 25 HYDROXY: CPT | Mod: ,,, | Performed by: CLINICAL MEDICAL LABORATORY

## 2025-04-15 PROCEDURE — 3075F SYST BP GE 130 - 139MM HG: CPT | Mod: CPTII,,,

## 2025-04-15 PROCEDURE — 99396 PREV VISIT EST AGE 40-64: CPT | Mod: ,,,

## 2025-04-15 PROCEDURE — 3008F BODY MASS INDEX DOCD: CPT | Mod: CPTII,,,

## 2025-04-15 PROCEDURE — 1160F RVW MEDS BY RX/DR IN RCRD: CPT | Mod: CPTII,,,

## 2025-04-15 PROCEDURE — 82746 ASSAY OF FOLIC ACID SERUM: CPT | Mod: ,,, | Performed by: CLINICAL MEDICAL LABORATORY

## 2025-04-15 PROCEDURE — 84439 ASSAY OF FREE THYROXINE: CPT | Mod: ,,, | Performed by: CLINICAL MEDICAL LABORATORY

## 2025-04-15 PROCEDURE — 4010F ACE/ARB THERAPY RXD/TAKEN: CPT | Mod: CPTII,,,

## 2025-04-15 PROCEDURE — 83036 HEMOGLOBIN GLYCOSYLATED A1C: CPT | Mod: ,,, | Performed by: CLINICAL MEDICAL LABORATORY

## 2025-04-15 PROCEDURE — 84550 ASSAY OF BLOOD/URIC ACID: CPT | Mod: ,,, | Performed by: CLINICAL MEDICAL LABORATORY

## 2025-04-15 NOTE — LETTER
April 15, 2025      [facility]  [address] [address]  Phone: [phone]  Fax: [phone]       Patient: Humera Guerrero   YOB: 1980  Date of Visit: 04/15/2025    To Whom It May Concern:    SHAJI Guerrero  was at [facility] on 04/15/2025. The patient may return to work/school on 04/16/2025 with no restrictions. If you have any questions or concerns, or if I can be of further assistance, please do not hesitate to contact me.    Sincerely,    Argelia Shetty MA

## 2025-04-15 NOTE — PROGRESS NOTES
Kenny Benítez NP   [address] [place] [address]     PATIENT NAME: Humera Guerrero  : 1980  DATE: 4/15/25  MRN: [id number]      Billing Provider: Kenny Benítez NP  Level of Service:   Patient PCP Information       None on File            Reason for Visit / Chief Complaint: Establish Care, Hypertension, Hyperlipidemia, and Health Maintenance (..COVID-19 Vaccine(1 - 2024-25 season) Never done/Hemoglobin A1c (Prediabetes) due on )       Update PCP  Update Chief Complaint         History of Present Illness / Problem Focused Workflow     Humera Guerrero presents to the clinic with Establish Care, Hypertension, Hyperlipidemia, and Health Maintenance (..COVID-19 Vaccine(1 - 2024-25 season) Never done/Hemoglobin A1c (Prediabetes) due on )     Patient here today establish and for annual wellness exam. Hx of HTN  managed with Losartan and Spironolactone. BP appears controlled  today. Advised on DASH diet and daily BP checks. Hyperlipidemia managed with Atorvastatin. Advised on low cholesterol and fat diet. Advised on low carbohydrate and sugar diet for prediabetes management. Gout and stopped taking Allopurinol. Hx of total hysterectomy. Followed by pain management for chronic pain. Followed by rheumatology for positive CORAL. Labs today. Continue current medications. Follow up in 3 months and as needed.     Pap smear- hx of total hysterectomy  Mammogram- scheduled for 25  Tdap 10/25/22  A1C 4/15/25  Lipid 4/15/25  HIV 22  Hep C screening  24  Flu  24          Hypertension  Pertinent negatives include no chest pain, headaches, palpitations or shortness of breath.   Hyperlipidemia  Pertinent negatives include no chest pain or shortness of breath.       Review of Systems     Review of Systems   Constitutional: Negative.    HENT: Negative.     Eyes: Negative.    Respiratory: Negative.  Negative for cough, shortness of breath and wheezing.     Cardiovascular: Negative.  Negative for chest pain and palpitations.   Gastrointestinal:  Negative for abdominal pain, nausea and vomiting.   Endocrine: Negative.    Genitourinary: Negative.    Musculoskeletal:  Positive for arthralgias and back pain.   Integumentary:  Negative.   Allergic/Immunologic: Negative.    Neurological: Negative.  Negative for dizziness and headaches.   Psychiatric/Behavioral: Negative.          Medical / Social / Family History     Past Medical History:   Diagnosis Date    Cerebral cyst     Other hyperlipidemia     Prediabetes     Vitamin D insufficiency        Past Surgical History:   Procedure Laterality Date     SECTION      HYSTERECTOMY Bilateral     INJECTION OF ANESTHETIC AGENT AROUND MEDIAL BRANCH NERVES INNERVATING LUMBAR FACET JOINT Bilateral 2023    Procedure: Bilateral L4-5,5-S1 MBB;  Surgeon: Carri Sanchez MD;  Location: [facility];  Service: Pain Management;  Laterality: Bilateral;    INJECTION OF ANESTHETIC AGENT AROUND MEDIAL BRANCH NERVES INNERVATING LUMBAR FACET JOINT Bilateral 2023    Procedure: Bilateral L4-5, 5-S1 medial branch block #2;  Surgeon: Carri Sanchez MD;  Location: [facility];  Service: Pain Management;  Laterality: Bilateral;    OOPHORECTOMY      RADIOFREQUENCY ABLATION OF LUMBAR MEDIAL BRANCH NERVE AT SINGLE LEVEL Bilateral 2023    Procedure: Bilateral L4-5,5-S1 MB RFTC;  Surgeon: Carri Sanchez MD;  Location: [facility];  Service: Pain Management;  Laterality: Bilateral;    RADIOFREQUENCY ABLATION OF LUMBAR MEDIAL BRANCH NERVE AT SINGLE LEVEL Bilateral 2024    Procedure: Bilateral L4-5, L5-S1 MB RFTC under fluoroscopy;  Surgeon: Carri Sanchez MD;  Location: [facility];  Service: Pain Management;  Laterality: Bilateral;    TUBAL LIGATION         Social History  Ms.  reports that she quit smoking about 4 years ago. Her smoking use included cigars. She started smoking about 24  "years ago. She has been exposed to tobacco smoke. She has never used smokeless tobacco. She reports that she does not currently use alcohol. She reports that she does not use drugs.    Family History  Ms.'s family history includes Arthritis in her mother; Breast cancer in her paternal grandmother; Cancer in her maternal grandmother and paternal grandmother; Hypertension in her maternal grandmother and mother.    Medications and Allergies     Medications  Outpatient Medications Marked as Taking for the 4/15/25 encounter (Office Visit) with Kenny Benítez NP   Medication Sig Dispense Refill    atorvastatin (LIPITOR) 20 MG tablet Take 1 tablet (20 mg total) by mouth once daily. 90 tablet 3    estradioL (ESTRACE) 1 MG tablet Take 1 tablet (1 mg total) by mouth once daily. 90 tablet 3    ibuprofen (ADVIL,MOTRIN) 800 MG tablet Take 1 tablet (800 mg total) by mouth every 6 (six) hours as needed for Pain. 20 tablet 0    losartan (COZAAR) 50 MG tablet TAKE 1 TABLET BY MOUTH EVERY DAY 90 tablet 3    spironolactone (ALDACTONE) 25 MG tablet Take 1 tablet (25 mg total) by mouth once daily. 30 tablet 11       Allergies  Review of patient's allergies indicates:  No Known Allergies    Physical Examination   /81 (BP Location: Right arm, Patient Position: Sitting)   Pulse 76   Temp 97.6 °F (36.4 °C) (Oral)   Ht 5' 6.25" (1.683 m)   Wt 128.3 kg (282 lb 12.8 oz)   LMP  (LMP Unknown) Comment: hyster: 2016  SpO2 97%   BMI 45.30 kg/m²    Physical Exam  Vitals reviewed.   Constitutional:       Appearance: Normal appearance. She is obese.   HENT:      Right Ear: Tympanic membrane, ear canal and external ear normal.      Left Ear: Tympanic membrane, ear canal and external ear normal.      Nose: Rhinorrhea present. No congestion.      Mouth/Throat:      Mouth: Mucous membranes are moist.      Pharynx: Oropharynx is clear. No posterior oropharyngeal erythema.   Eyes:      Extraocular Movements: Extraocular movements intact. "      Conjunctiva/sclera: Conjunctivae normal.      Pupils: Pupils are equal, round, and reactive to light.   Cardiovascular:      Rate and Rhythm: Normal rate and regular rhythm.      Pulses: Normal pulses.      Heart sounds: Normal heart sounds.   Pulmonary:      Effort: Pulmonary effort is normal. No respiratory distress.      Breath sounds: Normal breath sounds. No wheezing.   Abdominal:      General: Bowel sounds are normal.      Palpations: Abdomen is soft.      Tenderness: There is no abdominal tenderness. There is no right CVA tenderness, left CVA tenderness or guarding.   Musculoskeletal:         General: Tenderness present. No swelling.      Cervical back: Normal range of motion and neck supple.      Lumbar back: Tenderness present. Decreased range of motion.   Lymphadenopathy:      Cervical: No cervical adenopathy.   Skin:     General: Skin is warm and dry.      Capillary Refill: Capillary refill takes less than 2 seconds.   Neurological:      General: No focal deficit present.      Mental Status: She is alert and oriented to person, place, and time.   Psychiatric:         Mood and Affect: Mood normal.         Behavior: Behavior normal.         Thought Content: Thought content normal.         Judgment: Judgment normal.        Assessment and Plan (including Health Maintenance)      Problem List  Smart Sets  Document Outside HM   :    Plan:   Advised on DASH diet and daily BP checks.    Advised on low cholesterol and fat diet  Advised on low carbohydrate and sugar diet for prediabetes management.   Continue current medications.   Follow up in 3 months and as needed.     Pap smear- hx of total hysterectomy  Mammogram- scheduled for 11/21/25  Tdap 10/25/22  A1C 4/15/25  Lipid 4/15/25  HIV 4/8/22  Hep C screening  7/30/24  Flu  11/25/24                  Health Maintenance Due   Topic Date Due    COVID-19 Vaccine (1 - 2024-25 season) Never done    Hemoglobin A1c (Prediabetes)  04/18/2025       Problem List Items  Addressed This Visit    None      Health Maintenance Topics with due status: Not Due       Topic Last Completion Date    TETANUS VACCINE 10/25/2022    Mammogram 11/15/2024    RSV Vaccine (Age 60+ and Pregnant patients) Not Due       Future Appointments   Date Time Provider Department Center   11/21/2025  9:15 AM [facility]            Signature:  Kenny Benítez NP      [address] [place] [address]    Date of encounter: 4/15/25

## 2025-04-16 NOTE — PATIENT INSTRUCTIONS
Advised on DASH diet and daily BP checks.    Advised on low cholesterol and fat diet  Advised on low carbohydrate and sugar diet for prediabetes management.   Continue current medications.   Follow up in 3 months and as needed.     Pap smear- hx of total hysterectomy  Mammogram- scheduled for 11/21/25  Tdap 10/25/22  A1C 4/15/25  Lipid 4/15/25  HIV 4/8/22  Hep C screening  7/30/24  Flu  11/25/24

## 2025-04-27 ENCOUNTER — RESULTS FOLLOW-UP (OUTPATIENT)
Dept: FAMILY MEDICINE | Facility: CLINIC | Age: 45
End: 2025-04-27

## 2025-04-27 DIAGNOSIS — R73.03 PREDIABETES: Primary | ICD-10-CM

## 2025-04-27 DIAGNOSIS — E66.813 CLASS 3 SEVERE OBESITY DUE TO EXCESS CALORIES WITH BODY MASS INDEX (BMI) OF 45.0 TO 49.9 IN ADULT, UNSPECIFIED WHETHER SERIOUS COMORBIDITY PRESENT: ICD-10-CM

## 2025-04-27 DIAGNOSIS — E53.8 FOLATE DEFICIENCY: ICD-10-CM

## 2025-04-27 DIAGNOSIS — M10.9 GOUT, UNSPECIFIED CAUSE, UNSPECIFIED CHRONICITY, UNSPECIFIED SITE: ICD-10-CM

## 2025-04-27 DIAGNOSIS — E55.9 VITAMIN D DEFICIENCY: ICD-10-CM

## 2025-04-27 DIAGNOSIS — N18.31 CHRONIC RENAL FAILURE, STAGE 3A: ICD-10-CM

## 2025-04-27 DIAGNOSIS — E66.01 CLASS 3 SEVERE OBESITY DUE TO EXCESS CALORIES WITH BODY MASS INDEX (BMI) OF 45.0 TO 49.9 IN ADULT, UNSPECIFIED WHETHER SERIOUS COMORBIDITY PRESENT: ICD-10-CM

## 2025-04-27 DIAGNOSIS — E53.8 VITAMIN B12 DEFICIENCY: ICD-10-CM

## 2025-04-27 RX ORDER — COLCHICINE 0.6 MG/1
0.6 TABLET ORAL DAILY
Qty: 90 TABLET | Refills: 1 | Status: SHIPPED | OUTPATIENT
Start: 2025-04-27

## 2025-04-27 RX ORDER — METFORMIN HYDROCHLORIDE 500 MG/1
500 TABLET, EXTENDED RELEASE ORAL
Qty: 90 TABLET | Refills: 3 | Status: SHIPPED | OUTPATIENT
Start: 2025-04-27 | End: 2026-04-27

## 2025-04-27 RX ORDER — FOLIC ACID 1 MG/1
1 TABLET ORAL DAILY
Qty: 90 TABLET | Refills: 0 | Status: SHIPPED | OUTPATIENT
Start: 2025-04-27

## 2025-04-27 RX ORDER — ERGOCALCIFEROL 1.25 MG/1
50000 CAPSULE ORAL
Qty: 4 CAPSULE | Refills: 0 | Status: SHIPPED | OUTPATIENT
Start: 2025-04-27

## 2025-06-09 ENCOUNTER — PATIENT MESSAGE (OUTPATIENT)
Dept: FAMILY MEDICINE | Facility: CLINIC | Age: 45
End: 2025-06-09
Payer: COMMERCIAL

## 2025-06-09 ENCOUNTER — TELEPHONE (OUTPATIENT)
Dept: FAMILY MEDICINE | Facility: CLINIC | Age: 45
End: 2025-06-09
Payer: COMMERCIAL

## 2025-06-09 NOTE — TELEPHONE ENCOUNTER
Copied from CRM #[id number]. Topic: General Inquiry - Patient Advice  >> Jun 9, 2025  9:27 AM Rosaura wrote:  JOAN Guerrero called in stating that she is a new patient with Kenny Benítez and she wanted to speak with her or a nurse regarding her medical insurance from her visit on 04/15. Please message her or give her a call back regarding this issue. Thank you.

## 2025-08-07 ENCOUNTER — PATIENT MESSAGE (OUTPATIENT)
Dept: FAMILY MEDICINE | Facility: CLINIC | Age: 45
End: 2025-08-07
Payer: COMMERCIAL

## 2025-08-08 RX ORDER — ATORVASTATIN CALCIUM 20 MG/1
20 TABLET, FILM COATED ORAL DAILY
Qty: 90 TABLET | Refills: 3 | Status: SHIPPED | OUTPATIENT
Start: 2025-08-08 | End: 2026-08-08

## (undated) DEVICE — CATH IV 22G X 1 AUTOGUARD

## (undated) DEVICE — KIT PROBE COOLIEF RF 17G 150MM

## (undated) DEVICE — SLIPPER FALL PREV YELLOW XLG

## (undated) DEVICE — TRAY NERVE BLOCK UNIV 10/CA

## (undated) DEVICE — GLOVE SENSICARE PI SURG 6.5

## (undated) DEVICE — KIT IV START RUSH

## (undated) DEVICE — GLOVE PROTEXIS PI SYN SURG 6.5

## (undated) DEVICE — KIT COOLED RF 100MM SGL PROBE

## (undated) DEVICE — SET EXT STD BORE CATH 7.6IN

## (undated) DEVICE — ELECTRODE REM PLYHSV RETURN 9

## (undated) DEVICE — SOL CONTINU-FLO SET 2 LAV

## (undated) DEVICE — APPLICATOR CHLORAPREP ORN 26ML

## (undated) DEVICE — CATH INTROCAN SAF 2 IV 22GX1IN

## (undated) DEVICE — NDL SPINAL CLR HUB 22GX4 3/4

## (undated) DEVICE — NDL QUINCKE S/SU 22GA 5IN

## (undated) DEVICE — DRAPE THREE-QTR REINF 53X77IN

## (undated) DEVICE — DRAPE THREE-QUARTER 53X77IN

## (undated) DEVICE — KIT IV START